# Patient Record
Sex: MALE | Race: WHITE | NOT HISPANIC OR LATINO | Employment: OTHER | URBAN - METROPOLITAN AREA
[De-identification: names, ages, dates, MRNs, and addresses within clinical notes are randomized per-mention and may not be internally consistent; named-entity substitution may affect disease eponyms.]

---

## 2019-09-09 ENCOUNTER — APPOINTMENT (EMERGENCY)
Dept: RADIOLOGY | Facility: HOSPITAL | Age: 64
DRG: 204 | End: 2019-09-09
Payer: COMMERCIAL

## 2019-09-09 ENCOUNTER — HOSPITAL ENCOUNTER (INPATIENT)
Facility: HOSPITAL | Age: 64
LOS: 1 days | Discharge: HOME | DRG: 204 | End: 2019-09-12
Attending: EMERGENCY MEDICINE | Admitting: INTERNAL MEDICINE
Payer: COMMERCIAL

## 2019-09-09 ENCOUNTER — APPOINTMENT (EMERGENCY)
Dept: CT IMAGING | Facility: HOSPITAL | Age: 64
DRG: 204 | End: 2019-09-09
Payer: COMMERCIAL

## 2019-09-09 DIAGNOSIS — R06.02 SHORTNESS OF BREATH: ICD-10-CM

## 2019-09-09 DIAGNOSIS — R07.81 RIB PAIN: ICD-10-CM

## 2019-09-09 DIAGNOSIS — R05.3 CHRONIC COUGH: ICD-10-CM

## 2019-09-09 DIAGNOSIS — R05.3 PERSISTENT COUGH FOR 3 WEEKS OR LONGER: Primary | ICD-10-CM

## 2019-09-09 DIAGNOSIS — I10 ESSENTIAL HYPERTENSION: ICD-10-CM

## 2019-09-09 PROBLEM — Z96.651 S/P TOTAL KNEE ARTHROPLASTY, RIGHT: Status: ACTIVE | Noted: 2019-04-05

## 2019-09-09 PROBLEM — M17.11 OSTEOARTHRITIS OF RIGHT KNEE: Status: ACTIVE | Noted: 2019-02-12

## 2019-09-09 LAB
ALBUMIN SERPL BCP-MCNC: 3.8 G/DL (ref 3.5–5)
ALP SERPL-CCNC: 90 U/L (ref 46–116)
ALT SERPL W P-5'-P-CCNC: 31 U/L (ref 12–78)
ANION GAP SERPL CALCULATED.3IONS-SCNC: 9 MMOL/L (ref 4–13)
AST SERPL W P-5'-P-CCNC: 25 U/L (ref 5–45)
BASOPHILS # BLD AUTO: 0.03 THOUSANDS/ΜL (ref 0–0.1)
BASOPHILS NFR BLD AUTO: 1 % (ref 0–1)
BILIRUB SERPL-MCNC: 0.9 MG/DL (ref 0.2–1)
BUN SERPL-MCNC: 19 MG/DL (ref 5–25)
CALCIUM SERPL-MCNC: 9.3 MG/DL (ref 8.3–10.1)
CHLORIDE SERPL-SCNC: 101 MMOL/L (ref 100–108)
CO2 SERPL-SCNC: 29 MMOL/L (ref 21–32)
CREAT SERPL-MCNC: 1.01 MG/DL (ref 0.6–1.3)
DEPRECATED D DIMER PPP: 1164 NG/ML (FEU)
EOSINOPHIL # BLD AUTO: 0.17 THOUSAND/ΜL (ref 0–0.61)
EOSINOPHIL NFR BLD AUTO: 3 % (ref 0–6)
ERYTHROCYTE [DISTWIDTH] IN BLOOD BY AUTOMATED COUNT: 13 % (ref 11.6–15.1)
GFR SERPL CREATININE-BSD FRML MDRD: 77 ML/MIN/1.73SQ M
GLUCOSE SERPL-MCNC: 100 MG/DL (ref 65–140)
HCT VFR BLD AUTO: 41.5 % (ref 36.5–49.3)
HGB BLD-MCNC: 13.7 G/DL (ref 12–17)
IMM GRANULOCYTES # BLD AUTO: 0.01 THOUSAND/UL (ref 0–0.2)
IMM GRANULOCYTES NFR BLD AUTO: 0 % (ref 0–2)
LYMPHOCYTES # BLD AUTO: 1.31 THOUSANDS/ΜL (ref 0.6–4.47)
LYMPHOCYTES NFR BLD AUTO: 23 % (ref 14–44)
MCH RBC QN AUTO: 31.1 PG (ref 26.8–34.3)
MCHC RBC AUTO-ENTMCNC: 33 G/DL (ref 31.4–37.4)
MCV RBC AUTO: 94 FL (ref 82–98)
MONOCYTES # BLD AUTO: 0.54 THOUSAND/ΜL (ref 0.17–1.22)
MONOCYTES NFR BLD AUTO: 9 % (ref 4–12)
NEUTROPHILS # BLD AUTO: 3.7 THOUSANDS/ΜL (ref 1.85–7.62)
NEUTS SEG NFR BLD AUTO: 64 % (ref 43–75)
NRBC BLD AUTO-RTO: 0 /100 WBCS
NT-PROBNP SERPL-MCNC: 340 PG/ML
PLATELET # BLD AUTO: 189 THOUSANDS/UL (ref 149–390)
PMV BLD AUTO: 8.5 FL (ref 8.9–12.7)
POTASSIUM SERPL-SCNC: 4.2 MMOL/L (ref 3.5–5.3)
PROT SERPL-MCNC: 7.3 G/DL (ref 6.4–8.2)
RBC # BLD AUTO: 4.41 MILLION/UL (ref 3.88–5.62)
SODIUM SERPL-SCNC: 139 MMOL/L (ref 136–145)
TROPONIN I SERPL-MCNC: <0.02 NG/ML
WBC # BLD AUTO: 5.76 THOUSAND/UL (ref 4.31–10.16)

## 2019-09-09 PROCEDURE — 84484 ASSAY OF TROPONIN QUANT: CPT | Performed by: PHYSICIAN ASSISTANT

## 2019-09-09 PROCEDURE — 71046 X-RAY EXAM CHEST 2 VIEWS: CPT

## 2019-09-09 PROCEDURE — 84443 ASSAY THYROID STIM HORMONE: CPT | Performed by: NURSE PRACTITIONER

## 2019-09-09 PROCEDURE — 85025 COMPLETE CBC W/AUTO DIFF WBC: CPT | Performed by: PHYSICIAN ASSISTANT

## 2019-09-09 PROCEDURE — 94645 CONT INHLJ TX EACH ADDL HOUR: CPT

## 2019-09-09 PROCEDURE — 94760 N-INVAS EAR/PLS OXIMETRY 1: CPT

## 2019-09-09 PROCEDURE — 94644 CONT INHLJ TX 1ST HOUR: CPT

## 2019-09-09 PROCEDURE — 71275 CT ANGIOGRAPHY CHEST: CPT

## 2019-09-09 PROCEDURE — 83880 ASSAY OF NATRIURETIC PEPTIDE: CPT | Performed by: PHYSICIAN ASSISTANT

## 2019-09-09 PROCEDURE — 94664 DEMO&/EVAL PT USE INHALER: CPT

## 2019-09-09 PROCEDURE — 96374 THER/PROPH/DIAG INJ IV PUSH: CPT

## 2019-09-09 PROCEDURE — 80053 COMPREHEN METABOLIC PANEL: CPT | Performed by: PHYSICIAN ASSISTANT

## 2019-09-09 PROCEDURE — 99220 PR INITIAL OBSERVATION CARE/DAY 70 MINUTES: CPT | Performed by: INTERNAL MEDICINE

## 2019-09-09 PROCEDURE — 85379 FIBRIN DEGRADATION QUANT: CPT | Performed by: PHYSICIAN ASSISTANT

## 2019-09-09 PROCEDURE — 99285 EMERGENCY DEPT VISIT HI MDM: CPT | Performed by: PHYSICIAN ASSISTANT

## 2019-09-09 PROCEDURE — 36415 COLL VENOUS BLD VENIPUNCTURE: CPT | Performed by: PHYSICIAN ASSISTANT

## 2019-09-09 PROCEDURE — 99291 CRITICAL CARE FIRST HOUR: CPT

## 2019-09-09 PROCEDURE — 93005 ELECTROCARDIOGRAM TRACING: CPT

## 2019-09-09 RX ORDER — AMLODIPINE BESYLATE 5 MG/1
5 TABLET ORAL DAILY
COMMUNITY
Start: 2019-08-15 | End: 2019-09-12 | Stop reason: HOSPADM

## 2019-09-09 RX ORDER — GUAIFENESIN 600 MG
600 TABLET, EXTENDED RELEASE 12 HR ORAL EVERY 12 HOURS SCHEDULED
Status: DISCONTINUED | OUTPATIENT
Start: 2019-09-09 | End: 2019-09-11

## 2019-09-09 RX ORDER — MAGNESIUM SULFATE HEPTAHYDRATE 40 MG/ML
2 INJECTION, SOLUTION INTRAVENOUS ONCE
Status: COMPLETED | OUTPATIENT
Start: 2019-09-09 | End: 2019-09-10

## 2019-09-09 RX ORDER — LIDOCAINE 50 MG/G
2 PATCH TOPICAL ONCE
Status: COMPLETED | OUTPATIENT
Start: 2019-09-09 | End: 2019-09-10

## 2019-09-09 RX ORDER — SODIUM CHLORIDE FOR INHALATION 0.9 %
12 VIAL, NEBULIZER (ML) INHALATION ONCE
Status: COMPLETED | OUTPATIENT
Start: 2019-09-09 | End: 2019-09-09

## 2019-09-09 RX ORDER — AMLODIPINE BESYLATE 5 MG/1
5 TABLET ORAL DAILY
Status: DISCONTINUED | OUTPATIENT
Start: 2019-09-10 | End: 2019-09-10

## 2019-09-09 RX ORDER — KETOROLAC TROMETHAMINE 30 MG/ML
15 INJECTION, SOLUTION INTRAMUSCULAR; INTRAVENOUS ONCE
Status: COMPLETED | OUTPATIENT
Start: 2019-09-09 | End: 2019-09-09

## 2019-09-09 RX ORDER — ACETAMINOPHEN 325 MG/1
650 TABLET ORAL EVERY 6 HOURS PRN
Status: DISCONTINUED | OUTPATIENT
Start: 2019-09-09 | End: 2019-09-12 | Stop reason: HOSPADM

## 2019-09-09 RX ORDER — METHYLPREDNISOLONE SODIUM SUCCINATE 125 MG/2ML
125 INJECTION, POWDER, LYOPHILIZED, FOR SOLUTION INTRAMUSCULAR; INTRAVENOUS ONCE
Status: COMPLETED | OUTPATIENT
Start: 2019-09-09 | End: 2019-09-09

## 2019-09-09 RX ORDER — SODIUM CHLORIDE FOR INHALATION 0.9 %
3 VIAL, NEBULIZER (ML) INHALATION ONCE
Status: COMPLETED | OUTPATIENT
Start: 2019-09-09 | End: 2019-09-09

## 2019-09-09 RX ORDER — IPRATROPIUM BROMIDE AND ALBUTEROL SULFATE 2.5; .5 MG/3ML; MG/3ML
3 SOLUTION RESPIRATORY (INHALATION) EVERY 6 HOURS PRN
Status: DISCONTINUED | OUTPATIENT
Start: 2019-09-09 | End: 2019-09-11

## 2019-09-09 RX ORDER — METHOCARBAMOL 500 MG/1
500 TABLET, FILM COATED ORAL EVERY 6 HOURS PRN
Status: DISCONTINUED | OUTPATIENT
Start: 2019-09-09 | End: 2019-09-12 | Stop reason: HOSPADM

## 2019-09-09 RX ORDER — ENALAPRIL MALEATE AND HYDROCHLOROTHIAZIDE 10; 25 MG/1; MG/1
1 TABLET ORAL DAILY
COMMUNITY
Start: 2019-08-28 | End: 2019-09-12 | Stop reason: HOSPADM

## 2019-09-09 RX ORDER — LISINOPRIL 20 MG/1
20 TABLET ORAL DAILY
Status: DISCONTINUED | OUTPATIENT
Start: 2019-09-10 | End: 2019-09-10

## 2019-09-09 RX ORDER — HYDROCHLOROTHIAZIDE 25 MG/1
25 TABLET ORAL DAILY
Status: DISCONTINUED | OUTPATIENT
Start: 2019-09-10 | End: 2019-09-12 | Stop reason: HOSPADM

## 2019-09-09 RX ORDER — IPRATROPIUM BROMIDE AND ALBUTEROL SULFATE 2.5; .5 MG/3ML; MG/3ML
3 SOLUTION RESPIRATORY (INHALATION)
Status: DISCONTINUED | OUTPATIENT
Start: 2019-09-10 | End: 2019-09-09

## 2019-09-09 RX ORDER — FUROSEMIDE 10 MG/ML
20 INJECTION INTRAMUSCULAR; INTRAVENOUS ONCE
Status: COMPLETED | OUTPATIENT
Start: 2019-09-09 | End: 2019-09-09

## 2019-09-09 RX ADMIN — ALBUTEROL SULFATE 10 MG: 2.5 SOLUTION RESPIRATORY (INHALATION) at 18:25

## 2019-09-09 RX ADMIN — ISODIUM CHLORIDE 12 ML: 0.03 SOLUTION RESPIRATORY (INHALATION) at 18:25

## 2019-09-09 RX ADMIN — KETOROLAC TROMETHAMINE 15 MG: 30 INJECTION, SOLUTION INTRAMUSCULAR at 18:10

## 2019-09-09 RX ADMIN — IPRATROPIUM BROMIDE 1 MG: 0.5 SOLUTION RESPIRATORY (INHALATION) at 18:25

## 2019-09-09 RX ADMIN — IPRATROPIUM BROMIDE 0.5 MG: 0.5 SOLUTION RESPIRATORY (INHALATION) at 16:03

## 2019-09-09 RX ADMIN — ACETAMINOPHEN 650 MG: 325 TABLET ORAL at 20:38

## 2019-09-09 RX ADMIN — FUROSEMIDE 20 MG: 10 INJECTION, SOLUTION INTRAMUSCULAR; INTRAVENOUS at 20:38

## 2019-09-09 RX ADMIN — METHYLPREDNISOLONE SODIUM SUCCINATE 125 MG: 125 INJECTION, POWDER, FOR SOLUTION INTRAMUSCULAR; INTRAVENOUS at 20:38

## 2019-09-09 RX ADMIN — LIDOCAINE 2 PATCH: 50 PATCH TOPICAL at 15:55

## 2019-09-09 RX ADMIN — IOHEXOL 100 ML: 350 INJECTION, SOLUTION INTRAVENOUS at 17:14

## 2019-09-09 RX ADMIN — ALBUTEROL SULFATE 10 MG: 2.5 SOLUTION RESPIRATORY (INHALATION) at 16:03

## 2019-09-09 RX ADMIN — MAGNESIUM SULFATE HEPTAHYDRATE 2 G: 40 INJECTION, SOLUTION INTRAVENOUS at 20:38

## 2019-09-09 RX ADMIN — ISODIUM CHLORIDE 3 ML: 0.03 SOLUTION RESPIRATORY (INHALATION) at 16:03

## 2019-09-09 RX ADMIN — METHOCARBAMOL TABLETS 500 MG: 500 TABLET, COATED ORAL at 20:46

## 2019-09-09 RX ADMIN — GUAIFENESIN 600 MG: 600 TABLET, EXTENDED RELEASE ORAL at 20:38

## 2019-09-09 NOTE — ED PROVIDER NOTES
History  Chief Complaint   Patient presents with    Cough     Pt reporst SOB and dry Cough for the last 1 month, pt reports seeing PCP for the 3rd time regarding these symptoms and was sent here  Pt reports being on antibitoics and chest XR with no findings  Pt reports having Right knee replacement april 1, and difficulty doing PT at this time due to SOB, pt reports also have diarrhea    Rib Pain     Pt reports left sided rib pain associated with coughing, reports "My lungs feel like their bruised:      Patient is a 76-year-old male presents today with a chief complaint of shortness of breath and cough the past 7 weeks  Patient reports he has rib pain associated with coughing and notes the cough is dry in nature  Patient denies an asthma history and denies COPD  Patient reports he has not had any fevers or chills associated with his cough  Patient reports he has taken multiple medications to attempt to alleviate the symptoms of his cough including steroids, albuterol, over-the-counter cough medications none of which have been helping alleviate his symptoms  History provided by:  Patient  Cough   Cough characteristics:  Non-productive  Severity:  Severe  Onset quality:  Gradual  Duration:  7 weeks  Timing:  Constant  Progression:  Worsening  Chronicity:  New  Smoker: no    Relieved by:  Nothing  Worsened by:  Deep breathing  Ineffective treatments:  Beta-agonist inhaler, decongestant, cough suppressants, rest and fluids  Associated symptoms: shortness of breath, sore throat and wheezing    Associated symptoms: no chest pain, no chills, no ear pain, no fever, no rash and no rhinorrhea        Prior to Admission Medications   Prescriptions Last Dose Informant Patient Reported? Taking?    amLODIPine (NORVASC) 5 mg tablet 9/9/2019 at Unknown time  Yes Yes   Sig: Take 5 mg by mouth daily   enalapril-hydrochlorothiazide (VASERETIC) 10-25 MG per tablet 9/9/2019 at Unknown time  Yes Yes   Sig: Take 1 tablet by mouth daily   prednisoLONE acetate (PRED FORTE) 1 % ophthalmic suspension Not Taking at Unknown time  No No   Sig: Administer 1 drop to both eyes 4 (four) times a day  Patient not taking: Reported on 9/9/2019      Facility-Administered Medications: None       Past Medical History:   Diagnosis Date    Hypertension        Past Surgical History:   Procedure Laterality Date    HERNIA REPAIR      ROTATOR CUFF REPAIR         History reviewed  No pertinent family history  I have reviewed and agree with the history as documented  Social History     Tobacco Use    Smoking status: Never Smoker   Substance Use Topics    Alcohol use: Yes     Comment: occasional     Drug use: No        Review of Systems   Constitutional: Negative for chills, fatigue and fever  HENT: Positive for sore throat  Negative for congestion, ear pain and rhinorrhea  Eyes: Negative for redness  Respiratory: Positive for cough, shortness of breath and wheezing  Negative for apnea, chest tightness and stridor  Cardiovascular: Negative for chest pain and palpitations  Gastrointestinal: Negative for abdominal pain, nausea and vomiting  Genitourinary: Negative for dysuria and hematuria  Musculoskeletal: Negative  Skin: Negative for rash  Neurological: Negative for dizziness, syncope, light-headedness and numbness  Physical Exam  Physical Exam   Constitutional: He is oriented to person, place, and time  He appears well-developed and well-nourished  HENT:   Head: Normocephalic and atraumatic  Eyes: Pupils are equal, round, and reactive to light  Neck: Normal range of motion  Cardiovascular: Normal rate, regular rhythm and normal heart sounds  Pulmonary/Chest: Tachypnea noted  He is in respiratory distress  He has decreased breath sounds  He has wheezes  He exhibits tenderness  Patient with 2-3 word dyspnea and coughing in between speaking with provider  Patient with wheezing dry cough no sputum production noted  Bilateral quiet lung sounds with wheezes noted to the bases during coughing and exhalation  Abdominal: Soft  There is no tenderness  There is no guarding  Lymphadenopathy:     He has no cervical adenopathy  Neurological: He is alert and oriented to person, place, and time  Skin: Skin is warm and dry  Capillary refill takes less than 2 seconds  Psychiatric: He has a normal mood and affect  Nursing note and vitals reviewed        Vital Signs  ED Triage Vitals [09/09/19 1511]   Temperature Pulse Respirations Blood Pressure SpO2   98 5 °F (36 9 °C) 78 22 149/82 99 %      Temp Source Heart Rate Source Patient Position - Orthostatic VS BP Location FiO2 (%)   Oral Monitor Sitting Left arm --      Pain Score       3           Vitals:    09/10/19 1500 09/10/19 2200 09/11/19 0733 09/11/19 0955   BP: 146/72 138/76 139/73    Pulse: 92 85 74 70   Patient Position - Orthostatic VS: Lying Lying Lying          Visual Acuity      ED Medications  Medications   guaiFENesin (MUCINEX) 12 hr tablet 600 mg (600 mg Oral Given 9/11/19 0821)   enoxaparin (LOVENOX) subcutaneous injection 40 mg (40 mg Subcutaneous Given 9/11/19 0821)   acetaminophen (TYLENOL) tablet 650 mg (650 mg Oral Given 9/10/19 2041)   methocarbamol (ROBAXIN) tablet 500 mg (500 mg Oral Given 9/11/19 0821)   ipratropium-albuterol (DUO-NEB) 0 5-2 5 mg/3 mL inhalation solution 3 mL (3 mL Nebulization Given 9/11/19 0931)   hydrochlorothiazide (HYDRODIURIL) tablet 25 mg (25 mg Oral Given 9/11/19 0821)   amLODIPine (NORVASC) tablet 10 mg (10 mg Oral Given 9/11/19 0821)   benzonatate (TESSALON PERLES) capsule 200 mg (200 mg Oral Given 9/11/19 0821)   dextromethorphan-guaiFENesin (ROBITUSSIN DM)  mg/5 mL oral syrup 10 mL (10 mL Oral Given 9/11/19 0821)   cefTRIAXone (ROCEPHIN) 2,000 mg in dextrose 5 % 50 mL IVPB (2,000 mg Intravenous New Bag 9/10/19 1816)   loratadine (CLARITIN) tablet 10 mg (has no administration in time range)   methylPREDNISolone sodium succinate (Solu-MEDROL) injection 20 mg (has no administration in time range)   levalbuterol (XOPENEX) inhalation solution 1 25 mg (has no administration in time range)   ipratropium (ATROVENT) 0 02 % inhalation solution 0 5 mg (has no administration in time range)   albuterol inhalation solution 10 mg (10 mg Nebulization Given 9/9/19 1603)     And   ipratropium (ATROVENT) 0 02 % inhalation solution 0 5 mg (0 5 mg Nebulization Given 9/9/19 1603)     And   sodium chloride 0 9 % inhalation solution 3 mL (3 mL Nebulization Given 9/9/19 1603)   lidocaine (LIDODERM) 5 % patch 2 patch (2 patches Topical Patch Removed 9/10/19 0438)   iohexol (OMNIPAQUE) 350 MG/ML injection (MULTI-DOSE) 100 mL (100 mL Intravenous Given 9/9/19 1714)   albuterol inhalation solution 10 mg (10 mg Nebulization Given 9/9/19 1825)   ipratropium (ATROVENT) 0 02 % inhalation solution 1 mg (1 mg Nebulization Given 9/9/19 1825)   sodium chloride 0 9 % inhalation solution 12 mL (12 mL Nebulization Given 9/9/19 1825)   ketorolac (TORADOL) injection 15 mg (15 mg Intravenous Given 9/9/19 1810)   methylPREDNISolone sodium succinate (Solu-MEDROL) injection 125 mg (125 mg Intravenous Given 9/9/19 2038)   furosemide (LASIX) injection 20 mg (20 mg Intravenous Given 9/9/19 2038)   magnesium sulfate 2 g/50 mL IVPB (premix) 2 g (0 g Intravenous Stopped 9/10/19 0723)   regadenoson (LEXISCAN) injection 0 4 mg (0 4 mg Intravenous Given 9/11/19 1154)       Diagnostic Studies  Results Reviewed     Procedure Component Value Units Date/Time    TSH, 3rd generation [[id number]]  (Normal) Collected:  09/09/19 1544    Lab Status:  Final result Specimen:  Blood from Arm, Right Updated:  09/10/19 1152     TSH 3RD GENERATON 3 477 uIU/mL     Narrative:       Patients undergoing fluorescein dye angiography may retain small amounts of fluorescein in the body for 48-72 hours post procedure  Samples containing fluorescein can produce falsely depressed TSH values   If the patient had this procedure,a specimen should be resubmitted post fluorescein clearance        D-Dimer [[id number]]  (Abnormal) Collected:  09/09/19 1544    Lab Status:  Final result Specimen:  Blood from Arm, Right Updated:  09/09/19 1628     D-Dimer, Quant 1,164 ng/ml (FEU)     B-type natriuretic peptide [[id number]]  (Abnormal) Collected:  09/09/19 1544    Lab Status:  Final result Specimen:  Blood from Arm, Right Updated:  09/09/19 1624     NT-proBNP 340 pg/mL     Comprehensive metabolic panel [[id number]] Collected:  09/09/19 1544    Lab Status:  Final result Specimen:  Blood from Arm, Right Updated:  09/09/19 1615     Sodium 139 mmol/L      Potassium 4 2 mmol/L      Chloride 101 mmol/L      CO2 29 mmol/L      ANION GAP 9 mmol/L      BUN 19 mg/dL      Creatinine 1 01 mg/dL      Glucose 100 mg/dL      Calcium 9 3 mg/dL      AST 25 U/L      ALT 31 U/L      Alkaline Phosphatase 90 U/L      Total Protein 7 3 g/dL      Albumin 3 8 g/dL      Total Bilirubin 0 90 mg/dL      eGFR 77 ml/min/1 73sq m     Narrative:       [facility] guidelines for Chronic Kidney Disease (CKD):     Stage 1 with normal or high GFR (GFR > 90 mL/min/1 73 square meters)    Stage 2 Mild CKD (GFR = 60-89 mL/min/1 73 square meters)    Stage 3A Moderate CKD (GFR = 45-59 mL/min/1 73 square meters)    Stage 3B Moderate CKD (GFR = 30-44 mL/min/1 73 square meters)    Stage 4 Severe CKD (GFR = 15-29 mL/min/1 73 square meters)    Stage 5 End Stage CKD (GFR <15 mL/min/1 73 square meters)  Note: GFR calculation is accurate only with a steady state creatinine    Troponin I [[id number]]  (Normal) Collected:  09/09/19 1544    Lab Status:  Final result Specimen:  Blood from Arm, Right Updated:  09/09/19 1611     Troponin I <0 02 ng/mL     CBC and differential [[id number]]  (Abnormal) Collected:  09/09/19 1544    Lab Status:  Final result Specimen:  Blood from Arm, Right Updated:  09/09/19 1553     WBC 5 76 Thousand/uL      RBC 4 41 Million/uL Hemoglobin 13 7 g/dL      Hematocrit 41 5 %      MCV 94 fL      MCH 31 1 pg      MCHC 33 0 g/dL      RDW 13 0 %      MPV 8 5 fL      Platelets 635 Thousands/uL      nRBC 0 /100 WBCs      Neutrophils Relative 64 %      Immat GRANS % 0 %      Lymphocytes Relative 23 %      Monocytes Relative 9 %      Eosinophils Relative 3 %      Basophils Relative 1 %      Neutrophils Absolute 3 70 Thousands/µL      Immature Grans Absolute 0 01 Thousand/uL      Lymphocytes Absolute 1 31 Thousands/µL      Monocytes Absolute 0 54 Thousand/µL      Eosinophils Absolute 0 17 Thousand/µL      Basophils Absolute 0 03 Thousands/µL                  XR chest pa & lateral   Final Result by Evan 6, DO (09/10 5677)      No acute cardiopulmonary disease  Workstation performed: [id number]         CTA ED chest PE Study   Final Result by Radha Campo MD (09/09 7847)      Mildly limited CTA of the chest demonstrates no gross evidence of an intraluminal filling defect to suggest a pulmonary embolus  No acute infiltrate or pleural effusion  Mild aneurysmal dilatation of the ascending thoracic aorta measuring 4 2 cm in diameter  Workstation performed: [id number]                    Procedures  ECG 12 Lead Documentation Only  Date/Time: 9/9/2019 3:59 PM  Performed by: Adelaide Hernandez PA-C  Authorized by: Adelaide Hernandez PA-C     Indications / Diagnosis:  Shortness of breath  ECG reviewed by me, the ED Provider: yes    Patient location:  ED  Previous ECG:     Previous ECG:  Unavailable    Comparison to cardiac monitor: No    Interpretation:     Interpretation: normal    Rate:     ECG rate:  70    ECG rate assessment: normal    Rhythm:     Rhythm: sinus rhythm    Ectopy:     Ectopy: none    QRS:     QRS axis:  Left    QRS intervals:   Wide (156)  Conduction:     Conduction: abnormal      Abnormal conduction: complete RBBB    ST segments:     ST segments:  Normal  T waves:     T waves: normal    Comments: qtc 457           ED Course  ED Course as of Sep 11 1306   Mon Sep 09, 2019   1629 D-DIMER QUANTITATIVE(!): 1,164   1807 Patient informed of CT scan results, patient requesting more albuterol noted to be dyspneic 2-3 words at most between coughing episodes  Will contact slim for admission  Patient was agreeable to admission                            Leopoldo' Criteria for PE      Most Recent Value   Wells' Criteria for PE   Clinical signs and symptoms of DVT  0 Filed at: 09/09/2019 1630   PE is primary diagnosis or equally likely  3 Filed at: 09/09/2019 1630   HR >100  0 Filed at: 09/09/2019 1630   Immobilization at least 3 days or Surgery in the previous 4 weeks  0 Filed at: 09/09/2019 1630   Previous, objectively diagnosed PE or DVT  0 Filed at: 09/09/2019 1630   Hemoptysis  0 Filed at: 09/09/2019 1630   Malignancy with treatment within 6 months or palliative  0 Filed at: 09/09/2019 1630   Wells' Criteria Total  3 Filed at: 09/09/2019 1630            MDM    Disposition  Final diagnoses:   Persistent cough for 3 weeks or longer   Rib pain     Time reflects when diagnosis was documented in both MDM as applicable and the Disposition within this note     Time User Action Codes Description Comment    9/9/2019  6:15 PM Naima Cruz Add [R05] Persistent cough for 3 weeks or longer     9/9/2019  6:15 PM Aurora Avendaño Jeniffer [R07 81] Rib pain     9/10/2019  9:24 AM Ish HURTADO Add [R06 02] Shortness of breath     9/10/2019  9:24 AM Ish HURTADO Remove [R06 02] Shortness of breath     9/10/2019  9:24 AM Lolita Chung Add [R06 02] Shortness of breath       ED Disposition     ED Disposition Condition Date/Time Comment    Admit Stable Mon Sep 9, 2019  6:15 PM Case was discussed with Dr Serrano and the patient's admission status was agreed to be Admission Status: observation status to the service of Dr Tiffany Dooley           Follow-up Information     Follow up With Specialties Details Why Contact Info    Lb Mendez PA-C Pulmonary Disease, Pulmonology, Physician Assistant, Critical Care Medicine Follow up on 9/17/2019 Your appointment is at 10:30 am  Please arrive 15 minutes early to complete the check-in process  [address]            Current Discharge Medication List      CONTINUE these medications which have NOT CHANGED    Details   amLODIPine (NORVASC) 5 mg tablet Take 5 mg by mouth daily      enalapril-hydrochlorothiazide (VASERETIC) 10-25 MG per tablet Take 1 tablet by mouth daily      prednisoLONE acetate (PRED FORTE) 1 % ophthalmic suspension Administer 1 drop to both eyes 4 (four) times a day  Qty: 5 mL, Refills: 0           No discharge procedures on file      ED Provider  Electronically Signed by           Isra Young PA-C  09/11/19 1135

## 2019-09-09 NOTE — ED NOTES
Respiratory called for 10 mg albuterol radhika Casillas, [address] [facility]  09/09/19 2688

## 2019-09-09 NOTE — ED NOTES
Patient states he feels "much better" with the albuterol 10 mgbreathing treatment        Alexsandra Casillas RN  09/09/19 9862

## 2019-09-09 NOTE — ASSESSMENT & PLAN NOTE
· Unclear etiology  Consider URI/bronchitis vs cardiac  Patient was apparently diagnosed with bronchitis on 8/16/19  He had follow-up with his PCP on 8/21/19 for Bronchitis, though was not getting better on z-pack, albuterol inhaler, and course of steroids  No prior hx of underlying lung disease  No smoking hx  Patient does not have history of CHF though concerning for possible cardiac component given chronicity and poor response to above interventions since beginning of August   He does not appear infected and I do not appreciate significant wheezes on exam   Possible +crackles and 1+ edema bilat  CXR is relatively unremarkable though may show mild vascular congestion on my read  Do not appreciate any infiltrate suggestive of pneumonia and he has been afebrile with leukocytosis since these symptoms began over a month ago  Still awaiting official radiology read  D-dimer elevated to 1,164 though CTA negative so do not suspect acute PE at this time  Pro-BNP mildly elevated at 304  · He received STAPLETON neb x2 with multiple duonebs down stairs with minimal improvement  He has significant conversational dyspnea  · Patient is not hypoxic, no indication for supplemental oxygen at this time  Continue to monitor  · Will give x1 dose 20mg IV lasix to assess response  Patient is lasix naive  · Will continue duonebs q6 hours  Respiratory protocol  Incentive spirometry  · x1 dose solumedrol and Mg now  · Check 2D echocardiogram to evaluate LV/diastolic dysfunction  · Monitor clinically    Monitor fever curve and WBC ct

## 2019-09-09 NOTE — H&P
H&P- Marley Grow 1955, 75 y o  male MRN: [id number]    Unit/Bed#: -Jerry Encounter: [id number]    Primary Care Provider: Zeb Chicas DO   Date and time admitted to hospital: 9/9/2019  3:29 PM    * Shortness of breath  Assessment & Plan  · Unclear etiology  Consider URI/bronchitis vs cardiac  Patient was apparently diagnosed with bronchitis on 8/16/19  He had follow-up with his PCP on 8/21/19 for Bronchitis, though was not getting better on z-pack, albuterol inhaler, and course of steroids  No prior hx of underlying lung disease  No smoking hx  Patient does not have history of CHF though concerning for possible cardiac component given chronicity and poor response to above interventions since beginning of August   He does not appear infected and I do not appreciate significant wheezes on exam   Possible +crackles and 1+ edema bilat  CXR is relatively unremarkable though may show mild vascular congestion on my read  Do not appreciate any infiltrate suggestive of pneumonia and he has been afebrile with leukocytosis since these symptoms began over a month ago  Still awaiting official radiology read  D-dimer elevated to 1,164 though CTA negative so do not suspect acute PE at this time  Pro-BNP mildly elevated at 304  · He received STAPLETON neb x2 with multiple duonebs down stairs with minimal improvement  He has significant conversational dyspnea  · Patient is not hypoxic, no indication for supplemental oxygen at this time  Continue to monitor  · Will give x1 dose 20mg IV lasix to assess response  Patient is lasix naive  · Will continue duonebs q6 hours  Respiratory protocol  Incentive spirometry  · x1 dose solumedrol and Mg now  · Check 2D echocardiogram to evaluate LV/diastolic dysfunction  · Monitor clinically    Monitor fever curve and WBC ct    Essential hypertension  Assessment & Plan  · Restart home meds    VTE Prophylaxis: Enoxaparin (Lovenox)  / sequential compression device   Code Status: Level 1 Full code    Anticipated Length of Stay:  Patient will be admitted on an Observation basis with an anticipated length of stay of  < 2 midnights  Justification for Hospital Stay: IV steroids, IV lasix, echo, scheduled breathing treatments    Total Time for Visit, including Counseling / Coordination of Care: 45 minutes  Greater than 50% of this total time spent on direct patient counseling and coordination of care  Chief Complaint:   Shortness of breath    History of Present Illness:    Aylin Rosas is a 75 y o  male with a past medical history significant for HTN who presents with chief complaint of shortness of breath and worsening dry cough for the past month  Majority of the history was obtained from chart review as patient having significant dyspnea with talking  Per chart review, patient has been having shortness of breath and dry cough since early August   He saw seen in urgent care and saw his PCP at [address] [place] [address] 3 times since then  He was initially diagnosed with bronchitis and was started on azithromycin, course of prednisone and albuterol inhaler  If no response he was recently seen back in his PCP office on 08/21 due to non improving symptoms  Returns today with these complaints as they have not gotten better  Denies any fevers or chills  He does state he has a dry cough  Denies any known underlying lung disease  Denies any underlying cardiac disease  He denies any prior history of nicotine abuse  Denies any recent sick contacts  Denies any prior occurrences  Has been afebrile without leukocytosis  Denies any fevers or chills  Denies any orthopnea though does state his legs swell mildly from time to time    Review of Systems:    Review of Systems   Constitutional: Negative for chills and fatigue  HENT: Negative for congestion and rhinorrhea  Eyes: Negative for visual disturbance  Respiratory: Positive for cough and shortness of breath   Negative for apnea, choking, chest tightness and wheezing  Cardiovascular: Positive for leg swelling  Negative for chest pain  Gastrointestinal: Negative for abdominal distention, blood in stool, constipation, diarrhea, nausea and vomiting  Musculoskeletal: Negative for arthralgias  Skin: Negative for color change, pallor and rash  Neurological: Negative for dizziness, tremors, seizures, facial asymmetry, weakness, light-headedness and headaches  All other systems reviewed and are negative  Past Medical and Surgical History:     Past Medical History:   Diagnosis Date    Hypertension        Past Surgical History:   Procedure Laterality Date    HERNIA REPAIR      ROTATOR CUFF REPAIR         Meds/Allergies:    Prior to Admission medications    Medication Sig Start Date End Date Taking? Authorizing Provider   amLODIPine (NORVASC) 5 mg tablet Take 5 mg by mouth daily 8/15/19 8/14/20 Yes Historical Provider, MD   enalapril-hydrochlorothiazide (VASERETIC) 10-25 MG per tablet Take 1 tablet by mouth daily 8/28/19 8/27/20 Yes Historical Provider, MD   prednisoLONE acetate (PRED FORTE) 1 % ophthalmic suspension Administer 1 drop to both eyes 4 (four) times a day  Patient not taking: Reported on 9/9/2019 8/4/16   Ana Sweeney MD     I have reviewed home medications with patient personally  Allergies: No Known Allergies    Social History:     Marital Status: Single     Substance Use History:   Social History     Substance and Sexual Activity   Alcohol Use Yes    Comment: occasional      Social History     Tobacco Use   Smoking Status Never Smoker     Social History     Substance and Sexual Activity   Drug Use No       Family History:    History reviewed  No pertinent family history      Physical Exam:     Vitals:   Blood Pressure: 145/65 (09/09/19 1946)  Pulse: 84 (09/09/19 1946)  Temperature: 98 7 °F (37 1 °C) (09/09/19 1946)  Temp Source: Oral (09/09/19 1946)  Respirations: 20 (09/09/19 1946)  Height: 5' 9" (175 3 cm) (09/09/19 1946)  Weight - Scale: 115 kg (252 lb 10 4 oz) (09/09/19 1946)  SpO2: 99 % (09/09/19 1946)    Physical Exam   Constitutional: He is oriented to person, place, and time  He appears well-developed and well-nourished  He appears distressed  HENT:   Head: Normocephalic and atraumatic  Eyes: Pupils are equal, round, and reactive to light  Neck: Normal range of motion  No JVD present  Cardiovascular: Normal rate and regular rhythm  Exam reveals no friction rub  No murmur heard  Pulmonary/Chest: No stridor  He is in respiratory distress  He has wheezes  He has no rales  Tachypneic  Mild expiratory wheezing with crackles at bases  Significant conversation dyspnea with dry cough   Abdominal: Soft  Bowel sounds are normal  He exhibits no distension  There is no tenderness  Musculoskeletal: He exhibits edema (1+ bilateral LE edema)  Neurological: He is alert and oriented to person, place, and time  Skin: Skin is warm  He is diaphoretic  No erythema  Nursing note and vitals reviewed  Additional Data:     Lab Results: I have personally reviewed pertinent reports  Results from last 7 days   Lab Units 09/09/19  1544   WBC Thousand/uL 5 76   HEMOGLOBIN g/dL 13 7   HEMATOCRIT % 41 5   PLATELETS Thousands/uL 189   NEUTROS PCT % 64   LYMPHS PCT % 23   MONOS PCT % 9   EOS PCT % 3     Results from last 7 days   Lab Units 09/09/19  1544   POTASSIUM mmol/L 4 2   CHLORIDE mmol/L 101   CO2 mmol/L 29   BUN mg/dL 19   CREATININE mg/dL 1 01   CALCIUM mg/dL 9 3   ALK PHOS U/L 90   ALT U/L 31   AST U/L 25           Imaging: I have personally reviewed pertinent reports  Cta Ed Chest Pe Study    Result Date: 9/9/2019  Narrative: CTA - CHEST WITH IV CONTRAST - PULMONARY ANGIOGRAM INDICATION:   Dyspnea chronic, prior xray Shortness of breath  COMPARISON: None   TECHNIQUE: CTA examination of the chest was performed using angiographic technique according to a protocol specifically tailored to evaluate for pulmonary embolism  Axial, sagittal, and coronal 2D reformatted images were created from the source data and  submitted for interpretation  In addition, coronal 3D MIP postprocessing was performed on the acquisition scanner  Radiation dose length product (DLP) for this visit:  913 mGy-cm   This examination, like all CT scans performed in the [facility], was performed utilizing techniques to minimize radiation dose exposure, including the use of iterative reconstruction and automated exposure control  IV Contrast:  100 mL of iohexol (OMNIPAQUE)  FINDINGS: PULMONARY ARTERIAL TREE:  The study was limited by the timing of the contrast bolus and patient's body habitus  Although there is no gross evidence of an intraluminal filling defect within a proximal pulmonary artery branch, evaluation for distal subsegmental emboli is limited  LUNGS:  There is mild bibasilar atelectasis with no focal infiltrate or pleural effusion  No pneumothorax  There is no tracheal or endobronchial lesion  PLEURA:  Unremarkable  HEART/GREAT VESSELS:  The heart is not enlarged and is no pericardial effusion  There is mild aneurysmal dilatation of the ascending thoracic aorta measuring 4 2 cm in diameter  MEDIASTINUM AND ARI:  Unremarkable  CHEST WALL AND LOWER NECK:   Unremarkable  VISUALIZED STRUCTURES IN THE UPPER ABDOMEN:  There is a small sliding hiatal hernia  OSSEOUS STRUCTURES:  No acute fracture or destructive osseous lesion  Impression: Mildly limited CTA of the chest demonstrates no gross evidence of an intraluminal filling defect to suggest a pulmonary embolus  No acute infiltrate or pleural effusion  Mild aneurysmal dilatation of the ascending thoracic aorta measuring 4 2 cm in diameter  Workstation performed: [id number]     Allscripts / Epic Records Reviewed: Yes     ** Please Note: This note has been constructed using a voice recognition system   **

## 2019-09-10 ENCOUNTER — APPOINTMENT (OUTPATIENT)
Dept: NON INVASIVE DIAGNOSTICS | Facility: HOSPITAL | Age: 64
DRG: 204 | End: 2019-09-10
Payer: COMMERCIAL

## 2019-09-10 PROBLEM — J20.9 ACUTE TRACHEOBRONCHITIS: Status: ACTIVE | Noted: 2019-09-10

## 2019-09-10 PROBLEM — R05.3 CHRONIC COUGH: Status: ACTIVE | Noted: 2019-09-10

## 2019-09-10 LAB
ATRIAL RATE: 70 BPM
BACTERIA SPT RESP CULT: ABNORMAL
GRAM STN SPEC: ABNORMAL
P AXIS: 60 DEGREES
PLATELET # BLD AUTO: 205 THOUSANDS/UL (ref 149–390)
PMV BLD AUTO: 8.7 FL (ref 8.9–12.7)
PR INTERVAL: 118 MS
QRS AXIS: -55 DEGREES
QRSD INTERVAL: 156 MS
QT INTERVAL: 424 MS
QTC INTERVAL: 457 MS
T WAVE AXIS: 69 DEGREES
TROPONIN I SERPL-MCNC: <0.02 NG/ML
TSH SERPL DL<=0.05 MIU/L-ACNC: 3.48 UIU/ML (ref 0.36–3.74)
VENTRICULAR RATE: 70 BPM

## 2019-09-10 PROCEDURE — 87205 SMEAR GRAM STAIN: CPT | Performed by: NURSE PRACTITIONER

## 2019-09-10 PROCEDURE — NC001 PR NO CHARGE: Performed by: NURSE PRACTITIONER

## 2019-09-10 PROCEDURE — 94760 N-INVAS EAR/PLS OXIMETRY 1: CPT

## 2019-09-10 PROCEDURE — 99225 PR SBSQ OBSERVATION CARE/DAY 25 MINUTES: CPT | Performed by: INTERNAL MEDICINE

## 2019-09-10 PROCEDURE — 85049 AUTOMATED PLATELET COUNT: CPT | Performed by: INTERNAL MEDICINE

## 2019-09-10 PROCEDURE — 94640 AIRWAY INHALATION TREATMENT: CPT

## 2019-09-10 PROCEDURE — 87521 HEPATITIS C PROBE&RVRS TRNSC: CPT | Performed by: PHYSICIAN ASSISTANT

## 2019-09-10 PROCEDURE — 93010 ELECTROCARDIOGRAM REPORT: CPT | Performed by: INTERNAL MEDICINE

## 2019-09-10 PROCEDURE — 99244 OFF/OP CNSLTJ NEW/EST MOD 40: CPT | Performed by: INTERNAL MEDICINE

## 2019-09-10 PROCEDURE — 93306 TTE W/DOPPLER COMPLETE: CPT | Performed by: INTERNAL MEDICINE

## 2019-09-10 PROCEDURE — 84484 ASSAY OF TROPONIN QUANT: CPT | Performed by: NURSE PRACTITIONER

## 2019-09-10 PROCEDURE — 93306 TTE W/DOPPLER COMPLETE: CPT

## 2019-09-10 PROCEDURE — 94150 VITAL CAPACITY TEST: CPT

## 2019-09-10 RX ORDER — GUAIFENESIN/DEXTROMETHORPHAN 100-10MG/5
10 SYRUP ORAL EVERY 4 HOURS PRN
Status: DISCONTINUED | OUTPATIENT
Start: 2019-09-10 | End: 2019-09-12 | Stop reason: HOSPADM

## 2019-09-10 RX ORDER — METHYLPREDNISOLONE SODIUM SUCCINATE 40 MG/ML
40 INJECTION, POWDER, LYOPHILIZED, FOR SOLUTION INTRAMUSCULAR; INTRAVENOUS EVERY 12 HOURS SCHEDULED
Status: DISCONTINUED | OUTPATIENT
Start: 2019-09-10 | End: 2019-09-11

## 2019-09-10 RX ORDER — BENZONATATE 100 MG/1
200 CAPSULE ORAL 3 TIMES DAILY
Status: DISCONTINUED | OUTPATIENT
Start: 2019-09-10 | End: 2019-09-12 | Stop reason: HOSPADM

## 2019-09-10 RX ORDER — AMLODIPINE BESYLATE 10 MG/1
10 TABLET ORAL DAILY
Status: DISCONTINUED | OUTPATIENT
Start: 2019-09-11 | End: 2019-09-12 | Stop reason: HOSPADM

## 2019-09-10 RX ORDER — GUAIFENESIN 600 MG
600 TABLET, EXTENDED RELEASE 12 HR ORAL EVERY 12 HOURS SCHEDULED
Status: DISCONTINUED | OUTPATIENT
Start: 2019-09-10 | End: 2019-09-10

## 2019-09-10 RX ORDER — IPRATROPIUM BROMIDE AND ALBUTEROL SULFATE 2.5; .5 MG/3ML; MG/3ML
3 SOLUTION RESPIRATORY (INHALATION)
Status: DISCONTINUED | OUTPATIENT
Start: 2019-09-10 | End: 2019-09-10

## 2019-09-10 RX ADMIN — ACETAMINOPHEN 650 MG: 325 TABLET ORAL at 06:04

## 2019-09-10 RX ADMIN — AMLODIPINE BESYLATE 5 MG: 5 TABLET ORAL at 08:36

## 2019-09-10 RX ADMIN — ACETAMINOPHEN 650 MG: 325 TABLET ORAL at 20:41

## 2019-09-10 RX ADMIN — BENZONATATE 200 MG: 100 CAPSULE ORAL at 20:36

## 2019-09-10 RX ADMIN — METHYLPREDNISOLONE SODIUM SUCCINATE 40 MG: 40 INJECTION, POWDER, FOR SOLUTION INTRAMUSCULAR; INTRAVENOUS at 17:13

## 2019-09-10 RX ADMIN — GUAIFENESIN AND DEXTROMETHORPHAN 10 ML: 100; 10 SYRUP ORAL at 10:05

## 2019-09-10 RX ADMIN — METHOCARBAMOL TABLETS 500 MG: 500 TABLET, COATED ORAL at 20:41

## 2019-09-10 RX ADMIN — ACETAMINOPHEN 650 MG: 325 TABLET ORAL at 14:43

## 2019-09-10 RX ADMIN — BENZONATATE 200 MG: 100 CAPSULE ORAL at 17:13

## 2019-09-10 RX ADMIN — METHOCARBAMOL TABLETS 500 MG: 500 TABLET, COATED ORAL at 06:04

## 2019-09-10 RX ADMIN — LISINOPRIL 20 MG: 20 TABLET ORAL at 08:36

## 2019-09-10 RX ADMIN — GUAIFENESIN 600 MG: 600 TABLET, EXTENDED RELEASE ORAL at 20:37

## 2019-09-10 RX ADMIN — CEFTRIAXONE SODIUM 2000 MG: 10 INJECTION, POWDER, FOR SOLUTION INTRAVENOUS at 18:16

## 2019-09-10 RX ADMIN — IPRATROPIUM BROMIDE AND ALBUTEROL SULFATE 3 ML: 2.5; .5 SOLUTION RESPIRATORY (INHALATION) at 17:19

## 2019-09-10 RX ADMIN — IPRATROPIUM BROMIDE AND ALBUTEROL SULFATE 3 ML: 2.5; .5 SOLUTION RESPIRATORY (INHALATION) at 07:38

## 2019-09-10 RX ADMIN — GUAIFENESIN 600 MG: 600 TABLET, EXTENDED RELEASE ORAL at 08:35

## 2019-09-10 RX ADMIN — METHOCARBAMOL TABLETS 500 MG: 500 TABLET, COATED ORAL at 14:43

## 2019-09-10 RX ADMIN — ENOXAPARIN SODIUM 40 MG: 40 INJECTION SUBCUTANEOUS at 08:35

## 2019-09-10 RX ADMIN — HYDROCHLOROTHIAZIDE 25 MG: 25 TABLET ORAL at 08:36

## 2019-09-10 RX ADMIN — BENZONATATE 200 MG: 100 CAPSULE ORAL at 10:05

## 2019-09-10 NOTE — PLAN OF CARE
Problem: RESPIRATORY - ADULT  Goal: Achieves optimal ventilation and oxygenation  Description  INTERVENTIONS:  - Assess for changes in respiratory status  - Assess for changes in mentation and behavior  - Position to facilitate oxygenation and minimize respiratory effort  - Oxygen administered by appropriate delivery if ordered  - Initiate smoking cessation education as indicated  - Encourage broncho-pulmonary hygiene including cough, deep breathe, Incentive Spirometry  - Assess the need for suctioning and aspirate as needed  - Assess and instruct to report SOB or any respiratory difficulty  - Respiratory Therapy support as indicated  Outcome: Progressing     Problem: PAIN - ADULT  Goal: Verbalizes/displays adequate comfort level or baseline comfort level  Description  Interventions:  - Encourage patient to monitor pain and request assistance  - Assess pain using appropriate pain scale  - Administer analgesics based on type and severity of pain and evaluate response  - Implement non-pharmacological measures as appropriate and evaluate response  - Consider cultural and social influences on pain and pain management  - Notify physician/advanced practitioner if interventions unsuccessful or patient reports new pain  Outcome: Progressing     Problem: DISCHARGE PLANNING  Goal: Discharge to home or other facility with appropriate resources  Description  INTERVENTIONS:  - Identify barriers to discharge w/patient and caregiver  - Arrange for needed discharge resources and transportation as appropriate  - Identify discharge learning needs (meds, wound care, etc )  - Arrange for interpretive services to assist at discharge as needed  - Refer to Case Management Department for coordinating discharge planning if the patient needs post-hospital services based on physician/advanced practitioner order or complex needs related to functional status, cognitive ability, or social support system  Outcome: Progressing     Problem: Knowledge Deficit  Goal: Patient/family/caregiver demonstrates understanding of disease process, treatment plan, medications, and discharge instructions  Description  Complete learning assessment and assess knowledge base    Interventions:  - Provide teaching at level of understanding  - Provide teaching via preferred learning methods  Outcome: Progressing

## 2019-09-10 NOTE — ASSESSMENT & PLAN NOTE
Suspected based on increased cough, sputum production, and diffuse wheezing   · Start Rocephin 2gm IV daily, Solumedrol 40 mg IV BID, Duonebs QID   · Check sputum culture, procalcitonin, over night pulse oximetry  · Pulmonary consult

## 2019-09-10 NOTE — ASSESSMENT & PLAN NOTE
· Unclear etiology  Consider URI/tracheobronchitis vs cardiac  · Patient was apparently diagnosed with bronchitis on 8/16/19  He had follow-up with his PCP on 8/21/19 for Bronchitis, though was not getting better on z-pack, albuterol inhaler, and course of steroids  No prior hx of underlying lung disease  No smoking hx  Patient does not have history of CHF though concerning for possible cardiac component given chronicity and poor response to above interventions since beginning of August    · CXR is unremarkable  · D-dimer elevated to 1,164, though, CTA negative so do not suspect acute PE at this time  · Pro-BNP mildly elevated at 304  · ECHO: LVEF 65%, wall thickness increased, concentric hypertrophy present, aortic valve with trace to mild regurgitation, ascending aorta was dilated up to 4 4 cm      · Given Lasix 20 mg IV x1, Solumedrol 125 mg IV x1, and Alubterol nebulizer on admission   · Symptoms improved but returned the next afternoon   · Not hypoxic but intermittent coughing spells with diffuse wheezing     · Discontinue Lisinopril  · Start Rocephin 2gm IV daily and send sputum for culture (pt coughing up thick, yellow/green mucous)   · Check procalcitonin in am   · Start solumedrol 40 mg IV BID, Dounebs QID  · Check overnight oximetry, peak flows   · Obtain nuclear stress test in am  · Consult pulmonary, appreciate input   · Consider cards consult based on progress   · Monitor fever curve and WBC ct

## 2019-09-10 NOTE — ASSESSMENT & PLAN NOTE
Cough greater than 3 months   Started Lisinopril in April 2019  · Will discontinue Lisinopril and increase Norvasc   · Mucinex BID, Tessalon Perles TID

## 2019-09-10 NOTE — CONSULTS
Consult- Zain Prabha Rodriguez 1955, 75 y o  male MRN: [id number]    Unit/Bed#: -01 Encounter: [id number]    Primary Care Provider: Lucretia Da Silva DO   Date and time admitted to hospital: 9/9/2019  3:29 PM      Consults    * Shortness of breath  Assessment & Plan  · POA SOB and cough  · He had just gotten back to his room from walking around the floor and appeared to be comfortable by slightly SOB  Was coughing throughout encounter but stated that he was feeling better  · Denies postnasal drip   · O2 saturation is adequate on room air   · SOB likely secondary to HF  · Elevated proBNP  · Mild pulmonary vascular congestion on CXR  · No central or peripheral cyanosis  · Lungs clear to auscultation   · Plan  · Lasix for diuresis, treatment as per primary team  · Discontinue ACEI (likely cause of cough)    Essential hypertension  Assessment & Plan  Treatment as per primary team   Consider discontinuing ACEI because likely causing his cough                  Hx and PE limited by: Nothing  Chief Complaint: Cough and SOB  HPI: Doron Albert is a 75 y o   male with PMH of HTN  presented to [address] [place] [address] with complaints of cough and SOB  The cough began in "early August" and has been very bothersome since then  Nothing seems to aggravate or alleviate his cough  His SOB is pronounced when lying down flat and states that he would at times have to wake up in the middle of the night due to SOB  He would ultimately have to take a hot shower in an attempt to try and alleviate his SOB because "I was hoping it would loosen up the mucus " He was prescribed both of his BP meds (amlodipine, enalapril/HCTZ), in March prior to his surgery  Today, when I arrived in his room, he was just returning from his walk around the floor  He was slightly SOB, but appeared to be  comfortable  He says he is feeling better, though his cough is not resolved  When asked to clarify what is better, he says "my SOB is much better  Yesterday, I could barely finish a sentence " His only concern today is that he would like to know what is causing his nagging cough  He does not want to leave until his cough is controlled and he knows whats causing it  He has no other complaints  Review of Systems   Constitutional: Positive for activity change (would get SOB while riding his bicycle only "a short distance")  Negative for fatigue, fever and unexpected weight change  HENT: Negative for congestion and rhinorrhea  Respiratory: Positive for cough and shortness of breath (though it is improved)  Negative for chest tightness and wheezing  Cardiovascular: Negative for chest pain and palpitations  Gastrointestinal: Negative for abdominal pain, constipation and diarrhea  Neurological: Negative for weakness, light-headedness and headaches  All other systems reviewed and are negative  Historical Information   Past Medical History:   Diagnosis Date    Hypertension      Past Surgical History:   Procedure Laterality Date    HERNIA REPAIR      ROTATOR CUFF REPAIR       Social History   Social History     Substance and Sexual Activity   Alcohol Use Yes    Comment: occasional      Social History     Substance and Sexual Activity   Drug Use No     Social History     Tobacco Use   Smoking Status Never Smoker     Occupational History: unknown    Family History: History reviewed  No pertinent family history  Meds/Allergies   all current active meds have been reviewed    No Known Allergies    Objective   Vitals: Blood pressure 137/90, pulse 90, temperature 98 7 °F (37 1 °C), resp  rate 20, height 5' 9" (1 753 m), weight 115 kg (252 lb 10 4 oz), SpO2 97 %  Room air,Body mass index is 37 31 kg/m²    No intake or output data in the 24 hours ending 09/10/19 1509  Invasive Devices     Peripheral Intravenous Line            Peripheral IV 09/09/19 Right Antecubital less than 1 day                Physical Exam   Constitutional: He is oriented to person, place, and time  He appears well-developed and well-nourished  No distress  HENT:   Head: Normocephalic and atraumatic  Nose: Nose normal    Eyes: Conjunctivae are normal    Neck: No JVD present  Cardiovascular: Normal rate, regular rhythm, normal heart sounds and intact distal pulses  Exam reveals no gallop and no friction rub  No murmur heard  Pulmonary/Chest: Effort normal and breath sounds normal  No stridor  No respiratory distress  He has no wheezes  He has no rales  He exhibits no tenderness  Abdominal: Soft  Bowel sounds are normal  He exhibits no distension  There is no tenderness  There is no guarding  Musculoskeletal: He exhibits edema (1+ pitting edema bilaterally)  He exhibits no tenderness  Neurological: He is alert and oriented to person, place, and time  Skin: Skin is warm and dry  Capillary refill takes less than 2 seconds  He is not diaphoretic  Psychiatric: He has a normal mood and affect  Nursing note and vitals reviewed  Lab Results: I have personally reviewed pertinent lab results  Imaging Studies: I have personally reviewed pertinent reports  EKG, Pathology, and Other Studies: I have personally reviewed pertinent reports  Pulmonary Results (PFTs, PSG): N/A     VTE Prophylaxis: Enoxaparin (Lovenox)    Code Status: Level 1 - Full Code    Counseling/Coordination of Care: Total floor / unit time spent today 45 minutes  Greater than 50% of total time was spent with the patient and / or family counseling and / or coordination of care  A description of the counseling / coordination of care: Cough and SOB likely due to fluid overload as well as ACEI as contributing factor to cough    Portions of the record may have been created with voice recognition software  Occasional wrong word or "sound a like" substitutions may have occurred due to the inherent limitations of voice recognition software    Read the chart carefully and recognize, using context, where substitutions have occurred

## 2019-09-10 NOTE — UTILIZATION REVIEW
Initial Clinical Review    Admission: Date/Time/Statement:  Observation 9/9/19 @1815  Orders Placed This Encounter   Procedures    Place in Observation (expected length of stay for this patient is less than two midnights)     Standing Status:   Standing     Number of Occurrences:   1     Order Specific Question:   Admitting Physician     Answer:   Ty Ocampo [[id number]]     Order Specific Question:   Level of Care     Answer:   Med Surg [[id number]]     ED Arrival Information     Expected Arrival Acuity Means of Arrival Escorted By Service Admission Type    - 9/9/2019 14:35 Urgent Walk-In Self Hospitalist Urgent    Arrival Complaint    Difficulty Breathing/Cough        Chief Complaint   Patient presents with    Cough     Pt reporst SOB and dry Cough for the last 1 month, pt reports seeing PCP for the 3rd time regarding these symptoms and was sent here  Pt reports being on antibitoics and chest XR with no findings  Pt reports having Right knee replacement april 1, and difficulty doing PT at this time due to SOB, pt reports also have diarrhea    Rib Pain     Pt reports left sided rib pain associated with coughing, reports "My lungs feel like their bruised:      Assessment/Plan: 75year old male to the ED from home with complaints of cough, shortness of breath for 1 month prior to his arrival   Admitted under observation for shortness of breath  Diagnosed and treated of bronchitis on 8/16  Had follow up appt  8/21 at which time he was prescribed zpack, albuterol, steroids  Poor response to treatment and he continues with cough and shortness of breath  Afebrile with leukocytosis  Crackles heard on lung sounds with expiratory wheezing  1+ bilateral edema  Had multiple duonebs and 2 STAPLETON nebs in ED with minimal improvement  He has significant conversational dypsnea  D-dimer elevated, CTA neg for PE  Will check ECHO  IV steroids and IV mag given  Pulm consult: lungs clear  SOB liekly secondary to HF  Coughing  Shortness of breath improved  He does not want to leave until his cough is controlled and he knows whats causing it  Update 9/10:  Discontinue lisinopril  Give lasix and solumedrol IV  Starte IV abx  Check procal and overnight oximetry  Check nuclear stress christopher  ED Triage Vitals [09/09/19 1511]   Temperature Pulse Respirations Blood Pressure SpO2   98 5 °F (36 9 °C) 78 22 149/82 99 %      Temp Source Heart Rate Source Patient Position - Orthostatic VS BP Location FiO2 (%)   Oral Monitor Sitting Left arm --      Pain Score       3        Wt Readings from Last 1 Encounters:   09/09/19 115 kg (252 lb 10 4 oz)     Additional Vital Signs:   Time Temp Pulse Resp BP SpO2 O2 Device Patient Position - Orthostatic VS   09/10/19 0741     97 % Nasal cannula    09/10/19 0700 98 7 °F (37 1 °C) 90  137/90 98 % None (Room air) Sitting   09/09/19 2042  88   98 % None (Room air)    09/09/19 1946 98 7 °F (37 1 °C) 84 20 145/65 99 % None (Room air) Sitting   09/09/19 1800  86  105/71 98 %     09/09/19 1730  88  141/69 97 %     09/09/19 1700  76  152/70 100 %     09/09/19 1645  74   100 %     09/09/19 1630  68  130/67 100 %     09/09/19 1615  70   100 %     09/09/19 1603     97 % None (Room air)    09/09/19 1532            Pertinent Labs/Diagnostic Test Results:   ECHO:  LEFT VENTRICLE: Size was normal  Systolic function was normal  Ejection fraction was estimated to be 65 %  There were no regional wall motion abnormalities  Wall thickness was increased  Concentric hypertrophy was present  DOPPLER: There  was an increased relative contribution of atrial contraction to ventricular filling  Left ventricular diastolic function parameters were normal for the patient's age    EKG:  Sinus rhythm with marked sinus arrhythmia  Right bundle branch block  Left anterior fascicular block  CTA:  Mildly limited CTA of the chest demonstrates no gross evidence of an intraluminal filling defect to suggest a pulmonary embolus  No acute infiltrate or pleural effusion  Mild aneurysmal dilatation of the ascending thoracic aorta measuring 4 2 cm in diameter  CXR; No acute cardiopulmonary disease    Results from last 7 days   Lab Units 09/10/19  0639 09/09/19  1544   WBC Thousand/uL  --  5 76   HEMOGLOBIN g/dL  --  13 7   HEMATOCRIT %  --  41 5   PLATELETS Thousands/uL 205 189   NEUTROS ABS Thousands/µL  --  3 70         Results from last 7 days   Lab Units 09/09/19  1544   SODIUM mmol/L 139   POTASSIUM mmol/L 4 2   CHLORIDE mmol/L 101   CO2 mmol/L 29   ANION GAP mmol/L 9   BUN mg/dL 19   CREATININE mg/dL 1 01   EGFR ml/min/1 73sq m 77   CALCIUM mg/dL 9 3     Results from last 7 days   Lab Units 09/09/19  1544   AST U/L 25   ALT U/L 31   ALK PHOS U/L 90   TOTAL PROTEIN g/dL 7 3   ALBUMIN g/dL 3 8   TOTAL BILIRUBIN mg/dL 0 90         Results from last 7 days   Lab Units 09/09/19  1544   GLUCOSE RANDOM mg/dL 100       Results from last 7 days   Lab Units 09/09/19  1544   TROPONIN I ng/mL <0 02     Results from last 7 days   Lab Units 09/09/19  1544   D DIMER QUANT ng/ml (FEU) 1,164*       Results from last 7 days   Lab Units 09/09/19  1544   NT-PRO BNP pg/mL 340*       ED Treatment:   Medication Administration from 09/09/2019 1435 to 09/09/2019 1915       Date/Time Order Dose Route Action     09/09/2019 1603 albuterol inhalation solution 10 mg 10 mg Nebulization Given     09/09/2019 1603 ipratropium (ATROVENT) 0 02 % inhalation solution 0 5 mg 0 5 mg Nebulization Given     09/09/2019 1603 sodium chloride 0 9 % inhalation solution 3 mL 3 mL Nebulization Given     09/09/2019 1555 lidocaine (LIDODERM) 5 % patch 2 patch 2 patch Topical Medication Applied     09/09/2019 1825 albuterol inhalation solution 10 mg 10 mg Nebulization Given     09/09/2019 1825 ipratropium (ATROVENT) 0 02 % inhalation solution 1 mg 1 mg Nebulization Given     09/09/2019 1825 sodium chloride 0 9 % inhalation solution 12 mL 12 mL Nebulization Given     09/09/2019 1810 ketorolac (TORADOL) injection 15 mg 15 mg Intravenous Given        Past Medical History:   Diagnosis Date    Hypertension      Admitting Diagnosis: Cough [R05]  Rib pain [R07 81]  Persistent cough for 3 weeks or longer [R05]  Age/Sex: 75 y o  male  Admission Orders:  Up an OOB  BMP, CBC, TSH  ECHO  Current Facility-Administered Medications:  acetaminophen 650 mg Oral Q6H PRN   [START ON 9/11/2019] amLODIPine 10 mg Oral Daily   benzonatate 200 mg Oral TID   dextromethorphan-guaiFENesin 10 mL Oral Q4H PRN   enoxaparin 40 mg Subcutaneous Daily   guaiFENesin 600 mg Oral Q12H BONITA   hydrochlorothiazide 25 mg Oral Daily   ipratropium-albuterol 3 mL Nebulization Q6H PRN   methocarbamol 500 mg Oral Q6H PRN       IP CONSULT TO PULMONOLOGY    Network Utilization Review Department  Phone: [phone]; Fax [phone]  [email or web address]  ATTENTION: Please call with any questions or concerns to [phone]  and carefully listen to the prompts so that you are directed to the right person  Send all requests for admission clinical reviews, approved or denied determinations and any other requests to fax [phone]   All voicemails are confidential

## 2019-09-10 NOTE — PLAN OF CARE
Problem: RESPIRATORY - ADULT  Goal: Achieves optimal ventilation and oxygenation  Description  INTERVENTIONS:  - Assess for changes in respiratory status  - Assess for changes in mentation and behavior  - Position to facilitate oxygenation and minimize respiratory effort  - Oxygen administered by appropriate delivery if ordered  - Initiate smoking cessation education as indicated  - Encourage broncho-pulmonary hygiene including cough, deep breathe, Incentive Spirometry  - Assess the need for suctioning and aspirate as needed  - Assess and instruct to report SOB or any respiratory difficulty  - Respiratory Therapy support as indicated  Outcome: Progressing     Problem: PAIN - ADULT  Goal: Verbalizes/displays adequate comfort level or baseline comfort level  Description  Interventions:  - Encourage patient to monitor pain and request assistance  - Assess pain using appropriate pain scale  - Administer analgesics based on type and severity of pain and evaluate response  - Implement non-pharmacological measures as appropriate and evaluate response  - Consider cultural and social influences on pain and pain management  - Notify physician/advanced practitioner if interventions unsuccessful or patient reports new pain  Outcome: Progressing     Problem: DISCHARGE PLANNING  Goal: Discharge to home or other facility with appropriate resources  Description  INTERVENTIONS:  - Identify barriers to discharge w/patient and caregiver  - Arrange for needed discharge resources and transportation as appropriate  - Identify discharge learning needs (meds, wound care, etc )  - Arrange for interpretive services to assist at discharge as needed  - Refer to Case Management Department for coordinating discharge planning if the patient needs post-hospital services based on physician/advanced practitioner order or complex needs related to functional status, cognitive ability, or social support system  Outcome: Progressing     Problem: Knowledge Deficit  Goal: Patient/family/caregiver demonstrates understanding of disease process, treatment plan, medications, and discharge instructions  Description  Complete learning assessment and assess knowledge base  Interventions:  - Provide teaching at level of understanding  - Provide teaching via preferred learning methods  Outcome: Progressing     Problem: INFECTION - ADULT  Goal: Absence or prevention of progression during hospitalization  Description  INTERVENTIONS:  - Assess and monitor for signs and symptoms of infection  - Monitor lab/diagnostic results  - Monitor all insertion sites, i e  indwelling lines, tubes, and drains  - Monitor endotracheal if appropriate and nasal secretions for changes in amount and color  - [place] appropriate cooling/warming therapies per order  - Administer medications as ordered  - Instruct and encourage patient and family to use good hand hygiene technique  - Identify and instruct in appropriate isolation precautions for identified infection/condition  Outcome: Progressing  Goal: Absence of fever/infection during neutropenic period  Description  INTERVENTIONS:  - Monitor WBC    Outcome: Progressing     Problem: Potential for Falls  Goal: Patient will remain free of falls  Description  INTERVENTIONS:  - Assess patient frequently for physical needs  -  Identify cognitive and physical deficits and behaviors that affect risk of falls    -  [place] fall precautions as indicated by assessment   - Educate patient/family on patient safety including physical limitations  - Instruct patient to call for assistance with activity based on assessment  - Modify environment to reduce risk of injury  - Consider OT/PT consult to assist with strengthening/mobility  Outcome: Progressing

## 2019-09-10 NOTE — ASSESSMENT & PLAN NOTE
Started on Lisinopril in April, now with chronic cough  · Will discontinue Lisinopril and increase Norvasc  · Continue HCTZ  · Monitor

## 2019-09-10 NOTE — PLAN OF CARE
Problem: RESPIRATORY - ADULT  Goal: Achieves optimal ventilation and oxygenation  Description  INTERVENTIONS:  - Assess for changes in respiratory status  - Assess for changes in mentation and behavior  - Position to facilitate oxygenation and minimize respiratory effort  - Oxygen administered by appropriate delivery if ordered  - Initiate smoking cessation education as indicated  - Encourage broncho-pulmonary hygiene including cough, deep breathe, Incentive Spirometry  - Assess the need for suctioning and aspirate as needed  - Assess and instruct to report SOB or any respiratory difficulty  - Respiratory Therapy support as indicated  Outcome: Progressing     Problem: PAIN - ADULT  Goal: Verbalizes/displays adequate comfort level or baseline comfort level  Description  Interventions:  - Encourage patient to monitor pain and request assistance  - Assess pain using appropriate pain scale  - Administer analgesics based on type and severity of pain and evaluate response  - Implement non-pharmacological measures as appropriate and evaluate response  - Consider cultural and social influences on pain and pain management  - Notify physician/advanced practitioner if interventions unsuccessful or patient reports new pain  Outcome: Progressing     Problem: DISCHARGE PLANNING  Goal: Discharge to home or other facility with appropriate resources  Description  INTERVENTIONS:  - Identify barriers to discharge w/patient and caregiver  - Arrange for needed discharge resources and transportation as appropriate  - Identify discharge learning needs (meds, wound care, etc )  - Arrange for interpretive services to assist at discharge as needed  - Refer to Case Management Department for coordinating discharge planning if the patient needs post-hospital services based on physician/advanced practitioner order or complex needs related to functional status, cognitive ability, or social support system  Outcome: Progressing     Problem: Knowledge Deficit  Goal: Patient/family/caregiver demonstrates understanding of disease process, treatment plan, medications, and discharge instructions  Description  Complete learning assessment and assess knowledge base    Interventions:  - Provide teaching at level of understanding  - Provide teaching via preferred learning methods  Outcome: Progressing     Problem: INFECTION - ADULT  Goal: Absence or prevention of progression during hospitalization  Description  INTERVENTIONS:  - Assess and monitor for signs and symptoms of infection  - Monitor lab/diagnostic results  - Monitor all insertion sites, i e  indwelling lines, tubes, and drains  - Monitor endotracheal if appropriate and nasal secretions for changes in amount and color  - [place] appropriate cooling/warming therapies per order  - Administer medications as ordered  - Instruct and encourage patient and family to use good hand hygiene technique  - Identify and instruct in appropriate isolation precautions for identified infection/condition  Outcome: Progressing  Goal: Absence of fever/infection during neutropenic period  Description  INTERVENTIONS:  - Monitor WBC    Outcome: Progressing

## 2019-09-10 NOTE — PROGRESS NOTES
Progress Note Armando Montes 1955, 75 y o  male MRN: [id number]    Unit/Bed#: -01 Encounter: [id number]    Primary Care Provider: Alek Eng DO   Date and time admitted to hospital: 9/9/2019  3:29 PM        * Shortness of breath  Assessment & Plan  · Unclear etiology  Consider URI/tracheobronchitis vs cardiac  · Patient was apparently diagnosed with bronchitis on 8/16/19  He had follow-up with his PCP on 8/21/19 for Bronchitis, though was not getting better on z-pack, albuterol inhaler, and course of steroids  No prior hx of underlying lung disease  No smoking hx  Patient does not have history of CHF though concerning for possible cardiac component given chronicity and poor response to above interventions since beginning of August    · CXR is unremarkable  · D-dimer elevated to 1,164, though, CTA negative so do not suspect acute PE at this time  · Pro-BNP mildly elevated at 304  · ECHO: LVEF 65%, wall thickness increased, concentric hypertrophy present, aortic valve with trace to mild regurgitation, ascending aorta was dilated up to 4 4 cm      · Given Lasix 20 mg IV x1, Solumedrol 125 mg IV x1, and Alubterol nebulizer on admission   · Symptoms improved but returned the next afternoon   · Not hypoxic but intermittent coughing spells with diffuse wheezing     · Discontinue Lisinopril  · Start Rocephin 2gm IV daily and send sputum for culture (pt coughing up thick, yellow/green mucous)   · Check procalcitonin in am   · Start solumedrol 40 mg IV BID, Dounebs QID  · Check overnight oximetry, peak flows   · Obtain nuclear stress test in am  · Consult pulmonary, appreciate input   · Consider cards consult based on progress   · Monitor fever curve and WBC ct    Acute tracheobronchitis  Assessment & Plan  Suspected based on increased cough, sputum production, and diffuse wheezing   · Start Rocephin 2gm IV daily, Solumedrol 40 mg IV BID, Duonebs QID   · Check sputum culture, procalcitonin, over night pulse oximetry  · Pulmonary consult     Chronic cough  Assessment & Plan  Cough greater than 3 months  Started Lisinopril in 2019  · Will discontinue Lisinopril and increase Norvasc   · Mucinex BID, Tessalon Perles TID    Essential hypertension  Assessment & Plan  Started on Lisinopril in April, now with chronic cough  · Will discontinue Lisinopril and increase Norvasc  · Continue HCTZ  · Monitor       VTE Pharmacologic Prophylaxis:   Pharmacologic: Enoxaparin (Lovenox)  Mechanical VTE Prophylaxis in Place: Yes    Patient Centered Rounds: I have performed bedside rounds with nursing staff today  Discussions with Specialists or Other Care Team Provider: Nursing, CM    Education and Discussions with Family / Patient: I have answered all questions to the best of my ability  Wife at bedside  Time Spent for Care: 30 minutes  More than 50% of total time spent on counseling and coordination of care as described above  Current Length of Stay: 0 day(s)    Current Patient Status: Observation   Certification Statement: The patient will continue to require additional inpatient hospital stay due to persistent coughing spells, dyspnea for 3 months requiring further workup     Discharge Plan: Likely Wed pending stress test    Code Status: Level 1 - Full Code      Subjective:   Continues with harsh coughing spells  Feels like his chest gets tight during the spells but does not feel like central pressure  Audible wheezing  Feels like phlegm is stuck in his throat  Difficulty getting comfortable  Does not feel ready to go home  Objective:     Vitals:   Temp (24hrs), Av 5 °F (36 9 °C), Min:98 °F (36 7 °C), Max:98 7 °F (37 1 °C)    Temp:  [98 °F (36 7 °C)-98 7 °F (37 1 °C)] 98 °F (36 7 °C)  HR:  [68-92] 92  Resp:  [20] 20  BP: (105-152)/(65-90) 146/72  SpO2:  [96 %-100 %] 96 %  Body mass index is 37 31 kg/m²       Input and Output Summary (last 24 hours):     No intake or output data in the 24 hours ending 09/10/19 1600    Physical Exam:     Physical Exam   Constitutional: He is oriented to person, place, and time  He appears well-developed and well-nourished  No distress  Pleasant gentleman pacing around room on room air, harsh cough    HENT:   Head: Normocephalic  Neck: Normal range of motion  Cardiovascular: Normal rate, regular rhythm and intact distal pulses  Pulmonary/Chest: Accessory muscle usage (at times ) present  Tachypnea (at times) noted  No respiratory distress  He has decreased breath sounds in the right upper field, the right lower field, the left upper field and the left lower field  He has wheezes in the right lower field and the left lower field  He has no rhonchi  He has no rales  Abdominal: Soft  Bowel sounds are normal  He exhibits no distension  There is no tenderness  Obese    Musculoskeletal: Normal range of motion  He exhibits edema (trace )  He exhibits no tenderness  Neurological: He is alert and oriented to person, place, and time  He has normal reflexes  Skin: Skin is warm and dry  No rash noted  He is not diaphoretic  Psychiatric: He has a normal mood and affect  Judgment normal    Nursing note and vitals reviewed  Additional Data:     Labs:    Results from last 7 days   Lab Units 09/10/19  0639 09/09/19  1544   WBC Thousand/uL  --  5 76   HEMOGLOBIN g/dL  --  13 7   HEMATOCRIT %  --  41 5   PLATELETS Thousands/uL 205 189   NEUTROS PCT %  --  64   LYMPHS PCT %  --  23   MONOS PCT %  --  9   EOS PCT %  --  3     Results from last 7 days   Lab Units 09/09/19  1544   POTASSIUM mmol/L 4 2   CHLORIDE mmol/L 101   CO2 mmol/L 29   BUN mg/dL 19   CREATININE mg/dL 1 01   CALCIUM mg/dL 9 3   ALK PHOS U/L 90   ALT U/L 31   AST U/L 25           * I Have Reviewed All Lab Data Listed Above  * Additional Pertinent Lab Tests Reviewed:  All Labs Within Last 24 Hours Reviewed    Imaging:    Imaging Reports Reviewed Today Include: CTA chest, CXR, ECHO  Imaging Personally Reviewed by Myself Includes:  None     Recent Cultures (last 7 days):           Last 24 Hours Medication List:     Current Facility-Administered Medications:  acetaminophen 650 mg Oral Q6H PRN Isaura Izaguirre MD   [START ON 9/11/2019] amLODIPine 10 mg Oral Daily AKILA Valdez   benzonatate 200 mg Oral TID AKILA Valdez   cefTRIAXone 2,000 mg Intravenous Q24H AKILA Valdez   dextromethorphan-guaiFENesin 10 mL Oral Q4H PRN AKILA Valdez   enoxaparin 40 mg Subcutaneous Daily Isaura Izaguirre MD   guaiFENesin 600 mg Oral Q12H [facility] Isaura Izaguirre MD   hydrochlorothiazide 25 mg Oral Daily Jimmie Arriola PA-C   ipratropium-albuterol 3 mL Nebulization Q6H PRN Isaura Izaguirre MD   ipratropium-albuterol 3 mL Nebulization Q6H AKILA Valdez   methocarbamol 500 mg Oral Q6H PRN Jimmie Arriola PA-C   methylPREDNISolone sodium succinate 40 mg Intravenous Q12H [address], [place]        Today, Patient Was Seen By: AKILA Valdez    ** Please Note: Dictation voice to text software may have been used in the creation of this document   **

## 2019-09-10 NOTE — RESPIRATORY THERAPY NOTE
RT Protocol Note  Ebenezer Negro 75 y o  male MRN: [id number]  Unit/Bed#: -01 Encounter: [id number]    Assessment    Principal Problem:    Shortness of breath  Active Problems:    Essential hypertension      Home Pulmonary Medications:  None       Past Medical History:   Diagnosis Date    Hypertension      Social History     Socioeconomic History    Marital status: Single     Spouse name: None    Number of children: None    Years of education: None    Highest education level: None   Occupational History    None   Social Needs    Financial resource strain: None    Food insecurity:     Worry: None     Inability: None    Transportation needs:     Medical: None     Non-medical: None   Tobacco Use    Smoking status: Never Smoker   Substance and Sexual Activity    Alcohol use: Yes     Comment: occasional     Drug use: No    Sexual activity: None   Lifestyle    Physical activity:     Days per week: None     Minutes per session: None    Stress: None   Relationships    Social connections:     Talks on phone: None     Gets together: None     Attends Islam service: None     Active member of club or organization: None     Attends meetings of clubs or organizations: None     Relationship status: None    Intimate partner violence:     Fear of current or ex partner: None     Emotionally abused: None     Physically abused: None     Forced sexual activity: None   Other Topics Concern    None   Social History Narrative    None       Subjective         Objective    Physical Exam:   Assessment Type: Assess only  General Appearance: Awake, Alert  Respiratory Pattern: Normal  Chest Assessment: Chest expansion symmetrical  Bilateral Breath Sounds: Diminished, Clear    Vitals:  Blood pressure 145/65, pulse 88, temperature 98 7 °F (37 1 °C), temperature source Oral, resp  rate 20, height 5' 9" (1 753 m), weight 115 kg (252 lb 10 4 oz), SpO2 98 %            Imaging and other studies: I have personally reviewed pertinent reports  Plan    Respiratory Plan: No distress/Pulmonary history        Resp Comments: (P) Pt  was seen per protocol  Spo2 was 98% on room air  Pt  denied being a smoker  Does not use CPAP/BIPAP at home, does not use oxygen  Breath sounds was diminished and clear   No indication for scheduled aerosol tx  at this time  We will continue to monitor for any change in condition

## 2019-09-10 NOTE — ASSESSMENT & PLAN NOTE
· POA SOB and cough  · He had just gotten back to his room from walking around the floor and appeared to be comfortable by slightly SOB   Was coughing throughout encounter but stated that he was feeling better  · Denies postnasal drip   · O2 saturation is adequate on room air   · SOB likely secondary to HF  · Elevated proBNP  · Mild pulmonary vascular congestion on CXR  · No central or peripheral cyanosis  · Lungs clear to auscultation   · Plan  · Lasix for diuresis, treatment as per primary team  · Discontinue ACEI (likely cause of cough)

## 2019-09-11 ENCOUNTER — APPOINTMENT (OUTPATIENT)
Dept: NUCLEAR MEDICINE | Facility: HOSPITAL | Age: 64
DRG: 204 | End: 2019-09-11
Payer: COMMERCIAL

## 2019-09-11 ENCOUNTER — APPOINTMENT (OUTPATIENT)
Dept: NON INVASIVE DIAGNOSTICS | Facility: HOSPITAL | Age: 64
DRG: 204 | End: 2019-09-11
Payer: COMMERCIAL

## 2019-09-11 PROBLEM — R06.00 DYSPNEA: Status: ACTIVE | Noted: 2019-09-09

## 2019-09-11 LAB
ANION GAP SERPL CALCULATED.3IONS-SCNC: 10 MMOL/L (ref 4–13)
BUN SERPL-MCNC: 28 MG/DL (ref 5–25)
CALCIUM SERPL-MCNC: 9.2 MG/DL (ref 8.3–10.1)
CHLORIDE SERPL-SCNC: 101 MMOL/L (ref 100–108)
CO2 SERPL-SCNC: 26 MMOL/L (ref 21–32)
CREAT SERPL-MCNC: 1.07 MG/DL (ref 0.6–1.3)
ERYTHROCYTE [DISTWIDTH] IN BLOOD BY AUTOMATED COUNT: 13.2 % (ref 11.6–15.1)
GFR SERPL CREATININE-BSD FRML MDRD: 72 ML/MIN/1.73SQ M
GLUCOSE P FAST SERPL-MCNC: 135 MG/DL (ref 65–99)
GLUCOSE SERPL-MCNC: 135 MG/DL (ref 65–140)
HCT VFR BLD AUTO: 40.6 % (ref 36.5–49.3)
HGB BLD-MCNC: 13.5 G/DL (ref 12–17)
MCH RBC QN AUTO: 31 PG (ref 26.8–34.3)
MCHC RBC AUTO-ENTMCNC: 33.3 G/DL (ref 31.4–37.4)
MCV RBC AUTO: 93 FL (ref 82–98)
PLATELET # BLD AUTO: 217 THOUSANDS/UL (ref 149–390)
PMV BLD AUTO: 8.5 FL (ref 8.9–12.7)
POTASSIUM SERPL-SCNC: 4.5 MMOL/L (ref 3.5–5.3)
PROCALCITONIN SERPL-MCNC: <0.05 NG/ML
RBC # BLD AUTO: 4.36 MILLION/UL (ref 3.88–5.62)
SODIUM SERPL-SCNC: 137 MMOL/L (ref 136–145)
WBC # BLD AUTO: 12.6 THOUSAND/UL (ref 4.31–10.16)

## 2019-09-11 PROCEDURE — 94760 N-INVAS EAR/PLS OXIMETRY 1: CPT

## 2019-09-11 PROCEDURE — 93016 CV STRESS TEST SUPVJ ONLY: CPT | Performed by: INTERNAL MEDICINE

## 2019-09-11 PROCEDURE — 94640 AIRWAY INHALATION TREATMENT: CPT

## 2019-09-11 PROCEDURE — 93018 CV STRESS TEST I&R ONLY: CPT | Performed by: INTERNAL MEDICINE

## 2019-09-11 PROCEDURE — 78452 HT MUSCLE IMAGE SPECT MULT: CPT | Performed by: INTERNAL MEDICINE

## 2019-09-11 PROCEDURE — A9502 TC99M TETROFOSMIN: HCPCS

## 2019-09-11 PROCEDURE — 94150 VITAL CAPACITY TEST: CPT

## 2019-09-11 PROCEDURE — 99232 SBSQ HOSP IP/OBS MODERATE 35: CPT | Performed by: INTERNAL MEDICINE

## 2019-09-11 PROCEDURE — 85027 COMPLETE CBC AUTOMATED: CPT | Performed by: NURSE PRACTITIONER

## 2019-09-11 PROCEDURE — 84145 PROCALCITONIN (PCT): CPT | Performed by: NURSE PRACTITIONER

## 2019-09-11 PROCEDURE — 80048 BASIC METABOLIC PNL TOTAL CA: CPT | Performed by: NURSE PRACTITIONER

## 2019-09-11 PROCEDURE — 78452 HT MUSCLE IMAGE SPECT MULT: CPT

## 2019-09-11 PROCEDURE — 93017 CV STRESS TEST TRACING ONLY: CPT

## 2019-09-11 RX ORDER — LORAZEPAM 2 MG/ML
0.5 INJECTION INTRAMUSCULAR ONCE
Status: COMPLETED | OUTPATIENT
Start: 2019-09-11 | End: 2019-09-11

## 2019-09-11 RX ORDER — METHYLPREDNISOLONE SODIUM SUCCINATE 40 MG/ML
20 INJECTION, POWDER, LYOPHILIZED, FOR SOLUTION INTRAMUSCULAR; INTRAVENOUS EVERY 12 HOURS SCHEDULED
Status: DISCONTINUED | OUTPATIENT
Start: 2019-09-11 | End: 2019-09-11

## 2019-09-11 RX ORDER — METHYLPREDNISOLONE SODIUM SUCCINATE 40 MG/ML
20 INJECTION, POWDER, LYOPHILIZED, FOR SOLUTION INTRAMUSCULAR; INTRAVENOUS EVERY 8 HOURS SCHEDULED
Status: DISCONTINUED | OUTPATIENT
Start: 2019-09-11 | End: 2019-09-12 | Stop reason: HOSPADM

## 2019-09-11 RX ORDER — HYDROCODONE POLISTIREX AND CHLORPHENIRAMINE POLISTIREX 10; 8 MG/5ML; MG/5ML
5 SUSPENSION, EXTENDED RELEASE ORAL EVERY 12 HOURS PRN
Status: DISCONTINUED | OUTPATIENT
Start: 2019-09-11 | End: 2019-09-12 | Stop reason: HOSPADM

## 2019-09-11 RX ORDER — LEVALBUTEROL 1.25 MG/.5ML
1.25 SOLUTION, CONCENTRATE RESPIRATORY (INHALATION)
Status: DISCONTINUED | OUTPATIENT
Start: 2019-09-11 | End: 2019-09-12 | Stop reason: HOSPADM

## 2019-09-11 RX ORDER — GUAIFENESIN 600 MG
1200 TABLET, EXTENDED RELEASE 12 HR ORAL EVERY 12 HOURS SCHEDULED
Status: DISCONTINUED | OUTPATIENT
Start: 2019-09-11 | End: 2019-09-12 | Stop reason: HOSPADM

## 2019-09-11 RX ORDER — LORATADINE 10 MG/1
10 TABLET ORAL DAILY
Status: DISCONTINUED | OUTPATIENT
Start: 2019-09-11 | End: 2019-09-12 | Stop reason: HOSPADM

## 2019-09-11 RX ORDER — ALBUTEROL SULFATE 90 UG/1
1 AEROSOL, METERED RESPIRATORY (INHALATION) EVERY 4 HOURS PRN
Status: DISCONTINUED | OUTPATIENT
Start: 2019-09-11 | End: 2019-09-12 | Stop reason: HOSPADM

## 2019-09-11 RX ORDER — FUROSEMIDE 10 MG/ML
20 INJECTION INTRAMUSCULAR; INTRAVENOUS ONCE
Status: COMPLETED | OUTPATIENT
Start: 2019-09-11 | End: 2019-09-11

## 2019-09-11 RX ADMIN — IPRATROPIUM BROMIDE 0.5 MG: 0.5 SOLUTION RESPIRATORY (INHALATION) at 19:26

## 2019-09-11 RX ADMIN — GUAIFENESIN 600 MG: 600 TABLET, EXTENDED RELEASE ORAL at 08:21

## 2019-09-11 RX ADMIN — METHOCARBAMOL TABLETS 500 MG: 500 TABLET, COATED ORAL at 16:12

## 2019-09-11 RX ADMIN — HYDROCHLOROTHIAZIDE 25 MG: 25 TABLET ORAL at 08:21

## 2019-09-11 RX ADMIN — REGADENOSON 0.4 MG: 0.08 INJECTION, SOLUTION INTRAVENOUS at 11:54

## 2019-09-11 RX ADMIN — METHYLPREDNISOLONE SODIUM SUCCINATE 40 MG: 40 INJECTION, POWDER, FOR SOLUTION INTRAMUSCULAR; INTRAVENOUS at 08:21

## 2019-09-11 RX ADMIN — LEVALBUTEROL 1.25 MG: 1.25 SOLUTION, CONCENTRATE RESPIRATORY (INHALATION) at 19:26

## 2019-09-11 RX ADMIN — METHOCARBAMOL TABLETS 500 MG: 500 TABLET, COATED ORAL at 08:21

## 2019-09-11 RX ADMIN — HYDROCODONE POLISTIREX AND CHLORPHENIRAMINE POLISTIREX 5 ML: 10; 8 SUSPENSION, EXTENDED RELEASE ORAL at 15:31

## 2019-09-11 RX ADMIN — BENZONATATE 200 MG: 100 CAPSULE ORAL at 16:11

## 2019-09-11 RX ADMIN — FUROSEMIDE 20 MG: 10 INJECTION, SOLUTION INTRAMUSCULAR; INTRAVENOUS at 15:31

## 2019-09-11 RX ADMIN — BENZONATATE 200 MG: 100 CAPSULE ORAL at 08:21

## 2019-09-11 RX ADMIN — METHYLPREDNISOLONE SODIUM SUCCINATE 20 MG: 40 INJECTION, POWDER, FOR SOLUTION INTRAMUSCULAR; INTRAVENOUS at 21:15

## 2019-09-11 RX ADMIN — IPRATROPIUM BROMIDE 0.5 MG: 0.5 SOLUTION RESPIRATORY (INHALATION) at 14:06

## 2019-09-11 RX ADMIN — ENOXAPARIN SODIUM 40 MG: 40 INJECTION SUBCUTANEOUS at 08:21

## 2019-09-11 RX ADMIN — GUAIFENESIN AND DEXTROMETHORPHAN 10 ML: 100; 10 SYRUP ORAL at 08:21

## 2019-09-11 RX ADMIN — LORAZEPAM 0.5 MG: 2 INJECTION INTRAMUSCULAR; INTRAVENOUS at 16:12

## 2019-09-11 RX ADMIN — GUAIFENESIN 1200 MG: 600 TABLET, EXTENDED RELEASE ORAL at 21:15

## 2019-09-11 RX ADMIN — IPRATROPIUM BROMIDE AND ALBUTEROL SULFATE 3 ML: 2.5; .5 SOLUTION RESPIRATORY (INHALATION) at 09:31

## 2019-09-11 RX ADMIN — AMLODIPINE BESYLATE 10 MG: 10 TABLET ORAL at 08:21

## 2019-09-11 RX ADMIN — BENZONATATE 200 MG: 100 CAPSULE ORAL at 21:15

## 2019-09-11 RX ADMIN — CEFTRIAXONE SODIUM 2000 MG: 10 INJECTION, POWDER, FOR SOLUTION INTRAVENOUS at 16:12

## 2019-09-11 RX ADMIN — LEVALBUTEROL 1.25 MG: 1.25 SOLUTION, CONCENTRATE RESPIRATORY (INHALATION) at 14:06

## 2019-09-11 RX ADMIN — METHYLPREDNISOLONE SODIUM SUCCINATE 20 MG: 40 INJECTION, POWDER, FOR SOLUTION INTRAMUSCULAR; INTRAVENOUS at 15:31

## 2019-09-11 RX ADMIN — LORATADINE 10 MG: 10 TABLET ORAL at 13:28

## 2019-09-11 NOTE — ASSESSMENT & PLAN NOTE
Cough greater than 3 months   Started Lisinopril in April 2019  Likely side effect of lisinopril  · Discontinued Lisinopril on 9/10/2019   · Increased to Norvasc 10 mg daily  · Continue Mucinex BID, Tessalon Perles TID, Tussionex PRN,   · Steroid taper as above  · Outpatient follow-up

## 2019-09-11 NOTE — PROGRESS NOTES
Progress Note [facility] 1955, 75 y o  male MRN: [id number]    Unit/Bed#: -01 Encounter: [id number]    Primary Care Provider: Omid Powers DO   Date and time admitted to hospital: 9/9/2019  3:29 PM        * Dyspnea  Assessment & Plan  · Unclear etiology  Likely side effect of lisinopril induced cough causing coughing spells +/- seasonal allergies and sleep apnea  Consider URI/tracheobronchitis  Less likely to be heart failure  · Patient was apparently diagnosed with bronchitis on 8/16/19  He had follow-up with his PCP on 8/21/19 for Bronchitis, though was not getting better on z-pack, albuterol inhaler, and course of steroids  No prior hx of underlying lung disease  No smoking hx  Patient does not have history of CHF though concerning for possible cardiac component given chronicity and poor response to above interventions since beginning of August    · CXR unremarkable  · D-dimer elevated, though, CTA negative so do not suspect acute PE   · Pro-BNP mildly elevated at 304  · ECHO: LVEF 65%, wall thickness increased, concentric hypertrophy present, aortic valve with trace to mild regurgitation, ascending aorta was dilated up to 4 4 cm  · Nuclear stress test unremarkable  · Sputum culture abnormal but contaminated  · Overnight sleep screen showed patient desatted into the 60s for a total of 27 minutes    · Given Lasix 20 mg IV, Solumedrol 125 mg IV, and Alubterol nebulizer in ED on admission with minimal improvement in symptoms  · Continues with frequent coughing spells causing diffuse wheezing, lightheadedness, and dizziness  · Not hypoxic but severe, intermittent coughing spells with diffuse wheezing     · Discontinue Lisinopril  · Continue Solumedrol 20 mg IV TID and bronchodilators around the clock  · Robaxin for muscle spasms  · Ativan 0 5 mg IV x1 for anxiety    Chronic cough  Assessment & Plan  Cough greater than 3 months   Started Lisinopril in April 2019  Likely side effect of lisinopril  · Discontinued Lisinopril on 9/10/2019   · Increased to Norvasc 10 mg daily  · Continue Mucinex BID, Tessalon Perles TID, Tussionex PRN, IV steroids, and bronchodilators  · Ativan 0 5 mg IV x1    Essential hypertension  Assessment & Plan  · /73  · Discontinue lisinopril due to lisinopril induced cough  · Continue newly increased Norvasc at 10 mg daily  · Continue hydrochlorothiazide  · Low-sodium diet  · Outpatient follow-up      VTE Pharmacologic Prophylaxis:   Pharmacologic: Enoxaparin (Lovenox)  Mechanical VTE Prophylaxis in Place: Yes    Patient Centered Rounds: I have performed bedside rounds with nursing staff today  Discussions with Specialists or Other Care Team Provider: Nursing, CM, pulmonology, my attending    Education and Discussions with Family / Patient: I have answered all questions to the best of my ability  Wife and son at bedside  Time Spent for Care: 30 minutes  More than 50% of total time spent on counseling and coordination of care as described above  Current Length of Stay: 0 day(s)    Current Patient Status: Inpatient   Certification Statement: The patient will continue to require additional inpatient hospital stay due to Persistent cough and shortness of breath for 3 months on IV steroids and around the clock nebs    Discharge Plan: Likely Thursday pending coughing spells/shortness of breath  Code Status: Level 1 - Full Code      Subjective:   Overall, patient feels much worse compared to yesterday  He states no improvement since he has been admitted  He continues with persistent coughing spells for which he feels like he is going to faint  His coughing spells or causing significant rib pain and muscle spasm  He is experiencing intermittent lightheadedness and dizziness with his coughing spells  His family is at bedside and very concerned  Patient would like to trial Ativan x1  Patient denies any vomiting, diarrhea      Objective:     Vitals:   Temp (24hrs), Av 3 °F (36 8 °C), Min:97 9 °F (36 6 °C), Max:98 5 °F (36 9 °C)    Temp:  [97 9 °F (36 6 °C)-98 5 °F (36 9 °C)] 98 5 °F (36 9 °C)  HR:  [] 103  Resp:  [20] 20  BP: (138-144)/(73-76) 144/74  SpO2:  [94 %-98 %] 94 %  Body mass index is 37 31 kg/m²  Input and Output Summary (last 24 hours): Intake/Output Summary (Last 24 hours) at 2019 1616  Last data filed at 2019 1434  Gross per 24 hour   Intake 960 ml   Output 0 ml   Net 960 ml       Physical Exam:     Physical Exam   Constitutional: He is oriented to person, place, and time  He appears well-developed  No distress  Pleasant gentleman uncomfortable in bed due to persistent cough/coughing spasm   HENT:   Head: Normocephalic  Neck: Normal range of motion  Cardiovascular: Normal rate, regular rhythm and intact distal pulses  Pulmonary/Chest: Accessory muscle usage present  Tachypnea noted  No respiratory distress (mild)  He has decreased breath sounds in the right upper field and the left upper field  He has wheezes in the right lower field and the left lower field  He has no rhonchi  He has no rales  Abdominal: Soft  Bowel sounds are normal  He exhibits no distension  There is no tenderness  Musculoskeletal: Normal range of motion  He exhibits edema (trace RLE)  He exhibits no tenderness  Neurological: He is alert and oriented to person, place, and time  He has normal reflexes  Skin: Skin is warm and dry  No rash noted  He is not diaphoretic  Flushed facial tone    Psychiatric: He has a normal mood and affect  Judgment normal    Nursing note and vitals reviewed        Additional Data:     Labs:    Results from last 7 days   Lab Units 19  0606  19  1544   WBC Thousand/uL 12 60*  --  5 76   HEMOGLOBIN g/dL 13 5  --  13 7   HEMATOCRIT % 40 6  --  41 5   PLATELETS Thousands/uL 217   < > 189   NEUTROS PCT %  --   --  64   LYMPHS PCT %  --   --  23   MONOS PCT %  --   --  9   EOS PCT %  --   --  3    < > = values in this interval not displayed  Results from last 7 days   Lab Units 09/11/19  0606 09/09/19  1544   POTASSIUM mmol/L 4 5 4 2   CHLORIDE mmol/L 101 101   CO2 mmol/L 26 29   BUN mg/dL 28* 19   CREATININE mg/dL 1 07 1 01   CALCIUM mg/dL 9 2 9 3   ALK PHOS U/L  --  90   ALT U/L  --  31   AST U/L  --  25           * I Have Reviewed All Lab Data Listed Above  * Additional Pertinent Lab Tests Reviewed: All Labs Within Last 24 Hours Reviewed    Imaging:    Imaging Reports Reviewed Today Include: CTA chest, CXR, ECHO  Imaging Personally Reviewed by Myself Includes:  None     Recent Cultures (last 7 days):     Results from last 7 days   Lab Units 09/10/19  1542   SPUTUM CULTURE  Test not performed  Suggest repeat specimen  GRAM STAIN RESULT  >10 squamous epithelial cells/lpf, indicating orophayngeal contamination  *  1+ Polys*  3+ Gram positive cocci in pairs, chains and clusters*  2+ Gram negative rods*  Rare Yeast*       Last 24 Hours Medication List:     Current Facility-Administered Medications:  acetaminophen 650 mg Oral Q6H PRN Judy Weaver MD    amLODIPine 10 mg Oral Daily Aylin Goodman, MIKENP    benzonatate 200 mg Oral TID MIKE HayesNP    cefTRIAXone 2,000 mg Intravenous Q24H AKILA Hayes Last Rate: 2,000 mg (09/11/19 1612)   dextromethorphan-guaiFENesin 10 mL Oral Q4H PRN MIKE HayesNP    enoxaparin 40 mg Subcutaneous Daily Judy Weaver MD    guaiFENesin 1,200 mg Oral Q12H [facility] AKILA Hayes    hydrochlorothiazide 25 mg Oral Daily Jimmie Aparicio PA-C    hydrocodone-chlorpheniramine polistirex 5 mL Oral Q12H PRN AKILA Hayes    ipratropium 0 5 mg Nebulization TID Micki Rao MD    ipratropium-albuterol 3 mL Nebulization Q6H PRN Judy Weaver MD    levalbuterol 1 25 mg Nebulization TID Micki Rao MD    loratadine 10 mg Oral Daily AKILA Hayes    methocarbamol 500 mg Oral Q6H PRN Keerthi Cuellar PA-C methylPREDNISolone sodium succinate 20 mg Intravenous [facility] Logan Harada, CRNP    phenol 1 spray Mouth/Throat Q2H PRN Logan Harada, CRNP         Today, Patient Was Seen By: Logan Harada, CRNP    ** Please Note: Dictation voice to text software may have been used in the creation of this document   **

## 2019-09-11 NOTE — RESPIRATORY THERAPY NOTE
PEAK FLOW     09/11/19 0955   Additional Assessments   Pulse 70   Respirations 20   SpO2 98 %   Pre Peak Expiratory Flow Rate  LPM   Post Peak Expiratory Flow Rate  LPM   $ Vital Capacity Mech/Peak Flow Yes   Position Sitting

## 2019-09-11 NOTE — ASSESSMENT & PLAN NOTE
· Intractable cough secondary to ACE-inhibitor some improvement this morning  · This lady is improved  · Patient was apparently diagnosed with bronchitis on 8/16/19  He had follow-up with his PCP on 8/21/19 for Bronchitis, though was not getting better on z-pack, albuterol inhaler, and course of steroids  No prior hx of underlying lung disease  No smoking hx  Patient does not have history of CHF though concerning for possible cardiac component given chronicity and poor response to above interventions since beginning of August    · CXR unremarkable  · D-dimer elevated, though, CTA negative so do not suspect acute PE   · Pro-BNP mildly elevated at 304  · ECHO: LVEF 65%, wall thickness increased, concentric hypertrophy present, aortic valve with trace to mild regurgitation, ascending aorta was dilated up to 4 4 cm  · Nuclear stress test unremarkable  · Sputum culture abnormal but contaminated  · Overnight sleep screen showed patient desatted into the 60s for a total of 27 minutes    · Given Lasix 20 mg IV, Solumedrol 125 mg IV, and Alubterol nebulizer in ED on admission with minimal improvement in symptoms  · Continues with frequent coughing spells causing diffuse wheezing, lightheadedness, and dizziness  · No wheezing this morning  · Discontinue Lisinopril  · Changed to steroid taper  · Robaxin for muscle spasms  · P o   Cough suppressant  · Discharge patient home follow-up with PCP

## 2019-09-11 NOTE — PLAN OF CARE
Problem: RESPIRATORY - ADULT  Goal: Achieves optimal ventilation and oxygenation  Description  INTERVENTIONS:  - Assess for changes in respiratory status  - Assess for changes in mentation and behavior  - Position to facilitate oxygenation and minimize respiratory effort  - Oxygen administered by appropriate delivery if ordered  - Initiate smoking cessation education as indicated  - Encourage broncho-pulmonary hygiene including cough, deep breathe, Incentive Spirometry  - Assess the need for suctioning and aspirate as needed  - Assess and instruct to report SOB or any respiratory difficulty  - Respiratory Therapy support as indicated  Outcome: Progressing     Problem: PAIN - ADULT  Goal: Verbalizes/displays adequate comfort level or baseline comfort level  Description  Interventions:  - Encourage patient to monitor pain and request assistance  - Assess pain using appropriate pain scale  - Administer analgesics based on type and severity of pain and evaluate response  - Implement non-pharmacological measures as appropriate and evaluate response  - Consider cultural and social influences on pain and pain management  - Notify physician/advanced practitioner if interventions unsuccessful or patient reports new pain  Outcome: Progressing     Problem: Knowledge Deficit  Goal: Patient/family/caregiver demonstrates understanding of disease process, treatment plan, medications, and discharge instructions  Description  Complete learning assessment and assess knowledge base    Interventions:  - Provide teaching at level of understanding  - Provide teaching via preferred learning methods  Outcome: Progressing     Problem: INFECTION - ADULT  Goal: Absence or prevention of progression during hospitalization  Description  INTERVENTIONS:  - Assess and monitor for signs and symptoms of infection  - Monitor lab/diagnostic results  - Monitor all insertion sites, i e  indwelling lines, tubes, and drains  - Monitor endotracheal if appropriate and nasal secretions for changes in amount and color  - [place] appropriate cooling/warming therapies per order  - Administer medications as ordered  - Instruct and encourage patient and family to use good hand hygiene technique  - Identify and instruct in appropriate isolation precautions for identified infection/condition  Outcome: Progressing  Goal: Absence of fever/infection during neutropenic period  Description  INTERVENTIONS:  - Monitor WBC    Outcome: Progressing     Problem: CARDIOVASCULAR - ADULT  Goal: Maintains optimal cardiac output and hemodynamic stability  Description  INTERVENTIONS:  - Monitor I/O, vital signs and rhythm  - Monitor for S/S and trends of decreased cardiac output  - Administer and titrate ordered vasoactive medications to optimize hemodynamic stability  - Assess quality of pulses, skin color and temperature  - Assess for signs of decreased coronary artery perfusion  - Instruct patient to report change in severity of symptoms  Outcome: Progressing

## 2019-09-11 NOTE — ASSESSMENT & PLAN NOTE
Suspected based on increased cough, sputum production, and diffuse wheezing   · Less likely to be acute tracheobronchitis as Procalcitonin negative  Chest x-ray negative  Afebrile    · Will discontinue antibiotics

## 2019-09-11 NOTE — ASSESSMENT & PLAN NOTE
· /73  · Discontinue lisinopril due to lisinopril induced cough  · Continue newly increased Norvasc at 10 mg daily  · Continue hydrochlorothiazide  · Low-sodium diet  · Outpatient follow-up

## 2019-09-11 NOTE — SOCIAL WORK
LOS 0 DAYS  PATIENT IS NOT A BUNDLE  PATIENT IS NOT A READMISSION  CM met with patient at bedside, patient alert and oriented and seated on EOB  Patient resides alone in a single story home in [place]  There are [address] and patient reports he's able to navigate his home without concern  Patient reports he does have family close by that can help at home if needed but he is independent at baseline  Patient denies the use of DME and is independent with all ADLs  Patient denies history of VNA/SNF  Patient utilizes [employer], has Rx plan and is able to afford all copays  Patient denies history of MH/substance use  Patient denies POA/AD but requested and received information  Patient reports that he is employed though currently he's on short term disability following a knee replacement  Patient does drive and will drive himself home at discharge  CM reviewed discussion with ARNIE and recommendation for nocturnal O2, following his sleep study last night, as well as nebulizer  Patient stated he'd like to obtain his DME through [facility] and CM uploaded Rx for nocturnal O2 and nebulizer through [address] [place] [address] Drive as well as sleep study results  CM obtained by Shahid that patient does not qualify for O2 or nebulizer based on current diagnoses  SLIM aware and will reevaluate patient tomorrow  CM Department will continue to follow through discharge  CM reviewed discharge planning process including the following: identifying caregivers at home, preference for d/c planning needs,  availability of treatment team to discuss questions or concerns patient and/or family may have regarding diagnosis, plan of care, old or new medications and discharge planning   CM will continue to follow for care coordination and update assessment as appropriate

## 2019-09-11 NOTE — UTILIZATION REVIEW
Continued Stay Review    Date: 9/11/2019                        Current Patient Class: Inpatient  Current Level of Care: OBS 9/9 ; INPATIENT 9/11/2019 1604 med surg  OBSERVATION 9/9/2019 1815 CONVERTED TO INPATIENT ADMISSION 9/11/2019 1604 DUE TO INTRACTABLE COUGHING WITH DYSPNEA WITH ACCESSORY MUSCLE USE, TACHYPNEA AND DECREASED BREATH SOUNDS    09/11/19 1604  Inpatient Admission Once     Transfer Service: General Medicine    Expected Discharge Date: 09/13/19       Question Answer Comment   Admitting Physician Yuan MCKEON    Level of Care Med Surg    Estimated length of stay More than 2 Midnights    Certification I certify that inpatient services are medically necessary for this patient for a duration of greater than two midnights  See H&P and MD Progress Notes for additional information about the patient's course of treatment  09/11/19 1604       HPI:64 y o  male initially admitted on 09/09/19 1815 to the ED from home with complaints of cough, shortness of breath for 1 month prior to his arrival   Admitted under observation for shortness of breath  Assessment/Plan: 9/10 attending  49-year-old male presenting with prolonged cough and orthopnea  The patient an elevated BNP concerning for volume overload for which he was treated with diuresis  The patient's cough is possibly multifactorial in the setting of his seasonal allergies  He was encouraged to resume his over-the-counter allergy medications  In addition, the patient was started on an ACE-inhibitor in the past several months  Recommend discontinuing these inhibitor as it may be the cause of his cough  The patient has multiple factors positive under his stop [place]  He notes snoring as well as daytime somnolence  He would benefit from an outpatient sleep study  9/11 attending:  Dyspnea  Assessment & Plan  · Unclear etiology  Likely side effect of lisinopril induced cough causing coughing spells +/- seasonal allergies and sleep apnea  Consider URI/tracheobronchitis  Less likely to be heart failure  · Patient was apparently diagnosed with bronchitis on 8/16/19  He had follow-up with his PCP on 8/21/19 for Bronchitis, though was not getting better on z-pack, albuterol inhaler, and course of steroids  No prior hx of underlying lung disease  No smoking hx  Patient does not have history of CHF though concerning for possible cardiac component given chronicity and poor response to above interventions since beginning of August    · CXR unremarkable  · D-dimer elevated, though, CTA negative so do not suspect acute PE   · Pro-BNP mildly elevated at 304  · ECHO: LVEF 65%, wall thickness increased, concentric hypertrophy present, aortic valve with trace to mild regurgitation, ascending aorta was dilated up to 4 4 cm  · Nuclear stress test unremarkable  · Sputum culture abnormal but contaminated  · Overnight sleep screen showed patient desatted into the 60s for a total of 27 minutes     · Given Lasix 20 mg IV, Solumedrol 125 mg IV, and Alubterol nebulizer in ED on admission with minimal improvement in symptoms  · Continues with frequent coughing spells causing diffuse wheezing, lightheadedness, and dizziness  · Not hypoxic but severe, intermittent coughing spells with diffuse wheezing   · Discontinue Lisinopril  · Continue Solumedrol 20 mg IV TID and bronchodilators around the clock  · Robaxin for muscle spasms  · Ativan 0 5 mg IV x1 for anxiety     Chronic cough  Assessment & Plan  Cough greater than 3 months  Started Lisinopril in April 2019  Likely side effect of lisinopril  · Discontinued Lisinopril on 9/10/2019   · Increased to Norvasc 10 mg daily  · Continue Mucinex BID, Tessalon Perles TID, Tussionex PRN, IV steroids, and bronchodilators  · Ativan 0 5 mg IV x1     Pertinent Labs/Diagnostic Results:   9/11/2019 NM stress test:  STRESS SUMMARY: Duration of pharmacologic stress was 3 min   Maximal heart rate during stress was 103 bpm  There was resting hypertension with an appropriate blood pressure response to stress  The rate-pressure product for the peak heart  rate and blood pressure was 66410  There was no chest pain during stress  The stress test was terminated due to protocol completion  Pre oxygen saturation: 95 %  Peak oxygen saturation: 100 %  There were no stress arrhythmias or conduction  abnormalities  The stress ECG was non-diagnostic due to resting ST/T wave abnormality  Results from last 7 days   Lab Units 09/11/19  0606 09/10/19  0639 09/09/19  1544   WBC Thousand/uL 12 60*  --  5 76   HEMOGLOBIN g/dL 13 5  --  13 7   HEMATOCRIT % 40 6  --  41 5   PLATELETS Thousands/uL 217 205 189   NEUTROS ABS Thousands/µL  --   --  3 70         Results from last 7 days   Lab Units 09/11/19  0606 09/09/19  1544   SODIUM mmol/L 137 139   POTASSIUM mmol/L 4 5 4 2   CHLORIDE mmol/L 101 101   CO2 mmol/L 26 29   ANION GAP mmol/L 10 9   BUN mg/dL 28* 19   CREATININE mg/dL 1 07 1 01   EGFR ml/min/1 73sq m 72 77   CALCIUM mg/dL 9 2 9 3     Results from last 7 days   Lab Units 09/09/19  1544   AST U/L 25   ALT U/L 31   ALK PHOS U/L 90   TOTAL PROTEIN g/dL 7 3   ALBUMIN g/dL 3 8   TOTAL BILIRUBIN mg/dL 0 90         Results from last 7 days   Lab Units 09/11/19  0606 09/09/19  1544   GLUCOSE RANDOM mg/dL 135 100                                Results from last 7 days   Lab Units 09/10/19  1643 09/09/19  1544   TROPONIN I ng/mL <0 02 <0 02     Results from last 7 days   Lab Units 09/09/19  1544   D DIMER QUANT ng/ml (FEU) 1,164*         Results from last 7 days   Lab Units 09/09/19  1544   TSH 3RD GENERATON uIU/mL 3 477                     Results from last 7 days   Lab Units 09/09/19  1544   NT-PRO BNP pg/mL 340*           Results from last 7 days   Lab Units 09/10/19  1542   SPUTUM CULTURE  Test not performed  Suggest repeat specimen  GRAM STAIN RESULT  >10 squamous epithelial cells/lpf, indicating orophayngeal contamination  *  1+ Polys*  3+ Gram positive cocci in pairs, chains and clusters*  2+ Gram negative rods*  Rare Yeast*               Vital Signs:   09/11/19 0733  98 4 °F (36 9 °C)  74  20  139/73  97 %  None (Room air)         Medications:   Scheduled Meds:   Current Facility-Administered Medications:  acetaminophen 650 mg Oral Q6H PRN Taye Castellanos MD    amLODIPine 10 mg Oral Daily Logan Harada, CRNP    benzonatate 200 mg Oral TID Logan Harada, CRNP    cefTRIAXone 2,000 mg Intravenous Q24H Logan Harada, CRNP Last Rate: 2,000 mg (09/10/19 1816)   dextromethorphan-guaiFENesin 10 mL Oral Q4H PRN Logan Harada, CRNP    enoxaparin 40 mg Subcutaneous Daily Taye Castellanos MD    guaiFENesin 600 mg Oral Q12H Magi Antunez MD    hydrochlorothiazide 25 mg Oral Daily Jimmie Nolan PA-C    ipratropium-albuterol 3 mL Nebulization Q6H PRN Taye Castellanos MD    methocarbamol 500 mg Oral Q6H PRN Jimmie Nolan PA-C    methylPREDNISolone sodium succinate 40 mg Intravenous Q12H [address], [place]        Discharge Plan: D  Network Utilization Review Department  Phone: [phone]; Fax [phone]  [email or web address]  ATTENTION: Please call with any questions or concerns to [phone]  and carefully listen to the prompts so that you are directed to the right person  Send all requests for admission clinical reviews, approved or denied determinations and any other requests to fax [phone]   All voicemails are confidential

## 2019-09-11 NOTE — RESPIRATORY THERAPY NOTE
RT Protocol Note  Aleida Stoddard 75 y o  male MRN: [id number]  Unit/Bed#: -01 Encounter: [id number]    Assessment    Principal Problem:    Shortness of breath  Active Problems:    Essential hypertension    Chronic cough    Acute tracheobronchitis      Home Pulmonary Medications:  Albuterol HFA        Past Medical History:   Diagnosis Date    Hypertension      Social History     Socioeconomic History    Marital status: Single     Spouse name: None    Number of children: None    Years of education: None    Highest education level: None   Occupational History    None   Social Needs    Financial resource strain: None    Food insecurity:     Worry: None     Inability: None    Transportation needs:     Medical: None     Non-medical: None   Tobacco Use    Smoking status: Never Smoker   Substance and Sexual Activity    Alcohol use: Yes     Comment: occasional     Drug use: No    Sexual activity: None   Lifestyle    Physical activity:     Days per week: None     Minutes per session: None    Stress: None   Relationships    Social connections:     Talks on phone: None     Gets together: None     Attends [employer] service: None     Active member of club or organization: None     Attends meetings of clubs or organizations: None     Relationship status: None    Intimate partner violence:     Fear of current or ex partner: None     Emotionally abused: None     Physically abused: None     Forced sexual activity: None   Other Topics Concern    None   Social History Narrative    None       Subjective         Objective    Physical Exam:   Assessment Type: Assess only  General Appearance: Alert, Awake  Respiratory Pattern: Dyspnea with exertion, Dyspnea at rest  Chest Assessment: Chest expansion symmetrical  Bilateral Breath Sounds: Expiratory wheezes  Cough: Strong, Non-productive, Dry    Vitals:  Blood pressure 139/73, pulse 74, temperature 98 4 °F (36 9 °C), temperature source Oral, resp   rate 20, height 5' 9" (1 753 m), weight 115 kg (252 lb 10 4 oz), SpO2 98 %  Imaging and other studies: I have personally reviewed pertinent reports  Plan    Respiratory Plan: Mild Distress pathway        Resp Comments: Pt with persistent cough and SOB of unclear etiology  URI/tracheobronquitis vs cardia  Pt doesn not have a pulmonary history and doesnt smoke  Will switch bronchialator from PRN to TID due to SOB

## 2019-09-12 VITALS
SYSTOLIC BLOOD PRESSURE: 158 MMHG | RESPIRATION RATE: 16 BRPM | HEART RATE: 65 BPM | HEIGHT: 69 IN | OXYGEN SATURATION: 96 % | WEIGHT: 249.34 LBS | DIASTOLIC BLOOD PRESSURE: 75 MMHG | TEMPERATURE: 97.5 F | BODY MASS INDEX: 36.93 KG/M2

## 2019-09-12 LAB
ANION GAP SERPL CALCULATED.3IONS-SCNC: 8 MMOL/L (ref 4–13)
BUN SERPL-MCNC: 27 MG/DL (ref 5–25)
CALCIUM SERPL-MCNC: 9.2 MG/DL (ref 8.3–10.1)
CHEST PAIN STATEMENT: NORMAL
CHLORIDE SERPL-SCNC: 99 MMOL/L (ref 100–108)
CO2 SERPL-SCNC: 28 MMOL/L (ref 21–32)
CREAT SERPL-MCNC: 1.09 MG/DL (ref 0.6–1.3)
ERYTHROCYTE [DISTWIDTH] IN BLOOD BY AUTOMATED COUNT: 13.1 % (ref 11.6–15.1)
GFR SERPL CREATININE-BSD FRML MDRD: 70 ML/MIN/1.73SQ M
GLUCOSE SERPL-MCNC: 148 MG/DL (ref 65–140)
HCT VFR BLD AUTO: 39.7 % (ref 36.5–49.3)
HGB BLD-MCNC: 13 G/DL (ref 12–17)
MAX DIASTOLIC BP: 74 MMHG
MAX HEART RATE: 103 BPM
MAX PREDICTED HEART RATE: 154 BPM
MAX. SYSTOLIC BP: 156 MMHG
MCH RBC QN AUTO: 30.7 PG (ref 26.8–34.3)
MCHC RBC AUTO-ENTMCNC: 32.7 G/DL (ref 31.4–37.4)
MCV RBC AUTO: 94 FL (ref 82–98)
PLATELET # BLD AUTO: 226 THOUSANDS/UL (ref 149–390)
PMV BLD AUTO: 8.7 FL (ref 8.9–12.7)
POTASSIUM SERPL-SCNC: 4.4 MMOL/L (ref 3.5–5.3)
PROTOCOL NAME: NORMAL
RBC # BLD AUTO: 4.23 MILLION/UL (ref 3.88–5.62)
REASON FOR TERMINATION: NORMAL
SODIUM SERPL-SCNC: 135 MMOL/L (ref 136–145)
TARGET HR FORMULA: NORMAL
TEST INDICATION: NORMAL
TIME IN EXERCISE PHASE: NORMAL
WBC # BLD AUTO: 11.49 THOUSAND/UL (ref 4.31–10.16)

## 2019-09-12 PROCEDURE — 94760 N-INVAS EAR/PLS OXIMETRY 1: CPT

## 2019-09-12 PROCEDURE — 99238 HOSP IP/OBS DSCHRG MGMT 30/<: CPT | Performed by: INTERNAL MEDICINE

## 2019-09-12 PROCEDURE — 85027 COMPLETE CBC AUTOMATED: CPT | Performed by: NURSE PRACTITIONER

## 2019-09-12 PROCEDURE — 94640 AIRWAY INHALATION TREATMENT: CPT

## 2019-09-12 PROCEDURE — 80048 BASIC METABOLIC PNL TOTAL CA: CPT | Performed by: NURSE PRACTITIONER

## 2019-09-12 RX ORDER — PREDNISONE 10 MG/1
TABLET ORAL
Qty: 20 TABLET | Refills: 0 | Status: SHIPPED | OUTPATIENT
Start: 2019-09-12 | End: 2021-09-10 | Stop reason: ALTCHOICE

## 2019-09-12 RX ORDER — ALBUTEROL SULFATE 90 UG/1
1 AEROSOL, METERED RESPIRATORY (INHALATION) EVERY 4 HOURS PRN
Qty: 1 INHALER | Refills: 0 | Status: SHIPPED | OUTPATIENT
Start: 2019-09-12 | End: 2021-09-10 | Stop reason: ALTCHOICE

## 2019-09-12 RX ORDER — HYDROCHLOROTHIAZIDE 25 MG/1
25 TABLET ORAL DAILY
Qty: 30 TABLET | Refills: 0 | Status: SHIPPED | OUTPATIENT
Start: 2019-09-12 | End: 2021-09-10 | Stop reason: ALTCHOICE

## 2019-09-12 RX ORDER — LORATADINE 10 MG/1
10 TABLET ORAL DAILY
Qty: 30 TABLET | Refills: 0 | Status: SHIPPED | OUTPATIENT
Start: 2019-09-12 | End: 2021-09-10 | Stop reason: ALTCHOICE

## 2019-09-12 RX ORDER — METHOCARBAMOL 500 MG/1
500 TABLET, FILM COATED ORAL EVERY 6 HOURS PRN
Qty: 20 TABLET | Refills: 0 | Status: SHIPPED | OUTPATIENT
Start: 2019-09-12 | End: 2021-09-10 | Stop reason: ALTCHOICE

## 2019-09-12 RX ORDER — BENZONATATE 200 MG/1
200 CAPSULE ORAL 3 TIMES DAILY
Qty: 20 CAPSULE | Refills: 0 | Status: SHIPPED | OUTPATIENT
Start: 2019-09-12 | End: 2021-09-10 | Stop reason: ALTCHOICE

## 2019-09-12 RX ORDER — HYDROCODONE POLISTIREX AND CHLORPHENIRAMINE POLISTIREX 10; 8 MG/5ML; MG/5ML
5 SUSPENSION, EXTENDED RELEASE ORAL EVERY 12 HOURS PRN
Qty: 120 ML | Refills: 0 | Status: SHIPPED | OUTPATIENT
Start: 2019-09-12 | End: 2019-09-24

## 2019-09-12 RX ORDER — ALBUTEROL SULFATE 2.5 MG/3ML
2.5 SOLUTION RESPIRATORY (INHALATION) EVERY 4 HOURS PRN
Status: DISCONTINUED | OUTPATIENT
Start: 2019-09-12 | End: 2019-09-12 | Stop reason: HOSPADM

## 2019-09-12 RX ORDER — AMLODIPINE BESYLATE 10 MG/1
10 TABLET ORAL DAILY
Qty: 30 TABLET | Refills: 0 | Status: SHIPPED | OUTPATIENT
Start: 2019-09-12 | End: 2021-07-29 | Stop reason: SDUPTHER

## 2019-09-12 RX ADMIN — HYDROCHLOROTHIAZIDE 25 MG: 25 TABLET ORAL at 09:06

## 2019-09-12 RX ADMIN — AMLODIPINE BESYLATE 10 MG: 10 TABLET ORAL at 09:06

## 2019-09-12 RX ADMIN — ALBUTEROL SULFATE 1 PUFF: 90 AEROSOL, METERED RESPIRATORY (INHALATION) at 02:44

## 2019-09-12 RX ADMIN — LEVALBUTEROL 1.25 MG: 1.25 SOLUTION, CONCENTRATE RESPIRATORY (INHALATION) at 07:38

## 2019-09-12 RX ADMIN — LORATADINE 10 MG: 10 TABLET ORAL at 09:06

## 2019-09-12 RX ADMIN — GUAIFENESIN 1200 MG: 600 TABLET, EXTENDED RELEASE ORAL at 09:06

## 2019-09-12 RX ADMIN — IPRATROPIUM BROMIDE 0.5 MG: 0.5 SOLUTION RESPIRATORY (INHALATION) at 07:38

## 2019-09-12 RX ADMIN — METHYLPREDNISOLONE SODIUM SUCCINATE 20 MG: 40 INJECTION, POWDER, FOR SOLUTION INTRAMUSCULAR; INTRAVENOUS at 05:12

## 2019-09-12 RX ADMIN — METHOCARBAMOL TABLETS 500 MG: 500 TABLET, COATED ORAL at 02:49

## 2019-09-12 RX ADMIN — HYDROCODONE POLISTIREX AND CHLORPHENIRAMINE POLISTIREX 5 ML: 10; 8 SUSPENSION, EXTENDED RELEASE ORAL at 09:10

## 2019-09-12 RX ADMIN — BENZONATATE 200 MG: 100 CAPSULE ORAL at 09:06

## 2019-09-12 NOTE — PLAN OF CARE
Problem: RESPIRATORY - ADULT  Goal: Achieves optimal ventilation and oxygenation  Description  INTERVENTIONS:  - Assess for changes in respiratory status  - Assess for changes in mentation and behavior  - Position to facilitate oxygenation and minimize respiratory effort  - Oxygen administered by appropriate delivery if ordered  - Initiate smoking cessation education as indicated  - Encourage broncho-pulmonary hygiene including cough, deep breathe, Incentive Spirometry  - Assess the need for suctioning and aspirate as needed  - Assess and instruct to report SOB or any respiratory difficulty  - Respiratory Therapy support as indicated  Outcome: Progressing     Problem: PAIN - ADULT  Goal: Verbalizes/displays adequate comfort level or baseline comfort level  Description  Interventions:  - Encourage patient to monitor pain and request assistance  - Assess pain using appropriate pain scale  - Administer analgesics based on type and severity of pain and evaluate response  - Implement non-pharmacological measures as appropriate and evaluate response  - Consider cultural and social influences on pain and pain management  - Notify physician/advanced practitioner if interventions unsuccessful or patient reports new pain  Outcome: Progressing     Problem: DISCHARGE PLANNING  Goal: Discharge to home or other facility with appropriate resources  Description  INTERVENTIONS:  - Identify barriers to discharge w/patient and caregiver  - Arrange for needed discharge resources and transportation as appropriate  - Identify discharge learning needs (meds, wound care, etc )  - Arrange for interpretive services to assist at discharge as needed  - Refer to Case Management Department for coordinating discharge planning if the patient needs post-hospital services based on physician/advanced practitioner order or complex needs related to functional status, cognitive ability, or social support system  Outcome: Progressing     Problem: Knowledge Deficit  Goal: Patient/family/caregiver demonstrates understanding of disease process, treatment plan, medications, and discharge instructions  Description  Complete learning assessment and assess knowledge base  Interventions:  - Provide teaching at level of understanding  - Provide teaching via preferred learning methods  Outcome: Progressing     Problem: INFECTION - ADULT  Goal: Absence or prevention of progression during hospitalization  Description  INTERVENTIONS:  - Assess and monitor for signs and symptoms of infection  - Monitor lab/diagnostic results  - Monitor all insertion sites, i e  indwelling lines, tubes, and drains  - Monitor endotracheal if appropriate and nasal secretions for changes in amount and color  - [place] appropriate cooling/warming therapies per order  - Administer medications as ordered  - Instruct and encourage patient and family to use good hand hygiene technique  - Identify and instruct in appropriate isolation precautions for identified infection/condition  Outcome: Progressing  Goal: Absence of fever/infection during neutropenic period  Description  INTERVENTIONS:  - Monitor WBC    Outcome: Progressing     Problem: Potential for Falls  Goal: Patient will remain free of falls  Description  INTERVENTIONS:  - Assess patient frequently for physical needs  -  Identify cognitive and physical deficits and behaviors that affect risk of falls    -  [place] fall precautions as indicated by assessment   - Educate patient/family on patient safety including physical limitations  - Instruct patient to call for assistance with activity based on assessment  - Modify environment to reduce risk of injury  - Consider OT/PT consult to assist with strengthening/mobility  Outcome: Progressing     Problem: CARDIOVASCULAR - ADULT  Goal: Maintains optimal cardiac output and hemodynamic stability  Description  INTERVENTIONS:  - Monitor I/O, vital signs and rhythm  - Monitor for S/S and trends of decreased cardiac output  - Administer and titrate ordered vasoactive medications to optimize hemodynamic stability  - Assess quality of pulses, skin color and temperature  - Assess for signs of decreased coronary artery perfusion  - Instruct patient to report change in severity of symptoms  Outcome: Progressing

## 2019-09-12 NOTE — RESPIRATORY THERAPY NOTE
Resp  Care Note      09/12/19 0258   Inhalation Therapy Tx   $ Inhalation Therapy Performed Yes   $ Pulse Oximetry Spot Check Charge Completed   SpO2 98 %   Pre-Treatment Pulse 78   Pre-Treatment Respirations 26   Duration 10   Breath Sounds Pre-Treatment Bilateral Coarse; Other (Comment)   Post-Treatment Pulse 96   Post-Treatment Respsirations 91   Delivery Source UDN;Oxygen   Position High Echevarria's   Treatment Tolerance Tolerated well   Resp Comments Asked to report to pt's room for pt's dyspnea  Found pt rather tachypnic and at times violently coughing  PRN Albuterol inhaler utilized via spacer with appropriate breath hold (Albuterol)  Will modify Atrovent and Xopenex orders to increase to Q6 from current order for TID  RN notified and with his assessment, a dose of a muscle relaxant was given because pt's grabbing at his left side saying "it's either a broken rib or a hernia" was rather dramatic  NOTE:  RN and I discovered that this patient had been walking to his bathroom WITHOUT his nasal oxygen  30 ft of O2 extension tubing and some easy to deliver and well received patient education about the perils of walking on room air currently

## 2019-09-12 NOTE — DISCHARGE SUMMARY
Discharge- Recardo Reasoner 1955, 75 y o  male MRN: [id number]    Unit/Bed#: -01 Encounter: [id number]    Primary Care Provider: Kait Fierro DO   Date and time admitted to hospital: 9/9/2019  3:29 PM        * Dyspnea  Assessment & Plan  · Intractable cough secondary to ACE-inhibitor some improvement this morning  · This lady is improved  · Patient was apparently diagnosed with bronchitis on 8/16/19  He had follow-up with his PCP on 8/21/19 for Bronchitis, though was not getting better on z-pack, albuterol inhaler, and course of steroids  No prior hx of underlying lung disease  No smoking hx  Patient does not have history of CHF though concerning for possible cardiac component given chronicity and poor response to above interventions since beginning of August    · CXR unremarkable  · D-dimer elevated, though, CTA negative so do not suspect acute PE   · Pro-BNP mildly elevated at 304  · ECHO: LVEF 65%, wall thickness increased, concentric hypertrophy present, aortic valve with trace to mild regurgitation, ascending aorta was dilated up to 4 4 cm  · Nuclear stress test unremarkable  · Sputum culture abnormal but contaminated  · Overnight sleep screen showed patient desatted into the 60s for a total of 27 minutes    · Given Lasix 20 mg IV, Solumedrol 125 mg IV, and Alubterol nebulizer in ED on admission with minimal improvement in symptoms  · Continues with frequent coughing spells causing diffuse wheezing, lightheadedness, and dizziness  · No wheezing this morning  · Discontinue Lisinopril  · Changed to steroid taper  · Robaxin for muscle spasms  · P o   Cough suppressant  · Discharge patient home follow-up with PCP    Essential hypertension  Assessment & Plan  · /73  · Discontinue lisinopril due to lisinopril induced cough  · Continue newly increased Norvasc at 10 mg daily  · Continue hydrochlorothiazide  · Low-sodium diet  · Outpatient follow-up    Chronic cough  Assessment & Plan  Cough greater than 3 months  Started Lisinopril in April 2019  Likely side effect of lisinopril  · Discontinued Lisinopril on 9/10/2019   · Increased to Norvasc 10 mg daily  · Continue Mucinex BID, Tessalon Perles TID, Tussionex PRN,   · Steroid taper as above  · Outpatient follow-up      Discharging Physician / Practitioner: Sindy Palmer MD  PCP: Jennifer Call DO  Admission Date:   Admission Orders (From admission, onward)     Ordered        09/11/19 1604  Inpatient Admission  Once         09/09/19 1815  Place in Observation (expected length of stay for this patient is less than two midnights)  Once                   Discharge Date: 09/12/19    Resolved Problems  Date Reviewed: 9/12/2019    None          Consultations During Hospital Stay:  · Pulmonary    Procedures Performed:   · Nuclear medicine stress test negative for significant perfusion defects positive small diaphragmatic attenuation  · Echocardiogram ejection fraction 65% with no regional wall abnormalities concentric hypertrophy was present  · Chest x-ray no active disease  · CTA no evidence of pulmonary embolism  No infiltrate or pleural effusion  Positive ascending thoracic aortic dilatation    Significant Findings / Test Results:   · As above    Incidental Findings:   · None     Test Results Pending at Discharge (will require follow up): · None     Outpatient Tests Requested:  · None    Complications:  None    Reason for Admission:  Shortness of breath    Hospital Course:     Deloris Taylor is a 75 y o  male patient who originally presented to the hospital on 9/9/2019 due to shortness of breath  Patient has had a chronic cough  Presented hospital shortness of breath original concern was for heart failure versus asthma/COPD exacerbation he has an extensive workup as above  Felt possibly secondary to recent ACE-inhibitor institution  This medication was discontinued as above with change in medications    Patient has gradually improved but still with cough  Yesterday he was noted to be extremely diaphoretic with cough unable speak in full sentences with some evidence of expiratory wheezing  He was felt to be unstable to discharge to his clinical picture and and was pain is inpatient status  This morning he is improved plan is to discharge patient home on oral steroids as a quick taper follow up PCP        Please see above list of diagnoses and related plan for additional information  Condition at Discharge: fair     Discharge Day Visit / Exam:     Subjective:  I feel little better with a good night's sleep  Vitals: Blood Pressure: 158/75 (09/12/19 0700)  Pulse: 65 (09/12/19 0700)  Temperature: 97 5 °F (36 4 °C) (09/12/19 0700)  Temp Source: Axillary (09/12/19 0700)  Respirations: 16 (09/12/19 0700)  Height: 5' 9" (175 3 cm) (09/09/19 1946)  Weight - Scale: 113 kg (249 lb 5 4 oz) (09/12/19 0500)  SpO2: 96 % (09/12/19 0743)  Exam:   Physical Exam   Constitutional: He is oriented to person, place, and time  No distress  HENT:   Head: Normocephalic and atraumatic  Cardiovascular: Normal rate, regular rhythm and normal heart sounds  Exam reveals no gallop and no friction rub  No murmur heard  Pulmonary/Chest: Effort normal and breath sounds normal  No stridor  No respiratory distress  He has no wheezes  He has no rales  Abdominal: Soft  Bowel sounds are normal  He exhibits no distension and no mass  There is no tenderness  There is no rebound and no guarding  Musculoskeletal: He exhibits no edema  Neurological: He is alert and oriented to person, place, and time  Skin: Skin is warm and dry  He is not diaphoretic  Discussion with Family:  I offered to call patient's family prior to discharge patient refused    Discharge instructions/Information to patient and family:   See after visit summary for information provided to patient and family        Provisions for Follow-Up Care:  See after visit summary for information related to follow-up care and any pertinent home health orders  Disposition:     Home    For Discharges to [facility] SNF:   · Not Applicable to this Patient - Not Applicable to this Patient    Planned Readmission: no     Discharge Statement:  I spent 30 minutes discharging the patient  This time was spent on the day of discharge  I had direct contact with the patient on the day of discharge  Greater than 50% of the total time was spent examining patient, answering all patient questions, arranging and discussing plan of care with patient as well as directly providing post-discharge instructions  Additional time then spent on discharge activities  Discharge Medications:  See after visit summary for reconciled discharge medications provided to patient and family        ** Please Note: This note has been constructed using a voice recognition system **

## 2019-09-12 NOTE — DISCHARGE INSTRUCTIONS
Acute Cough   WHAT YOU NEED TO KNOW:   An acute cough can last up to 3 weeks  Common causes of an acute cough include a cold, allergies, or a lung infection  DISCHARGE INSTRUCTIONS:   Return to the emergency department if:   · You have trouble breathing or feel short of breath  · You cough up blood, or you see blood in your mucus  · You faint or feel weak or dizzy  · You have chest pain when you cough or take a deep breath  · You have new wheezing  Contact your healthcare provider if:   · You have a fever  · Your cough lasts longer than 4 weeks  · Your symptoms do not improve with treatment  · You have questions or concerns about your condition or care  Medicines:   · Medicines  may be needed to stop the cough, decrease swelling in your airways, or help open your airways  Medicine may also be given to help you cough up mucus  Ask your healthcare provider what over-the-counter medicines you can take  If you have an infection caused by bacteria, you may need antibiotics  · Take your medicine as directed  Contact your healthcare provider if you think your medicine is not helping or if you have side effects  Tell him or her if you are allergic to any medicine  Keep a list of the medicines, vitamins, and herbs you take  Include the amounts, and when and why you take them  Bring the list or the pill bottles to follow-up visits  Carry your medicine list with you in case of an emergency  Manage your symptoms:   · Do not smoke and stay away from others who smoke  Nicotine and other chemicals in cigarettes and cigars can cause lung damage and make your cough worse  Ask your healthcare provider for information if you currently smoke and need help to quit  E-cigarettes or smokeless tobacco still contain nicotine  Talk to your healthcare provider before you use these products  · Drink extra liquids as directed  Liquids will help thin and loosen mucus so you can cough it up   Liquids will also help prevent dehydration  Examples of good liquids to drink include water, fruit juice, and broth  Do not drink liquids that contain caffeine  Caffeine can increase your risk for dehydration  Ask your healthcare provider how much liquid to drink each day  · Rest as directed  Do not do activities that make your cough worse, such as exercise  · Use a humidifier or vaporizer  Use a cool mist humidifier or a vaporizer to increase air moisture in your home  This may make it easier for you to breathe and help decrease your cough  · Eat 2 to 5 mL of honey 2 times each day  Honey can help thin mucus and decrease your cough  · Use cough drops or lozenges  These can help decrease throat irritation and your cough  Follow up with your healthcare provider as directed:  Write down your questions so you remember to ask them during your visits  © 2017 [facility] Information is for End User's use only and may not be sold, redistributed or otherwise used for commercial purposes  All illustrations and images included in CareNotes® are the copyrighted property of A D A M , Inc  or [employer]  The above information is an  only  It is not intended as medical advice for individual conditions or treatments  Talk to your doctor, nurse or pharmacist before following any medical regimen to see if it is safe and effective for you

## 2019-09-12 NOTE — PLAN OF CARE
Problem: RESPIRATORY - ADULT  Goal: Achieves optimal ventilation and oxygenation  Description  INTERVENTIONS:  - Assess for changes in respiratory status  - Assess for changes in mentation and behavior  - Position to facilitate oxygenation and minimize respiratory effort  - Oxygen administered by appropriate delivery if ordered  - Initiate smoking cessation education as indicated  - Encourage broncho-pulmonary hygiene including cough, deep breathe, Incentive Spirometry  - Assess the need for suctioning and aspirate as needed  - Assess and instruct to report SOB or any respiratory difficulty  - Respiratory Therapy support as indicated  Outcome: Progressing     Problem: PAIN - ADULT  Goal: Verbalizes/displays adequate comfort level or baseline comfort level  Description  Interventions:  - Encourage patient to monitor pain and request assistance  - Assess pain using appropriate pain scale  - Administer analgesics based on type and severity of pain and evaluate response  - Implement non-pharmacological measures as appropriate and evaluate response  - Consider cultural and social influences on pain and pain management  - Notify physician/advanced practitioner if interventions unsuccessful or patient reports new pain  Outcome: Progressing     Problem: DISCHARGE PLANNING  Goal: Discharge to home or other facility with appropriate resources  Description  INTERVENTIONS:  - Identify barriers to discharge w/patient and caregiver  - Arrange for needed discharge resources and transportation as appropriate  - Identify discharge learning needs (meds, wound care, etc )  - Arrange for interpretive services to assist at discharge as needed  - Refer to Case Management Department for coordinating discharge planning if the patient needs post-hospital services based on physician/advanced practitioner order or complex needs related to functional status, cognitive ability, or social support system  Outcome: Progressing     Problem: Knowledge Deficit  Goal: Patient/family/caregiver demonstrates understanding of disease process, treatment plan, medications, and discharge instructions  Description  Complete learning assessment and assess knowledge base  Interventions:  - Provide teaching at level of understanding  - Provide teaching via preferred learning methods  Outcome: Progressing     Problem: INFECTION - ADULT  Goal: Absence or prevention of progression during hospitalization  Description  INTERVENTIONS:  - Assess and monitor for signs and symptoms of infection  - Monitor lab/diagnostic results  - Monitor all insertion sites, i e  indwelling lines, tubes, and drains  - Monitor endotracheal if appropriate and nasal secretions for changes in amount and color  - [place] appropriate cooling/warming therapies per order  - Administer medications as ordered  - Instruct and encourage patient and family to use good hand hygiene technique  - Identify and instruct in appropriate isolation precautions for identified infection/condition  Outcome: Progressing  Goal: Absence of fever/infection during neutropenic period  Description  INTERVENTIONS:  - Monitor WBC    Outcome: Progressing     Problem: Potential for Falls  Goal: Patient will remain free of falls  Description  INTERVENTIONS:  - Assess patient frequently for physical needs  -  Identify cognitive and physical deficits and behaviors that affect risk of falls    -  [place] fall precautions as indicated by assessment   - Educate patient/family on patient safety including physical limitations  - Instruct patient to call for assistance with activity based on assessment  - Modify environment to reduce risk of injury  - Consider OT/PT consult to assist with strengthening/mobility  Outcome: Progressing     Problem: CARDIOVASCULAR - ADULT  Goal: Maintains optimal cardiac output and hemodynamic stability  Description  INTERVENTIONS:  - Monitor I/O, vital signs and rhythm  - Monitor for S/S and trends of decreased cardiac output  - Administer and titrate ordered vasoactive medications to optimize hemodynamic stability  - Assess quality of pulses, skin color and temperature  - Assess for signs of decreased coronary artery perfusion  - Instruct patient to report change in severity of symptoms  Outcome: Progressing

## 2019-09-12 NOTE — INCIDENTAL FINDINGS
The following findings require follow up:  Radiographic finding   Finding:  Ascending thoracic aortic dilatation   Follow up required:  Thoracic surgery evaluation   Follow up should be done within 1 month(s)    Please notify the following clinician to assist with the follow up:   Dr Susie Douglas

## 2019-09-13 LAB — HCV RNA SERPL QL NAA+PROBE: NEGATIVE

## 2019-09-17 NOTE — UTILIZATION REVIEW
Continued Stay Review    Date: 9/17/2019                              HPI:64 y o  male initially admitted on 9/9/2019    Assessment/Plan:   Current Patient Class: Inpatient                   Current Level of Care: OBS 9/9 ; INPATIENT 9/11/2019 1604 med surg  OBSERVATION 9/9/2019 1815 CONVERTED TO INPATIENT ADMISSION 9/11/2019 1604 DUE TO INTRACTABLE COUGHING WITH DYSPNEA WITH ACCESSORY MUSCLE USE, TACHYPNEA AND DECREASED BREATH SOUNDS    09/11/19 1604  Inpatient Admission Once     Transfer Service: General Medicine    Expected Discharge Date: 09/13/19       Question Answer Comment   Admitting Physician Ruddy Garcia     Level of Care Med Surg     Estimated length of stay More than 2 Midnights     Certification I certify that inpatient services are medically necessary for this patient for a duration of greater than two midnights  See H&P and MD Progress Notes for additional information about the patient's course of treatment          09/11/19 1604       · From 9/10-9/11/2019 Overnight sleep screen showed patient desatted into the 60s for a total of 27 minutes    · Given Lasix 20 mg IV, Solumedrol 125 mg IV, and Alubterol nebulizer in ED on admission with minimal improvement in symptoms    Dyspnea  Assessment & Plan  · Intractable cough secondary to ACE-inhibitor some improvement this morning  · This lady is improved  · Patient was apparently diagnosed with bronchitis on 8/16/19  He had follow-up with his PCP on 8/21/19 for Bronchitis, though was not getting better on z-pack, albuterol inhaler, and course of steroids  No prior hx of underlying lung disease  No smoking hx    Patient does not have history of CHF though concerning for possible cardiac component given chronicity and poor response to above interventions since beginning of August    · CXR unremarkable  · D-dimer elevated, though, CTA negative so do not suspect acute PE   · Pro-BNP mildly elevated at 304  · ECHO: LVEF 65%, wall thickness increased, concentric hypertrophy present, aortic valve with trace to mild regurgitation, ascending aorta was dilated up to 4 4 cm  · Nuclear stress test unremarkable  · Sputum culture abnormal but contaminated  · Overnight sleep screen showed patient desatted into the 60s for a total of 27 minutes     · Given Lasix 20 mg IV, Solumedrol 125 mg IV, and Alubterol nebulizer in ED on admission with minimal improvement in symptoms  · Continues with frequent coughing spells causing diffuse wheezing, lightheadedness, and dizziness  · No wheezing this morning  · Discontinue Lisinopril  · Changed to steroid taper  · Robaxin for muscle spasms  · P o  Cough suppressant  · Discharge patient home follow-up with PCP  Essential hypertension  Assessment & Plan  · /73  · Discontinue lisinopril due to lisinopril induced cough  · Continue newly increased Norvasc at 10 mg daily  · Continue hydrochlorothiazide  · Low-sodium diet  · Outpatient follow-up  Chronic cough  Assessment & Plan  Cough greater than 3 months  Started Lisinopril in April 2019  Likely side effect of lisinopril  · Discontinued Lisinopril on 9/10/2019   · Increased to Norvasc 10 mg daily  · Continue Mucinex BID, Tessalon Perles TID, Tussionex PRN,   · Steroid taper as above  · Outpatient follow-up  ·   Pertinent Labs/Diagnostic Results:   Procedures Performed:   · Nuclear medicine stress test negative for significant perfusion defects positive small diaphragmatic attenuation  · Echocardiogram ejection fraction 65% with no regional wall abnormalities concentric hypertrophy was present  · Chest x-ray no active disease  · CTA no evidence of pulmonary embolism  No infiltrate or pleural effusion    Positive ascending thoracic aortic dilatation  Results from last 7 days   Lab Units 09/12/19  0522 09/11/19  0606   WBC Thousand/uL 11 49* 12 60*   HEMOGLOBIN g/dL 13 0 13 5   HEMATOCRIT % 39 7 40 6   PLATELETS Thousands/uL 226 217         Results from last 7 days   Lab Units 09/12/19  0522 09/11/19  0606   SODIUM mmol/L 135* 137   POTASSIUM mmol/L 4 4 4 5   CHLORIDE mmol/L 99* 101   CO2 mmol/L 28 26   ANION GAP mmol/L 8 10   BUN mg/dL 27* 28*   CREATININE mg/dL 1 09 1 07   EGFR ml/min/1 73sq m 70 72   CALCIUM mg/dL 9 2 9 2             Results from last 7 days   Lab Units 09/12/19  0522 09/11/19  0606   GLUCOSE RANDOM mg/dL 148* 135                                Results from last 7 days   Lab Units 09/10/19  1643   TROPONIN I ng/mL <0 02                 Results from last 7 days   Lab Units 09/11/19  0607   PROCALCITONIN ng/ml <0 05           Results from last 7 days   Lab Units 09/10/19  1542   SPUTUM CULTURE  Test not performed  Suggest repeat specimen  GRAM STAIN RESULT  >10 squamous epithelial cells/lpf, indicating orophayngeal contamination  *  1+ Polys*  3+ Gram positive cocci in pairs, chains and clusters*  2+ Gram negative rods*  Rare Yeast*               Vital Signs: placed on 2 L fiO2 sat=97 % 9/12 0300 then  room air 0743 9/12 sat =96    Medications:   Scheduled Meds:   amLODIPine (NORVASC) tablet 10 mg   Dose: 10 mg  Freq: Daily Route: PO  Start: 09/11/19 0900 End: 09/12/19 1336    Admin Instructions:     benzonatate (TESSALON PERLES) capsule 200 mg   Dose: 200 mg  Freq: 3 times daily Route: PO  Start: 09/10/19 0930 End: 09/12/19 1336  cefTRIAXone (ROCEPHIN) 2,000 mg in dextrose 5 % 50 mL IVPB   Dose: 2,000 mg  Freq: Every 24 hours Route: IV  Last Dose: Stopped (09/12/19 1129)  Start: 09/10/19 1549 End: 09/11/19 1616    furosemide (LASIX) injection 20 mg   Dose: 20 mg  Freq: Once Route: IV  Start: 09/11/19 1445 End: 09/11/19 1531-x1 9/11    furosemide (LASIX) injection 20 mg   Dose: 20 mg  Freq:  Once Route: IV  Start: 09/09/19 2000 End: 09/09/19 [id number] 9/9    guaiFENesin (MUCINEX) 12 hr tablet 1,200 mg   Dose: 1,200 mg  Freq: Every 12 hours scheduled Route: PO  Start: 09/11/19 2100 End: 09/12/19 1336-x1 9/11&9/12    guaiFENesin (MUCINEX) 12 hr tablet 600 mg   Dose: 600 mg  Freq: Every 12 hours scheduled Route: PO  Start: 09/09/19 2100 End: 09/11/19 1444    hydrochlorothiazide (HYDRODIURIL) tablet 25 mg   Dose: 25 mg  Freq: Daily Route: PO  Start: 09/10/19 0900 End: 09/12/19 1336    ipratropium (ATROVENT) 0 02 % inhalation solution 0 5 mg   Dose: 0 5 mg  Freq: 3 times daily (RESP) Route: NEBULIZATION  Start: 09/11/19 1400 End: 09/12/19 1336  methylPREDNISolone sodium succinate (Solu-MEDROL) injection 20 mg   Dose: 20 mg  Freq: Every 8 hours scheduled Route: IV  methylPREDNISolone sodium succinate (Solu-MEDROL) injection 40 mg   Dose: 40 mg  Freq: Every 12 hours scheduled Route: IV  Start: 09/10/19 1545 End: 09/11/19 0859    Discharge Plan: dc 9/12     Network Utilization Review Department  Phone: [phone]; Fax [phone]  [email or web address]  ATTENTION: Please call with any questions or concerns to [phone]  and carefully listen to the prompts so that you are directed to the right person  Send all requests for admission clinical reviews, approved or denied determinations and any other requests to fax [phone]   All voicemails are confidential

## 2021-07-27 ENCOUNTER — EVALUATION (OUTPATIENT)
Dept: PHYSICAL THERAPY | Facility: CLINIC | Age: 66
End: 2021-07-27
Payer: COMMERCIAL

## 2021-07-27 ENCOUNTER — OFFICE VISIT (OUTPATIENT)
Dept: OBGYN CLINIC | Facility: CLINIC | Age: 66
End: 2021-07-27
Payer: COMMERCIAL

## 2021-07-27 ENCOUNTER — APPOINTMENT (OUTPATIENT)
Dept: RADIOLOGY | Facility: CLINIC | Age: 66
End: 2021-07-27
Payer: COMMERCIAL

## 2021-07-27 VITALS
HEIGHT: 70 IN | HEART RATE: 59 BPM | WEIGHT: 205 LBS | DIASTOLIC BLOOD PRESSURE: 95 MMHG | SYSTOLIC BLOOD PRESSURE: 155 MMHG | BODY MASS INDEX: 29.35 KG/M2

## 2021-07-27 VITALS — DIASTOLIC BLOOD PRESSURE: 90 MMHG | SYSTOLIC BLOOD PRESSURE: 150 MMHG

## 2021-07-27 DIAGNOSIS — M89.8X1 PAIN OF LEFT CLAVICLE: ICD-10-CM

## 2021-07-27 DIAGNOSIS — S06.0X9A CONCUSSION WITH LOSS OF CONSCIOUSNESS, INITIAL ENCOUNTER: Primary | ICD-10-CM

## 2021-07-27 DIAGNOSIS — F07.81 POST CONCUSSION SYNDROME: ICD-10-CM

## 2021-07-27 DIAGNOSIS — S42.022G CLOSED DISPLACED FRACTURE OF SHAFT OF LEFT CLAVICLE WITH DELAYED HEALING, SUBSEQUENT ENCOUNTER: Primary | ICD-10-CM

## 2021-07-27 DIAGNOSIS — R26.89 BALANCE PROBLEM: ICD-10-CM

## 2021-07-27 PROCEDURE — 73000 X-RAY EXAM OF COLLAR BONE: CPT

## 2021-07-27 PROCEDURE — 99204 OFFICE O/P NEW MOD 45 MIN: CPT | Performed by: ORTHOPAEDIC SURGERY

## 2021-07-27 PROCEDURE — 97162 PT EVAL MOD COMPLEX 30 MIN: CPT | Performed by: PHYSICAL THERAPIST

## 2021-07-27 RX ORDER — LOSARTAN POTASSIUM 25 MG/1
25 TABLET ORAL DAILY
COMMUNITY
End: 2021-09-10 | Stop reason: ALTCHOICE

## 2021-07-27 RX ORDER — FERROUS SULFATE 325(65) MG
325 TABLET ORAL 2 TIMES DAILY
COMMUNITY
Start: 2021-06-05 | End: 2022-07-27

## 2021-07-27 RX ORDER — MULTIVITAMIN
1 CAPSULE ORAL DAILY
COMMUNITY
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
## 2021-07-27 NOTE — PROGRESS NOTES
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, subsequent encounter  XR clavicle left   2  Post concussion syndrome  Ambulatory referral to Physical Therapy   3  Balance problem  Ambulatory referral to Physical Therapy       ***    Subjective:   Reji Kruse is a 74 y o  male who presents 5/24      Review of Systems      Past Medical History:   Diagnosis Date    Hypertension        Past Surgical History:   Procedure Laterality Date    HERNIA REPAIR      ROTATOR CUFF REPAIR      SPLENECTOMY, PARTIAL         History reviewed  No pertinent family history      Social History     Occupational History    Not on file   Tobacco Use    Smoking status: Never Smoker    Smokeless tobacco: Never Used   Vaping Use    Vaping Use: Never used   Substance and Sexual Activity    Alcohol use: Yes     Comment: occasional     Drug use: No    Sexual activity: Not on file         Current Outpatient Medications:     albuterol (PROVENTIL HFA,VENTOLIN HFA) 90 mcg/act inhaler, Inhale 1 puff every 4 (four) hours as needed for wheezing or shortness of breath, Disp: 1 Inhaler, Rfl: 0    ferrous sulfate 325 (65 Fe) mg tablet, Take 325 mg by mouth 2 (two) times a day, Disp: , Rfl:     losartan (COZAAR) 25 mg tablet, Take 25 mg by mouth daily, Disp: , Rfl:     Multiple Vitamin (multivitamin) capsule, Take 1 capsule by mouth daily, Disp: , Rfl:     amLODIPine (NORVASC) 10 mg tablet, Take 1 tablet (10 mg total) by mouth daily (Patient not taking: Reported on 7/27/2021), Disp: 30 tablet, Rfl: 0    benzonatate (TESSALON) 200 MG capsule, Take 1 capsule (200 mg total) by mouth 3 (three) times a day (Patient not taking: Reported on 7/27/2021), Disp: 20 capsule, Rfl: 0    hydrochlorothiazide (HYDRODIURIL) 25 mg tablet, Take 1 tablet (25 mg total) by mouth daily (Patient not taking: Reported on 7/27/2021), Disp: 30 tablet, Rfl: 0    loratadine (CLARITIN) 10 mg tablet, Take 1 tablet (10 mg total) by mouth daily (Patient not taking: Reported on 7/27/2021), Disp: 30 tablet, Rfl: 0    methocarbamol (ROBAXIN) 500 mg tablet, Take 1 tablet (500 mg total) by mouth every 6 (six) hours as needed for muscle spasms (Patient not taking: Reported on 7/27/2021), Disp: 20 tablet, Rfl: 0    prednisoLONE acetate (PRED FORTE) 1 % ophthalmic suspension, Administer 1 drop to both eyes 4 (four) times a day   (Patient not taking: Reported on 9/9/2019), Disp: 5 mL, Rfl: 0    predniSONE 10 mg tablet, 4 pills daily for 2 days then 3 pills daily for 2 days then 2 pills daily for 2 days then  1 pill daily for 2 days then stop (Patient not taking: Reported on 7/27/2021), Disp: 20 tablet, Rfl: 0    No Known Allergies    Objective:  Vitals:    07/27/21 1431   BP: 155/95   Pulse: 59       Ortho Exam    Physical Exam    I have personally reviewed pertinent films in PACS and my interpretation is as follows:  ***
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
## 2021-07-29 ENCOUNTER — CONSULT (OUTPATIENT)
Dept: NEUROLOGY | Facility: CLINIC | Age: 66
End: 2021-07-29
Payer: COMMERCIAL

## 2021-07-29 ENCOUNTER — EVALUATION (OUTPATIENT)
Dept: OCCUPATIONAL THERAPY | Facility: CLINIC | Age: 66
End: 2021-07-29
Payer: COMMERCIAL

## 2021-07-29 VITALS
DIASTOLIC BLOOD PRESSURE: 116 MMHG | BODY MASS INDEX: 29.35 KG/M2 | HEART RATE: 60 BPM | WEIGHT: 205 LBS | HEIGHT: 70 IN | SYSTOLIC BLOOD PRESSURE: 160 MMHG

## 2021-07-29 DIAGNOSIS — G44.40 ANALGESIC REBOUND HEADACHE: ICD-10-CM

## 2021-07-29 DIAGNOSIS — Z91.81 HISTORY OF FALL: Primary | ICD-10-CM

## 2021-07-29 DIAGNOSIS — T39.95XA ANALGESIC REBOUND HEADACHE: ICD-10-CM

## 2021-07-29 DIAGNOSIS — R26.89 BALANCE PROBLEM: ICD-10-CM

## 2021-07-29 DIAGNOSIS — F07.81 POST CONCUSSION SYNDROME: ICD-10-CM

## 2021-07-29 DIAGNOSIS — S06.0X9D CONCUSSION WITH LOSS OF CONSCIOUSNESS, SUBSEQUENT ENCOUNTER: Primary | ICD-10-CM

## 2021-07-29 DIAGNOSIS — M43.6 NECK STIFFNESS: ICD-10-CM

## 2021-07-29 DIAGNOSIS — M54.2 NECK PAIN: ICD-10-CM

## 2021-07-29 DIAGNOSIS — I10 ESSENTIAL HYPERTENSION: ICD-10-CM

## 2021-07-29 DIAGNOSIS — R51.9 CHRONIC DAILY HEADACHE: ICD-10-CM

## 2021-07-29 PROCEDURE — 97167 OT EVAL HIGH COMPLEX 60 MIN: CPT

## 2021-07-29 PROCEDURE — 99244 OFF/OP CNSLTJ NEW/EST MOD 40: CPT | Performed by: PSYCHIATRY & NEUROLOGY

## 2021-07-29 RX ORDER — VERAPAMIL HYDROCHLORIDE 120 MG/1
120 CAPSULE, EXTENDED RELEASE ORAL
Qty: 30 CAPSULE | Refills: 2 | Status: SHIPPED | OUTPATIENT
Start: 2021-07-29 | End: 2021-09-26 | Stop reason: SDUPTHER

## 2021-07-29 RX ORDER — LOSARTAN POTASSIUM AND HYDROCHLOROTHIAZIDE 12.5; 5 MG/1; MG/1
1 TABLET ORAL DAILY
COMMUNITY
Start: 2021-04-19 | End: 2022-07-27
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
## 2021-08-02 ENCOUNTER — OFFICE VISIT (OUTPATIENT)
Dept: OCCUPATIONAL THERAPY | Facility: CLINIC | Age: 66
End: 2021-08-02
Payer: COMMERCIAL

## 2021-08-02 ENCOUNTER — OFFICE VISIT (OUTPATIENT)
Dept: PHYSICAL THERAPY | Facility: CLINIC | Age: 66
End: 2021-08-02
Payer: COMMERCIAL

## 2021-08-02 DIAGNOSIS — R26.89 IMBALANCE: ICD-10-CM

## 2021-08-02 DIAGNOSIS — M54.2 CERVICALGIA: ICD-10-CM

## 2021-08-02 DIAGNOSIS — S06.0X9D CONCUSSION WITH LOSS OF CONSCIOUSNESS, SUBSEQUENT ENCOUNTER: Primary | ICD-10-CM

## 2021-08-02 DIAGNOSIS — G44.309 HEADACHE AS LATE EFFECT OF BRAIN INJURY (HCC): ICD-10-CM

## 2021-08-02 DIAGNOSIS — R42 DIZZINESS: ICD-10-CM

## 2021-08-02 DIAGNOSIS — S06.0X9A CONCUSSION WITH LOSS OF CONSCIOUSNESS, INITIAL ENCOUNTER: Primary | ICD-10-CM

## 2021-08-02 DIAGNOSIS — S06.9X0S HEADACHE AS LATE EFFECT OF BRAIN INJURY (HCC): ICD-10-CM

## 2021-08-02 PROCEDURE — 97010 HOT OR COLD PACKS THERAPY: CPT

## 2021-08-02 PROCEDURE — 97110 THERAPEUTIC EXERCISES: CPT

## 2021-08-02 PROCEDURE — 97140 MANUAL THERAPY 1/> REGIONS: CPT

## 2021-08-02 PROCEDURE — 97530 THERAPEUTIC ACTIVITIES: CPT

## 2021-08-02 NOTE — PROGRESS NOTES
Daily Note     Today's date: 2021  Patient name: Deloris Taylor  : 1955  MRN: [id number]  Referring provider: No ref  provider found  Dx:   Encounter Diagnosis     ICD-10-CM    1  Concussion with loss of consciousness, subsequent encounter  S06  0X9D                   Subjective: "its there"   Reports eye fatigue with saccades worksheet    Objective    HA 2/10 at beginning  after activity 2/10   performed line pursuits focusing on pursuits with highlighting focusingon pusuits,  skills required min VCs for problem solving,   performed horizontal saccades task of decoidng first letter of sentence   pt reports blurry vision during session and wiht items further out decreased blurry vision- demonstrating difficulty with convergence  performed vertical saccades with decoding last letter of sentence with simple vertical head turns focusing on accomodation far to near, saccades, and sustianed attention  will complete line pursuits at home for HEP         Assessment: Tolerated treatment well  Patient would benefit from continued OT  Pt demonstrating decreased convergence with blurry vision however was able to tolerate without an increase in headache and eye faitgue post     Plan: Continue per plan of care

## 2021-08-02 NOTE — PROGRESS NOTES
Daily Note     Today's date: 2021  Patient name: Reji Kruse  : 1955  MRN: [id number]  Referring provider: Jeannine Mcleod DO  Dx:   Encounter Diagnosis     ICD-10-CM    1  Concussion with loss of consciousness, initial encounter  S06  0X9A                   Subjective: Denies falls  Current headache 4-5/10, denies dizziness, but reports the room is "foggy"  Denies cervical pain currently  Seen at request of facility director  Objective: See treatment diary below  PT verbal and tactile cues for technique and progression  Visual screen as below:  - Baseline Symptoms: -5/10  - Gaze Holding Nystagmus: WNL  - Spontaneous Nystagmus Room Light: WNL  - Smooth Pursuits (central): WNL  - Near Point Convergence (central):15 cm  - Saccades (central): WNL  - VOR Cancel (central): WNL  - Head Thrust (moderate to severe): WNL  - Dynamic Visual Acuity (2 Hz): not performed due to cervical pain                Balance:  5xSTS: 24 62 seconds with UE assist  TU 97 seconds with UE assist  (+) Romberg    There-Ex  C-spine AROM 15 reps all directions  Shoulder circles 10 x CW/CCW  Chin tuck 10"x10  Manual PT- SOR x 3 min, C-spine gentle traction 15"x10, denies HA after manual treatment  CP to C-spine x 10 minutes- continuing into initial OT session  NOT TODAY- recumbent bike    NMR (NOT TODAY)  A-P sway  FTHT  FTEC  Dynamic Gait Drills    Assessment: Tolerated treatment well  Primary concussion symptoms appear combination of headache, motor/ imbalance with some visual impairment  High fall risk on standardized balance testing  Some deficit with vergence- patient reports not wearing glasses he normally would wear full time since the incident  Recommend patient wear glasses as prescribed  Recommend attempt DGI next session  Headache 0/10, pain 0/10 post session  Responded well to there ex/ manual treatment  Patient demonstrated fatigue post treatment   Recommend continue PT to address impairment and return to normal function      Plan: Continue per plan of care

## 2021-08-05 ENCOUNTER — OFFICE VISIT (OUTPATIENT)
Dept: OCCUPATIONAL THERAPY | Facility: CLINIC | Age: 66
End: 2021-08-05
Payer: COMMERCIAL

## 2021-08-05 ENCOUNTER — OFFICE VISIT (OUTPATIENT)
Dept: PHYSICAL THERAPY | Facility: CLINIC | Age: 66
End: 2021-08-05
Payer: COMMERCIAL

## 2021-08-05 DIAGNOSIS — S06.0X9D CONCUSSION WITH LOSS OF CONSCIOUSNESS, SUBSEQUENT ENCOUNTER: Primary | ICD-10-CM

## 2021-08-05 DIAGNOSIS — G44.309 HEADACHE AS LATE EFFECT OF BRAIN INJURY (HCC): ICD-10-CM

## 2021-08-05 DIAGNOSIS — M54.2 CERVICALGIA: ICD-10-CM

## 2021-08-05 DIAGNOSIS — R42 DIZZINESS: ICD-10-CM

## 2021-08-05 DIAGNOSIS — S06.9X0S HEADACHE AS LATE EFFECT OF BRAIN INJURY (HCC): ICD-10-CM

## 2021-08-05 DIAGNOSIS — R26.89 IMBALANCE: ICD-10-CM

## 2021-08-05 DIAGNOSIS — S06.0X9A CONCUSSION WITH LOSS OF CONSCIOUSNESS, INITIAL ENCOUNTER: Primary | ICD-10-CM

## 2021-08-05 PROCEDURE — 97110 THERAPEUTIC EXERCISES: CPT

## 2021-08-05 PROCEDURE — 97112 NEUROMUSCULAR REEDUCATION: CPT

## 2021-08-05 PROCEDURE — 97530 THERAPEUTIC ACTIVITIES: CPT

## 2021-08-05 NOTE — PROGRESS NOTES
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 ([facility] [address] )  G44 309     S06 9X0S    3  Dizziness  R42    4  Cervicalgia  M54 2    5  Imbalance  R26 89                   Subjective: Patient reports no new changes, complaints, or falls  He noted HA is 2/10 and dizziness is 3-4/10 upon arrival       Objective: See treatment diary below  PT verbal and tactile cues for technique and progression  TE:  - UT Stretch: 3 reps, 30 sec B/L  - LV Stretch: 3 reps, 30 sec B/L  - Chin tucks w/ cervical extension: 2 sets, 10 reps    NMR:  VORx1 (30 sec, self paced - keeping in focus, 2 sets)  - H: 5/10 dizziness (5/10 dizziness)  - V: 5/10 dizziness  (5/10 dizziness)    - FTEC (firm): 1 reps, 30 sec  - FAEC (foam): 4 reps, 30 sec       Assessment: Patient able to tolerate treatment session well today  Increased dizziness and postural sway with VOR exercises especially with vertical head movements as compared to horizontal head movements  Educated the patient to wear his glasses throughout the session as he wears them on a regular basis and he was in good verbal understanding and plans to schedule an appointment with his eye doctor in a couple of weeks  He required increased duration rest break between vestibular exercises to allow symptom reduction to baseline  He will continue to benefit from skilled outpatient PT on order to maximize his function and reduce his symptoms  Plan: Continue per plan of care       Outcome Measures Initial Eval          mCTSIB  - FTEO (firm)  - FTEC (firm)  - FTEO (foam)  - FTEC (foam)    sec   sec   sec   sec        DGI /24        FGA /30        GAYLE  Errors        DHI /100        PSSS /22  /132        5xSTS  24 62 sec w/ 2 UE       TUG  20 97 sec w/ 2 UE

## 2021-08-05 NOTE — PROGRESS NOTES
Daily Note     Today's date: 2021  Patient name: Rachell Grayson  : 1955  MRN: [id number]  Referring provider: Austyn Guthrie, DO  Dx:   Encounter Diagnosis     ICD-10-CM    1  Concussion with loss of consciousness, subsequent encounter  S06  0X9D        Start Time:   Stop Time: 1100  Total time in clinic (min): 45 minutes    Subjective: Pt reports he has been taking advil for headaches after being told to stop taking Excedrin by doctor  Pt reports improvement in walking straight on grass  Reports decreased dizziness and headache when focus on a task at home  Objective    HA 0/10 and dizziness 3/10 at beginning    Completed symbols scanning worksheet on slant board to work on visual pursuits and convergence  Completed with accuracy 3/6 after initial attempt   After: 2-3/10 HA and 2-3/10 dizziness    Completed sentence  worksheet on slant board to work on visual pursuits, visual perception, and convergence  Completed word  worksheet on slant board to work on visual pursuits, saccades, visual perception, and convergence  Reports slight difficulty with task  Recommended as HEP  Assessment: Tolerated treatment well  Patient would benefit from continued OT  Pt demonstrating increased symptoms with visual pursuits with busy background  Plan: Continue per plan of care

## 2021-08-09 ENCOUNTER — OFFICE VISIT (OUTPATIENT)
Dept: OCCUPATIONAL THERAPY | Facility: CLINIC | Age: 66
End: 2021-08-09
Payer: COMMERCIAL

## 2021-08-09 ENCOUNTER — OFFICE VISIT (OUTPATIENT)
Dept: PHYSICAL THERAPY | Facility: CLINIC | Age: 66
End: 2021-08-09
Payer: COMMERCIAL

## 2021-08-09 DIAGNOSIS — S06.0X9A CONCUSSION WITH LOSS OF CONSCIOUSNESS, INITIAL ENCOUNTER: Primary | ICD-10-CM

## 2021-08-09 DIAGNOSIS — R26.89 IMBALANCE: ICD-10-CM

## 2021-08-09 DIAGNOSIS — G44.309 HEADACHE AS LATE EFFECT OF BRAIN INJURY (HCC): ICD-10-CM

## 2021-08-09 DIAGNOSIS — S06.9X0S HEADACHE AS LATE EFFECT OF BRAIN INJURY (HCC): ICD-10-CM

## 2021-08-09 DIAGNOSIS — S06.0X9D CONCUSSION WITH LOSS OF CONSCIOUSNESS, SUBSEQUENT ENCOUNTER: Primary | ICD-10-CM

## 2021-08-09 DIAGNOSIS — R42 DIZZINESS: ICD-10-CM

## 2021-08-09 DIAGNOSIS — M54.2 CERVICALGIA: ICD-10-CM

## 2021-08-09 PROCEDURE — 97530 THERAPEUTIC ACTIVITIES: CPT

## 2021-08-09 PROCEDURE — 97112 NEUROMUSCULAR REEDUCATION: CPT

## 2021-08-09 PROCEDURE — 97110 THERAPEUTIC EXERCISES: CPT

## 2021-08-09 NOTE — PROGRESS NOTES
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 ([facility] [address] )  G44 309     S06 9X0S                   Subjective: Patient reports no new changes, complaints, or falls  He noted HA is 2/10 and dizziness is 2/10 upon arrival       Objective: See treatment diary below  PT verbal and tactile cues for technique and progression  TE:  - UT Stretch: 3 reps, 30 sec B/L  - LV Stretch: 3 reps, 30 sec B/L  - Chin tucks w/ cervical extension: 2 sets, 10 reps  - Shoulder circles 10x CW/CCW    NMR:  VORx1 (30 sec, self paced - keeping in focus, 2 sets)  - H: 5/10 dizziness (5/10 dizziness)  - V: 5/10 dizziness  (5/10 dizziness)    - FTECAC (firm): 10"x10, no LOB  - FTHTEO (firm): 10x H/V each, no LOB  - FAEC (foam): 4 reps, 30 sec , no LOB  -Biodex LOS easy 34%, 43%; Maze easy 39%  - Gait w/ VF and increased speed x 4 min    - NuStep L1 60 SPM       Assessment: Patient able to tolerate treatment session well today  Able to advance balance ex's without complaint, improved postural stability noted, improved gait with visual fixation  Headache 0/10, dizziness 1/10 post PT  He will continue to benefit from skilled outpatient PT on order to maximize his function and reduce his symptoms  Plan: Continue per plan of care       Outcome Measures Initial Eval          mCTSIB  - FTEO (firm)  - FTEC (firm)  - FTEO (foam)  - FTEC (foam)    sec   sec   sec   sec        DGI /24        FGA /30        GAYLE  Errors        DHI /100        PSSS /22  /132        5xSTS  24 62 sec w/ 2 UE       TUG  20 97 sec w/ 2 UE

## 2021-08-09 NOTE — PROGRESS NOTES
Daily Note     Today's date: 2021  Patient name: Efrem Cabrera  : 1955  MRN: [id number]  Referring provider: Elisa Pelaez DO  Dx:   Encounter Diagnosis     ICD-10-CM    1  Concussion with loss of consciousness, subsequent encounter  S06  0X9D        Start Time: 1401  Stop Time: 8522  Total time in clinic (min): 44 minutes    Subjective: "I can tell I'm making progress"  Pt reports that [place]wife this [place] and was able to complete 3 walks around [place], each ~3 miles without issue  HA and dizziness at: 2/10  During session: 3-4/10  End of session: 2/10    Objective: See treatment diary below    -Alexander grid with focus on horizontal saccades, diagonal saccades with head turns laterally to R to simulate looking into blind spot while driving  Does not require any cues to recall where he is in the sequence  Reports inc of dizziness to 3-4/10    -Itrax recreation on table top with model on vertical surface to L to simulate blind spot while driving and facilitate horizontal and diagonal saccades  Completes ace level within a reasonable time frame and denies inc in symptoms  Upgraded to whiz level and model slightly posterior to pt's R shoulder       -Spot-it with focus on saccades in all directions    Assessment: Tolerated treatment well  Reports decrease in symptoms during daily activities, and is noted to tolerate complex vision activities with incorporation of head turns with less of an inc in symptoms  Patient would benefit from continued OT to maximize functioning and independence with daily activities  Plan: Continue per plan of care

## 2021-08-11 ENCOUNTER — OFFICE VISIT (OUTPATIENT)
Dept: OCCUPATIONAL THERAPY | Facility: CLINIC | Age: 66
End: 2021-08-11
Payer: COMMERCIAL

## 2021-08-11 DIAGNOSIS — S06.0X9D CONCUSSION WITH LOSS OF CONSCIOUSNESS, SUBSEQUENT ENCOUNTER: Primary | ICD-10-CM

## 2021-08-11 PROCEDURE — 97530 THERAPEUTIC ACTIVITIES: CPT

## 2021-08-11 NOTE — PROGRESS NOTES
Daily Note     Today's date: 2021  Patient name: Raul Santacruz  : 1955  MRN: [id number]  Referring provider: Rayo Dubon DO  Dx:   Encounter Diagnosis     ICD-10-CM    1  Concussion with loss of consciousness, subsequent encounter  S06  0X9D        Start Time: 1618  Stop Time: 1700  Total time in clinic (min): 42 minutes    Subjective: "I have a HA today"    HA at start of session: 4/10  Dizziness at start: 3/10    HA at end of session: 4/10  Dizziness at end of session: 3/10    Objective:     - Looking Chart with focus on horizontal saccades, visual memory  Reports mild inc in dizziness, no inc in HA  Upgraded to include increased demand on working memory by recalling items with card occluded after ~5-10 seconds  Requires 2nd look at items ~30% of items  -Mental manipulation with cards on the ground with focus on recalling and locating 3 items (any order) with saccades in all directions and head turns  Requires 1 rest break 2* inc in dizziness and neck pain      Assessment: Tolerated treatment well  Demonstrating inc tolerance to complex visual tasks with addition of slow head turns  Patient would benefit from continued OT      Plan: Continue per plan of care

## 2021-08-12 ENCOUNTER — OFFICE VISIT (OUTPATIENT)
Dept: PHYSICAL THERAPY | Facility: CLINIC | Age: 66
End: 2021-08-12
Payer: COMMERCIAL

## 2021-08-12 DIAGNOSIS — R42 DIZZINESS: ICD-10-CM

## 2021-08-12 DIAGNOSIS — S06.0X9A CONCUSSION WITH LOSS OF CONSCIOUSNESS, INITIAL ENCOUNTER: Primary | ICD-10-CM

## 2021-08-12 DIAGNOSIS — M54.2 CERVICALGIA: ICD-10-CM

## 2021-08-12 DIAGNOSIS — S06.9X0S HEADACHE AS LATE EFFECT OF BRAIN INJURY (HCC): ICD-10-CM

## 2021-08-12 DIAGNOSIS — G44.309 HEADACHE AS LATE EFFECT OF BRAIN INJURY (HCC): ICD-10-CM

## 2021-08-12 DIAGNOSIS — R26.89 IMBALANCE: ICD-10-CM

## 2021-08-12 PROCEDURE — 97110 THERAPEUTIC EXERCISES: CPT

## 2021-08-12 PROCEDURE — 97112 NEUROMUSCULAR REEDUCATION: CPT

## 2021-08-12 NOTE — PROGRESS NOTES
Daily Note  IE: 2021 (POC: 10-)    Today's date: 2021  Patient name: Charla Miller  : 1955  MRN: [id number]  Referring provider: Sebas Roblero DO  Dx:   No diagnosis found  Subjective: Patient reports no new changes, complaints, or falls  He reports feeling "great" yesterday without headache  He noted HA is 2/10, cervical pain, and  dizziness is 2/10 upon arrival       Objective: See treatment diary below  PT verbal and tactile cues for technique and progression  TE:  - UT Stretch: 3 reps, 30 sec B/L  - LV Stretch: 3 reps, 30 sec B/L  - Chin tucks w/ cervical extension: 2 sets, 10 reps  - Shoulder circles 10x CW/CCW    NMR:  VORx1 (60 sec, self paced - keeping in focus, 2 sets)  - H: 2/10 dizziness (4/10 dizziness)  - V: 4/10 dizziness  (/10 dizziness)    - FTEC (foam): 10 reps, 10 sec , no LOB  - Blue Foam FCHT 10x H/V, no LOB  - Blue Foam VOR Cx 10 x H/V each, no LOB  -Biodex LOS easy 47%, 52%; Maze easy 60%%  - Gait w/ VF and increased speed x 4 min    - Stationary Bike L3 x 2min, L4 x 3 min, L5 x 5 min (10 minutes total)      Assessment: Patient able to tolerate treatment session well today  Able to advance balance ex's without complaint, improved postural stability noted, improved gait with visual fixation  Headache 0/10, dizziness 1/10, C-spine 0/10  post PT  C-spine AROM significantly improved with balance and VOR ex's- no increase in  pain reported  He will continue to benefit from skilled outpatient PT on order to maximize his function and reduce his symptoms  Plan: Continue per plan of care       Outcome Measures Initial Eval          mCTSIB  - FTEO (firm)  - FTEC (firm)  - FTEO (foam)  - FTEC (foam)    sec   sec   sec   sec        DGI /24        FGA /30        GAYLE  Errors        DHI /100        PSSS /22  /132        5xSTS  24 62 sec w/ 2 UE       TUG  20 97 sec w/ 2 UE

## 2021-08-16 ENCOUNTER — OFFICE VISIT (OUTPATIENT)
Dept: PHYSICAL THERAPY | Facility: CLINIC | Age: 66
End: 2021-08-16
Payer: COMMERCIAL

## 2021-08-16 ENCOUNTER — OFFICE VISIT (OUTPATIENT)
Dept: OCCUPATIONAL THERAPY | Facility: CLINIC | Age: 66
End: 2021-08-16
Payer: COMMERCIAL

## 2021-08-16 DIAGNOSIS — M54.2 CERVICALGIA: ICD-10-CM

## 2021-08-16 DIAGNOSIS — R42 DIZZINESS: ICD-10-CM

## 2021-08-16 DIAGNOSIS — G44.309 HEADACHE AS LATE EFFECT OF BRAIN INJURY (HCC): ICD-10-CM

## 2021-08-16 DIAGNOSIS — S06.0X9D CONCUSSION WITH LOSS OF CONSCIOUSNESS, SUBSEQUENT ENCOUNTER: Primary | ICD-10-CM

## 2021-08-16 DIAGNOSIS — S06.9X0S HEADACHE AS LATE EFFECT OF BRAIN INJURY (HCC): ICD-10-CM

## 2021-08-16 DIAGNOSIS — R26.89 IMBALANCE: ICD-10-CM

## 2021-08-16 DIAGNOSIS — S06.0X9A CONCUSSION WITH LOSS OF CONSCIOUSNESS, INITIAL ENCOUNTER: Primary | ICD-10-CM

## 2021-08-16 PROCEDURE — 97112 NEUROMUSCULAR REEDUCATION: CPT | Performed by: PHYSICAL THERAPIST

## 2021-08-16 PROCEDURE — 97110 THERAPEUTIC EXERCISES: CPT | Performed by: PHYSICAL THERAPIST

## 2021-08-16 PROCEDURE — 97530 THERAPEUTIC ACTIVITIES: CPT

## 2021-08-16 NOTE — PROGRESS NOTES
Daily Note     Today's date: 2021  Patient name: Kishore Mccormack  : 1955  MRN: [id number]  Referring provider: Opal Lopez DO  Dx:   Encounter Diagnosis     ICD-10-CM    1  Concussion with loss of consciousness, subsequent encounter  S06  0X9D        Start Time: 1  Stop Time: 8353  Total time in clinic (min): 42 minutes    Subjective: "I shouldn't tell you this, but I rode my bike this weekend"  Dizziness at start of session: 1/10    Objective:  -Pixy cubes with model on vertical surface laterally to R and recreate on L side of table with focus on horizontal saccades, , head turns to simulate looking in blind spot while driving  Completes 4 block pattern within a reasonable time frame without c/o dizziness  Upgraded to non-divided model and completes with mildly inc time    -Word circles on vertical window in stance on blue foam with focus on saccades in all directions, figure ground in congested environment  Completes without cues, requires mildly inc time to locate pieces    Assessment: Tolerated treatment well  Demonstrating significantly decreased dizziness and HA with complex visual tasks  Patient would benefit from continued OT       Plan: Continue per plan of care

## 2021-08-16 NOTE — PROGRESS NOTES
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 ([facility] [address] )  G44 309     S06 9X0S                   Subjective: Patient reports no new changes, complaints, or falls  He currently reports his HA/dizziness/neck pain at 2/10  He also states that he has been riding his bike, PT advised against riding bike for now outside of therapy    Objective: See treatment diary below  PT verbal and tactile cues for technique and progression  TE:  - UT Stretch: 3 reps, 30 sec B/L  - LV Stretch: 3 reps, 30 sec B/L  - Chin tucks w/ cervical extension: 2 sets, 10 reps  - Shoulder circles 10x CW/CCW    NMR:  VORx1 (60 sec, self paced - keeping in focus, 2 sets)  - H: 3/10 dizziness, 4/10 dizziness  - V: 4/10 dizziness, 4/10 dizziness    - FTEC (foam): 5 reps, 30 sec , no LOB  - Tandem Stance: 30 sec, 5x, B/L  - Tandem Ambulation: 10 ft x 4 laps      Assessment: Patient tolerated treatment well  He reports improvements with his neck mobility and reduction in pain after stretching  He also states that his dizziness has been improving over the last couple weeks, with a max 4/10 dizziness today  Patient shows improved balance using visual fixation, when on unsteady surfaces and with NBOS  He will continue to benefit from skilled PT services to maximize his function and reduce his symptoms  Plan: Continue per plan of care       Outcome Measures Initial Eval          mCTSIB  - FTEO (firm)  - FTEC (firm)  - FTEO (foam)  - FTEC (foam)    sec   sec   sec   sec        DGI /24        FGA /30        GAYLE  Errors        DHI /100        PSSS /22  /132        5xSTS  24 62 sec w/ 2 UE       TUG 20 97 sec w/ 2 UE

## 2021-08-18 ENCOUNTER — OFFICE VISIT (OUTPATIENT)
Dept: OCCUPATIONAL THERAPY | Facility: CLINIC | Age: 66
End: 2021-08-18
Payer: COMMERCIAL

## 2021-08-18 ENCOUNTER — OFFICE VISIT (OUTPATIENT)
Dept: PHYSICAL THERAPY | Facility: CLINIC | Age: 66
End: 2021-08-18
Payer: COMMERCIAL

## 2021-08-18 DIAGNOSIS — S06.0X9A CONCUSSION WITH LOSS OF CONSCIOUSNESS, INITIAL ENCOUNTER: Primary | ICD-10-CM

## 2021-08-18 DIAGNOSIS — S06.0X9D CONCUSSION WITH LOSS OF CONSCIOUSNESS, SUBSEQUENT ENCOUNTER: Primary | ICD-10-CM

## 2021-08-18 DIAGNOSIS — M54.2 CERVICALGIA: ICD-10-CM

## 2021-08-18 DIAGNOSIS — R26.89 IMBALANCE: ICD-10-CM

## 2021-08-18 DIAGNOSIS — S06.9X0S HEADACHE AS LATE EFFECT OF BRAIN INJURY (HCC): ICD-10-CM

## 2021-08-18 DIAGNOSIS — R42 DIZZINESS: ICD-10-CM

## 2021-08-18 DIAGNOSIS — G44.309 HEADACHE AS LATE EFFECT OF BRAIN INJURY (HCC): ICD-10-CM

## 2021-08-18 PROCEDURE — 97110 THERAPEUTIC EXERCISES: CPT

## 2021-08-18 PROCEDURE — 97530 THERAPEUTIC ACTIVITIES: CPT

## 2021-08-18 PROCEDURE — 97112 NEUROMUSCULAR REEDUCATION: CPT

## 2021-08-18 NOTE — PROGRESS NOTES
Daily Note     Today's date: 2021  Patient name: Deloris Taylor  : 1955  MRN: [id number]  Referring provider: Wandy Saldana DO  Dx:   Encounter Diagnosis     ICD-10-CM    1  Concussion with loss of consciousness, subsequent encounter  S06  0X9D        Start Time: 1078  Stop Time: 1530  Total time in clinic (min): 45 minutes    Subjective: "I haven't been sleeping well"    Dizziness at start of session: 2/10    Objective:     -Letter crossout with focus on horizontal saccades  Requires inc time    -Abbreviation word search on slant board with focus on visual accomodation and saccades in all directions  Denies inc in dizziness or HA    -Mental manipulation alphabetical order with words placed on window, vertical surface b/l sides and down on window sill while pt stands on blue foam   Focus on visually scanning environment in all directions, working memory, and dynamic standing balance with dec sensory input b/l feet and addition of head turns  Dizziness with mild inc to 3/10   1 minor LOB      Assessment: Tolerated treatment well  Continues to demonstrate improved tolerance and decreased symptom exacerbation with complex visual and cognitive tasks  Patient would benefit from continued OT      Plan: Continue per plan of care

## 2021-08-18 NOTE — PROGRESS NOTES
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 ([facility] [address] )  G44 309     S06 9X0S                   Subjective: Patient reports no new changes, complaints, or falls  He currently reports his HA/dizziness/neck pain at 210  He also states that he has been riding his bike, PT advised against riding bike for now outside of therapy  Objective: See treatment diary below  PT verbal and tactile cues for technique and progression  TE:  - UT Stretch: 3 reps, 30 sec B/L  - LV Stretch: 3 reps, 30 sec B/L  - Chin tucks w/ cervical extension: 2 sets, 10 reps  - Shoulder circles 10x CW/CCW    Baseline HR: 67 bpm  Baseline HA/dizziness: 2/10  HR Max: 220-85=995  60% - 91 2 bpm  80% - 121 6 bpm    Treadmill  - 0-2 min: 2 0 mph, 0% incline HA: 2/10, Dizziness: 2/10, HR: 80 bpm  - 2-4 min: 2 5 mph, 0% incline HA: 1/10, Dizziness: 1/10, HR: 80 bpm  - 4-6 min: 3 0 mph, 0% incline HA: 0/10, Dizziness: 1/10, HR: 90 bpm  - 6-8 min: 3 0 mph, 2% incline HA: 0/10, Dizziness: 1/10, HR: 102 bpm  - 8-13 min: 3 0 mph, 5% incline HA: 0/10, Dizziness: 1/10, HR: 127 bpm  - 13-15 min: 3 0 mph, 0% incline HA: 0/10, Dizziness: 1/10, HR: 110 bpm  - 15-19 min: 2 5-1 5 mph, 0% incline HA: 0/10, Dizziness: 1/10, HR: 90 bpm    NMR:  VORx1 (60 sec, self paced - keeping in focus, 2 sets)  - H: 3/10 dizziness, 3/10 dizziness      Assessment: Trialed physiologic stress today via treadmill ambulation with good tolerance and overall reduction in HA and dizziness symptoms   Demonstrated forward flexed posture with increased speed of treadmill to utilize momentum and festinating to keep up, with improvements noted in upright posture and forward progression of LEs following PT verbal cues  Difficulty with coordination of VOR head turns with metronome requiring continued self pacing  He will continue to benefit from skilled PT services to maximize his function and reduce his symptoms  Plan: Continue per plan of care       Outcome Measures Initial Eval          mCTSIB  - FTEO (firm)  - FTEC (firm)  - FTEO (foam)  - FTEC (foam)    sec   sec   sec   sec        DGI /24        FGA /30        GAYLE  Errors        DHI /100        PSSS /22  /132        5xSTS  24 62 sec w/ 2 UE       TUG  20 97 sec w/ 2 UE

## 2021-08-23 ENCOUNTER — OFFICE VISIT (OUTPATIENT)
Dept: PHYSICAL THERAPY | Facility: CLINIC | Age: 66
End: 2021-08-23
Payer: COMMERCIAL

## 2021-08-23 ENCOUNTER — OFFICE VISIT (OUTPATIENT)
Dept: OCCUPATIONAL THERAPY | Facility: CLINIC | Age: 66
End: 2021-08-23
Payer: COMMERCIAL

## 2021-08-23 DIAGNOSIS — R26.89 IMBALANCE: ICD-10-CM

## 2021-08-23 DIAGNOSIS — G44.309 HEADACHE AS LATE EFFECT OF BRAIN INJURY (HCC): ICD-10-CM

## 2021-08-23 DIAGNOSIS — S06.0X9D CONCUSSION WITH LOSS OF CONSCIOUSNESS, SUBSEQUENT ENCOUNTER: Primary | ICD-10-CM

## 2021-08-23 DIAGNOSIS — S06.0X9D CONCUSSION WITH LOSS OF CONSCIOUSNESS, SUBSEQUENT ENCOUNTER: ICD-10-CM

## 2021-08-23 DIAGNOSIS — R42 DIZZINESS: Primary | ICD-10-CM

## 2021-08-23 DIAGNOSIS — M54.2 CERVICALGIA: ICD-10-CM

## 2021-08-23 DIAGNOSIS — S06.9X0S HEADACHE AS LATE EFFECT OF BRAIN INJURY (HCC): ICD-10-CM

## 2021-08-23 PROCEDURE — 97112 NEUROMUSCULAR REEDUCATION: CPT

## 2021-08-23 PROCEDURE — 97110 THERAPEUTIC EXERCISES: CPT

## 2021-08-23 PROCEDURE — 97530 THERAPEUTIC ACTIVITIES: CPT | Performed by: OCCUPATIONAL THERAPIST

## 2021-08-23 NOTE — PROGRESS NOTES
Daily Note     Today's date: 2021  Patient name: Candace Sandoval  : 1955  MRN: [id number]  Referring provider: Ginger Saldivar DO  Dx:   Encounter Diagnosis     ICD-10-CM    1  Concussion with loss of consciousness, subsequent encounter  S06  0X9D        Start Time: 8102  Stop Time: 8367  Total time in clinic (min): 44 minutes    Subjective: "I feel a little off when I first stand up, but the more I do the better I feel " Pt also verbalized desire to return to driving  Objective: See treatment diary below  Trail making activity modified to use long hallway with letters and numbers 1A-16P posted along both sides of hallway  Activity incorporated frequent turning as well as working memory  Pt required 2 cues to accurately sequence letters/numbers, and had no increase in dizziness  iTrax completed with prompt on vertical surface to R and pipeline puzzle completed with prompt on vertical surface to L incorporating head turns, pursuits, accomodation for improved oculomotor control  Pt completed with no increased dizziness  Min cues provided for accurately copying pipeline puzzle  Assessment: Tolerated treatment well  Patient would benefit from continued OT to address oculomotor deficits limiting independence with IADLs  Plan: Continue per plan of care

## 2021-08-23 NOTE — PROGRESS NOTES
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 ([facility] [address] )  G44 309     S06 9X0S                   Subjective: Patient reports no new changes, complaints, or falls  He currently reports his HA/dizziness/neck pain at 0/10  He reports "as long as my mind is occupied, I feel good"  Objective: See treatment diary below  PT verbal and tactile cues for technique and progression      TE:  - UT Stretch: 3 reps, 30 sec B/L  - LV Stretch: 3 reps, 30 sec B/L  - Chin tucks w/ cervical extension: 1 sets, 10 reps, 10 sec hold  - Shoulder circles 10x CW/CCW    Baseline HR: 67 bpm  Baseline HA/dizziness: 2/10  HR Max: 220-65=584  60% - 91 2 bpm  80% - 121 6 bpm    NMR:  VORx1 (60 sec, self paced - keeping in focus, 2 sets)  - H: 0/10 dizziness   - V: 0/10 dizziness     - FTEC (foam): 10 reps, 10 sec , no LOB  - Blue Foam FTHT 10x H/V, no LOB  - Blue Foam FTEC 10"x10 w/ head tilt, 1 LOB  - Blue Foam VOR Cx 10 x H/V each, no LOB  -Biodex LOS med 46%, 64%; Maze hard -18%  - Dynamic Gait Drills- head turns, change speed, stop/ start, change direction x 4 minutes    - Gait w/ VF and increased speed x 4 min     - Stationary Bike (patient preferrred)- >=70 RPM    L3 x 2min HR 76 BPM  , L5 x 3 min, HR 79 RPE 2-3   L7 x 4 min RPE 3  L10 x3 min RPE 4  L13 x 2 min RPE 5-6  L3 x 1 min  Total 15 minutes       Treadmill- NOT TODAY   - 0-2 min: 2 0 mph, 0% incline HA: 2/10, Dizziness: 2/10, HR: 80 bpm  - 2-4 min: 2 5 mph, 0% incline HA: 1/10, Dizziness: 1/10, HR: 80 bpm  - 4-6 min: 3 0 mph, 0% incline HA: 0/10, Dizziness: 110, HR: 90 bpm  - 6-8 min: 3 0 mph, 2% incline HA: 0/10, Dizziness: 1/10, HR: 102 bpm  - 8-13 min: 3 0 mph, 5% incline HA: 0/10, Dizziness: 1/10, HR: 127 bpm  - 13-15 min: 3 0 mph, 0% incline HA: 0/10, Dizziness: 1/10, HR: 110 bpm  - 15-19 min: 2 5-1 5 mph, 0% incline HA: 0/10, Dizziness: 1/10, HR: 90 bpm    0/10 HA, 0/10 c-spine pain, 2/10 dizziness post    Assessment: Performed physiologic exertion on stationary bike per patient preference  Note improved tolerance to exertion  Able to advance dynamic balance exercises- still instability with quick head turns  Recommend patient not ride bike on road or trail and stick to using his  at home- patient voiced understanding  Headache/ c-spine pain 0/10 post session, dizziness 2/10  He will continue to benefit from skilled PT services to maximize his function and reduce his symptoms  Plan: Continue per plan of care       Outcome Measures Initial Eval          mCTSIB  - FTEO (firm)  - FTEC (firm)  - FTEO (foam)  - FTEC (foam)    sec   sec   sec   sec        DGI /24        FGA /30        GAYLE  Errors        DHI /100        PSSS /22  /132        5xSTS  24 62 sec w/ 2 UE       TUG  20 97 sec w/ 2 UE

## 2021-08-25 ENCOUNTER — HOSPITAL ENCOUNTER (OUTPATIENT)
Dept: RADIOLOGY | Facility: HOSPITAL | Age: 66
Discharge: HOME | End: 2021-08-25
Attending: PSYCHIATRY & NEUROLOGY
Payer: COMMERCIAL

## 2021-08-25 ENCOUNTER — HOSPITAL ENCOUNTER (OUTPATIENT)
Dept: RADIOLOGY | Facility: HOSPITAL | Age: 66
Discharge: HOME | End: 2021-08-25

## 2021-08-25 ENCOUNTER — OFFICE VISIT (OUTPATIENT)
Dept: PHYSICAL THERAPY | Facility: CLINIC | Age: 66
End: 2021-08-25
Payer: COMMERCIAL

## 2021-08-25 ENCOUNTER — HOSPITAL ENCOUNTER (OUTPATIENT)
Dept: RADIOLOGY | Facility: HOSPITAL | Age: 66
Discharge: HOME | End: 2021-08-25
Attending: ORTHOPAEDIC SURGERY
Payer: COMMERCIAL

## 2021-08-25 ENCOUNTER — OFFICE VISIT (OUTPATIENT)
Dept: OCCUPATIONAL THERAPY | Facility: CLINIC | Age: 66
End: 2021-08-25
Payer: COMMERCIAL

## 2021-08-25 DIAGNOSIS — Z91.81 HISTORY OF FALL: ICD-10-CM

## 2021-08-25 DIAGNOSIS — R42 DIZZINESS: ICD-10-CM

## 2021-08-25 DIAGNOSIS — R26.89 BALANCE PROBLEM: ICD-10-CM

## 2021-08-25 DIAGNOSIS — F07.81 POST CONCUSSION SYNDROME: ICD-10-CM

## 2021-08-25 DIAGNOSIS — S06.0X9D CONCUSSION WITH LOSS OF CONSCIOUSNESS, SUBSEQUENT ENCOUNTER: Primary | ICD-10-CM

## 2021-08-25 DIAGNOSIS — M43.6 NECK STIFFNESS: ICD-10-CM

## 2021-08-25 DIAGNOSIS — R26.89 IMBALANCE: ICD-10-CM

## 2021-08-25 DIAGNOSIS — M54.2 CERVICALGIA: ICD-10-CM

## 2021-08-25 DIAGNOSIS — R51.9 CHRONIC DAILY HEADACHE: ICD-10-CM

## 2021-08-25 DIAGNOSIS — M54.2 NECK PAIN: ICD-10-CM

## 2021-08-25 PROCEDURE — 97112 NEUROMUSCULAR REEDUCATION: CPT | Performed by: PHYSICAL THERAPIST

## 2021-08-25 PROCEDURE — 97530 THERAPEUTIC ACTIVITIES: CPT

## 2021-08-25 PROCEDURE — G1004 CDSM NDSC: HCPCS

## 2021-08-25 PROCEDURE — 70551 MRI BRAIN STEM W/O DYE: CPT

## 2021-08-25 PROCEDURE — 97112 NEUROMUSCULAR REEDUCATION: CPT

## 2021-08-25 PROCEDURE — 72141 MRI NECK SPINE W/O DYE: CPT

## 2021-08-25 NOTE — PROGRESS NOTES
Daily Note     Today's date: 2021  Patient name: Aylin Rosas  : 1955  MRN: [id number]  Referring provider: Michelle Mak DO  Dx:   Encounter Diagnosis   Name Primary?  Concussion with loss of consciousness, subsequent encounter Yes                  Precautions: HTN, possible previous concussions  Visit 9 of Iramtyree Reyes   PN due   POC valid 21-10/29/21    Subjective: "I think I had other concussions now that I'm learning about all of this "      Objective: See treatment below  In stance, completed directional arrow word search with word boxes on right and left sides of wall to facilitate head turns and dynamic ocular movements, as well as increase patients ability to alternate attention  Assessment: Tolerated treatment well  Completed 75% of word search within 45 min session  Difficulty understanding directions at start of task, but then able to carryover directions  Alternating attention continues to be a challenge for patient, especially in a multi-modal environment  Plan: Continued skilled OT per POC

## 2021-08-25 NOTE — PROGRESS NOTES
Daily Note  IE: 2021 (POC: 10-)    Today's date: 2021  Patient name: Opal Terrell  : 1955  MRN: [id number]  Referring provider: Carla Balderas DO  Dx:   Encounter Diagnosis     ICD-10-CM    1  Concussion with loss of consciousness, subsequent encounter  S06  0X9D    2  Dizziness  R42    3  Cervicalgia  M54 2    4  Imbalance  R26 89                   Subjective: Patient reports frustration and stress with insurance and disability process which had increased his symptoms today  HA 2-3/10, Dizziness 2-3/10, no neck pain, just stiffness  "Given me some to do so I can distract my mind"  Objective: See treatment diary below  PT verbal and tactile cues for technique and progression  TE:  - UT Stretch: 2 reps, 30 sec B/L  - LV Stretch: 2 reps, 30 sec B/L  - Chin tucks w/ cervical extension: 1 sets, 10 reps, 10 sec hold    NMR:  - FTEC (foam): 2 x 30 sec no LOB   - Blue Foam FTHT 2 x10 H/V, no LOB  - Blue Foam FTEC 1 x10 H/V, 2 LOB Horizontal     Baseline HR: 78 bpm  Baseline HA/dizziness: 2/10  HR Max: 220-16=503  60% - 91 2 bpm  80% - 121 6 bpm    - Stationary Bike (patient preferrred)- >=70 RPM  0-4 minutes L5 RPE 2-3  4-6 minutes L7 RPE 2-3  6-8 minutes L9 RPE L7 x 4 min RPE 3  8-10 minutes L7 RPE 2-3  10-12 minutes L5 RPE 2  12-13 minutes L2  RPE 1  Total 13 minutes       2/10 HA, 0/10 c-spine pain, 2/10 dizziness post    Assessment: Pt was able to tolerate treatment session well today  Improved stability with static foam exercises today with progression to FTEC with dynamic head turns which resulted in 2 LOB    Cardiovascular exertion on stationary bike was performed again with improved tolerance to exertion and decrease in symptoms during activity per subjective report of decrease symptoms and RPE with ability to maintain RPM above 70   Pt continues to experience increased dizziness after biking which was decrease in less than 60 seconds and was improved with ambulation and would benefit from a cool down  Pt educated on safety and recommend patient not ride bike far from home or trail walking instead of biking  Possible trial of strength based exercises to increase heart rate at next session instead of cardio  He will continue to benefit from skilled PT services to maximize his function and reduce his symptoms  Plan: Continue per plan of care       Outcome Measures Initial Eval          mCTSIB  - FTEO (firm)  - FTEC (firm)  - FTEO (foam)  - FTEC (foam)    sec   sec   sec   sec        DGI /24        FGA /30        GAYLE  Errors        DHI /100        PSSS /22  /132        5xSTS  24 62 sec w/ 2 UE       TUG  20 97 sec w/ 2 UE

## 2021-08-30 ENCOUNTER — OFFICE VISIT (OUTPATIENT)
Dept: OCCUPATIONAL THERAPY | Facility: CLINIC | Age: 66
End: 2021-08-30
Payer: COMMERCIAL

## 2021-08-30 ENCOUNTER — OFFICE VISIT (OUTPATIENT)
Dept: PHYSICAL THERAPY | Facility: CLINIC | Age: 66
End: 2021-08-30
Payer: COMMERCIAL

## 2021-08-30 DIAGNOSIS — M54.2 CERVICALGIA: ICD-10-CM

## 2021-08-30 DIAGNOSIS — G44.309 HEADACHE AS LATE EFFECT OF BRAIN INJURY (HCC): ICD-10-CM

## 2021-08-30 DIAGNOSIS — R26.89 IMBALANCE: ICD-10-CM

## 2021-08-30 DIAGNOSIS — S06.0X9D CONCUSSION WITH LOSS OF CONSCIOUSNESS, SUBSEQUENT ENCOUNTER: Primary | ICD-10-CM

## 2021-08-30 DIAGNOSIS — S06.0X9A CONCUSSION WITH LOSS OF CONSCIOUSNESS, INITIAL ENCOUNTER: ICD-10-CM

## 2021-08-30 DIAGNOSIS — S06.9X0S HEADACHE AS LATE EFFECT OF BRAIN INJURY (HCC): ICD-10-CM

## 2021-08-30 DIAGNOSIS — R42 DIZZINESS: ICD-10-CM

## 2021-08-30 PROCEDURE — 97530 THERAPEUTIC ACTIVITIES: CPT | Performed by: PHYSICAL THERAPIST

## 2021-08-30 PROCEDURE — 97112 NEUROMUSCULAR REEDUCATION: CPT

## 2021-08-30 PROCEDURE — 97530 THERAPEUTIC ACTIVITIES: CPT
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
## 2021-08-30 NOTE — PROGRESS NOTES
Daily Note     Today's date: 2021  Patient name: Tc Coombs  : 1955  MRN: [id number]  Referring provider: Yusuf Ramos DO  Dx:   Encounter Diagnosis   Name Primary?  Concussion with loss of consciousness, subsequent encounter Yes                  Precautions: HTN, possible previous concussions  Visit 9 of Thais Penny   PN due   POC valid 21-10/29/21    Subjective: "I forgot to bring my homework from last time "      Objective: See treatment below  Initiated session with education and information for Dr Hook neuro optometry  Completed letter transfer alternating between last and first for alternated attention and oculo motor movements  Assessment: Tolerated treatment well  Loss of place at times, but able to complete independently  HA increased to a 4/10 by end of session  Plan: Continued skilled OT per POC

## 2021-08-31 ENCOUNTER — OFFICE VISIT (OUTPATIENT)
Dept: OBGYN CLINIC | Facility: CLINIC | Age: 66
End: 2021-08-31
Payer: COMMERCIAL

## 2021-08-31 ENCOUNTER — TELEPHONE (OUTPATIENT)
Dept: OBGYN CLINIC | Facility: CLINIC | Age: 66
End: 2021-08-31

## 2021-08-31 ENCOUNTER — APPOINTMENT (OUTPATIENT)
Dept: RADIOLOGY | Facility: CLINIC | Age: 66
End: 2021-08-31
Payer: COMMERCIAL

## 2021-08-31 VITALS
HEART RATE: 64 BPM | SYSTOLIC BLOOD PRESSURE: 150 MMHG | DIASTOLIC BLOOD PRESSURE: 82 MMHG | BODY MASS INDEX: 29.35 KG/M2 | WEIGHT: 205 LBS | HEIGHT: 70 IN

## 2021-08-31 DIAGNOSIS — G91.2 NPH (NORMAL PRESSURE HYDROCEPHALUS) (HCC): Primary | ICD-10-CM

## 2021-08-31 DIAGNOSIS — S42.022G CLOSED DISPLACED FRACTURE OF SHAFT OF LEFT CLAVICLE WITH DELAYED HEALING, SUBSEQUENT ENCOUNTER: ICD-10-CM

## 2021-08-31 DIAGNOSIS — G91.9 HYDROCEPHALUS, UNSPECIFIED TYPE (HCC): ICD-10-CM

## 2021-08-31 DIAGNOSIS — R26.89 BALANCE PROBLEM: ICD-10-CM

## 2021-08-31 DIAGNOSIS — S42.022G CLOSED DISPLACED FRACTURE OF SHAFT OF LEFT CLAVICLE WITH DELAYED HEALING, SUBSEQUENT ENCOUNTER: Primary | ICD-10-CM

## 2021-08-31 PROCEDURE — 99214 OFFICE O/P EST MOD 30 MIN: CPT | Performed by: ORTHOPAEDIC SURGERY

## 2021-08-31 PROCEDURE — 73000 X-RAY EXAM OF COLLAR BONE: CPT
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
## 2021-09-01 ENCOUNTER — OFFICE VISIT (OUTPATIENT)
Dept: OCCUPATIONAL THERAPY | Facility: CLINIC | Age: 66
End: 2021-09-01
Payer: COMMERCIAL

## 2021-09-01 ENCOUNTER — TELEPHONE (OUTPATIENT)
Dept: NEUROLOGY | Facility: CLINIC | Age: 66
End: 2021-09-01

## 2021-09-01 ENCOUNTER — OFFICE VISIT (OUTPATIENT)
Dept: PHYSICAL THERAPY | Facility: CLINIC | Age: 66
End: 2021-09-01
Payer: COMMERCIAL

## 2021-09-01 DIAGNOSIS — G44.309 HEADACHE AS LATE EFFECT OF BRAIN INJURY (HCC): ICD-10-CM

## 2021-09-01 DIAGNOSIS — R26.89 IMBALANCE: ICD-10-CM

## 2021-09-01 DIAGNOSIS — S06.0X9D CONCUSSION WITH LOSS OF CONSCIOUSNESS, SUBSEQUENT ENCOUNTER: Primary | ICD-10-CM

## 2021-09-01 DIAGNOSIS — R42 DIZZINESS: ICD-10-CM

## 2021-09-01 DIAGNOSIS — S06.9X0S HEADACHE AS LATE EFFECT OF BRAIN INJURY (HCC): ICD-10-CM

## 2021-09-01 DIAGNOSIS — M54.2 CERVICALGIA: ICD-10-CM

## 2021-09-01 PROCEDURE — 97116 GAIT TRAINING THERAPY: CPT

## 2021-09-01 PROCEDURE — 97112 NEUROMUSCULAR REEDUCATION: CPT

## 2021-09-01 PROCEDURE — 97530 THERAPEUTIC ACTIVITIES: CPT

## 2021-09-01 NOTE — PROGRESS NOTES
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 ([facility] [address] )  G44 309     S06 9X0S                   Subjective: Patient reports frustration and stress with insurance and disability process which had increased his symptoms today  HA 2-3/10, Dizziness 2-3/10, no neck pain, just stiffness  "Given me some to do so I can distract my mind"  Objective: See treatment diary below  PT verbal and tactile cues for technique and progression  TE:  - UT Stretch: 2 reps, 30 sec B/L  - LV Stretch: 2 reps, 30 sec B/L  - Chin tucks w/ cervical extension: 1 set, 10 reps, 10 sec hold   - Scap retraction: 10 second hold x15    NMR:  - FTEC (foam): 2 x 30 sec no LOB   - Blue Foam FTEC HT 15x, 1 LOB, dizziness 4/10  - Blue Foam FTEC HN 10x, 2 LOB, Casey, dizziness 4/10    Baseline HR: 78 bpm  Baseline HA/dizziness: 2/10  HR Max: 220-43=456  60% - 91 2 bpm  80% - 121 6 bpm    - Stationary Bike (patient preferrred)- >=70 RPM  0-4 minutes L5 RPE 2-3  4-6 minutes L7 RPE 2-3  6-8 minutes L9 RPE 3-4  8-10 minutes L9 RPE 3-4  10-12 minutes L10 RPE 3-4  12-15 minutes L2  RPE 1  Total 15 minutes       0/10 HA, 0/10 c-spine pain, 0-1/10 dizziness post    Assessment: Pt was able to tolerate treatment session well today  Patient responded well to scap retractions with no increase in symptoms  Patient challenged with EC and head movements and required Casey with 2 LOB  Patient continues to respond well to cardiovascular exertion on stationary bike per RPE and subjective report  Patient responded well to additional time for cool down on bike    Possible trial of strength based exercises to increase heart rate at next session instead of cardio  He will continue to benefit from skilled PT services to maximize his function and reduce his symptoms  Plan: Continue per plan of care         Outcome Measures Initial Eval   PN  8/30/2021       mCTSIB  - FTEO (firm)  - FTEC (firm)  - FTEO (foam)  - FTEC (foam)     30 sec  30 sec  30 sec  30 sec       DGI /24        FGA /30-        GAYLE  Errors        DHI /100        PSSS /22  /132        5xSTS 24 62 sec 16 1 sec       TUG 20 97 sec 13 88 sec       6 MWT  1196 ft       10 MWT  1 12 m/s

## 2021-09-01 NOTE — TELEPHONE ENCOUNTER
----- Message from Arnulfo Kevin MD sent at 8/31/2021 11:47 AM EDT -----  Can you let patient know that his mri cervical spine shows moderate right sided C4-5 canal narrowing for which if he agrees should try neck PT and if not better than referral to pain management center

## 2021-09-01 NOTE — PROGRESS NOTES
Daily Note     Today's date: 2021  Patient name: Doron Albert  : 1955  MRN: [id number]  Referring provider: Minerva Benitez DO  Dx:   Encounter Diagnosis   Name Primary?  Concussion with loss of consciousness, subsequent encounter Yes                  Precautions: HTN, possible previous concussions  Visit 9  [facility]   PN due   POC valid 21-10/29/21    Subjective: "I forgot to bring my homework from last time "      Objective: See treatment below  Initiated session with spot it, alternating visual planes of horizontal, vertical, and diagonal to increase saccades and also working memory by recalling previous item  5 letter word unscrambles with clutter letter tiles to increase attention and visual tolerance to clutter  Assessment: Tolerated treatment fair  Poor tolerance to multi modal environment with music and conversation  Good ability to recall items with working memory task  Plan: Continued skilled OT per POC

## 2021-09-06 ENCOUNTER — APPOINTMENT (OUTPATIENT)
Dept: OCCUPATIONAL THERAPY | Facility: CLINIC | Age: 66
End: 2021-09-06
Payer: COMMERCIAL

## 2021-09-07 ENCOUNTER — TELEPHONE (OUTPATIENT)
Dept: OBGYN CLINIC | Facility: CLINIC | Age: 66
End: 2021-09-07

## 2021-09-07 ENCOUNTER — OFFICE VISIT (OUTPATIENT)
Dept: PHYSICAL THERAPY | Facility: CLINIC | Age: 66
End: 2021-09-07
Payer: COMMERCIAL

## 2021-09-07 DIAGNOSIS — G44.309 HEADACHE AS LATE EFFECT OF BRAIN INJURY (HCC): ICD-10-CM

## 2021-09-07 DIAGNOSIS — S06.9X0S HEADACHE AS LATE EFFECT OF BRAIN INJURY (HCC): ICD-10-CM

## 2021-09-07 DIAGNOSIS — S06.0X9D CONCUSSION WITH LOSS OF CONSCIOUSNESS, SUBSEQUENT ENCOUNTER: Primary | ICD-10-CM

## 2021-09-07 DIAGNOSIS — R26.89 IMBALANCE: ICD-10-CM

## 2021-09-07 DIAGNOSIS — R42 DIZZINESS: ICD-10-CM

## 2021-09-07 DIAGNOSIS — M54.2 CERVICALGIA: ICD-10-CM

## 2021-09-07 PROCEDURE — 97112 NEUROMUSCULAR REEDUCATION: CPT | Performed by: PHYSICAL THERAPIST

## 2021-09-07 NOTE — PROGRESS NOTES
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 ([facility] [address] )  G44 309     S06 9X0S                   Subjective: Patient reports frustration with continued concussion symptoms  HA 2/10, Dizziness 2-3/10, no neck pain, just stiffness  Pt reports difficulty with descending stairs and ambulating on sloped ground  Objective: See treatment diary below  PT verbal and tactile cues for technique and progression  TE:  - UT Stretch: 2 reps, 30 sec B/L  - LV Stretch: 2 reps, 30 sec B/L  - Chin tucks w/ cervical extension: 1 set, 10 reps, 10 sec hold   - Scap retraction: 10 second hold x15    NMR:  Baseline HR: 78 bpm  Baseline HA/dizziness: 2/10  HR Max: 220-48=104  60% - 91 2 bpm  80% - 121 6 bpm    - Stationary Bike (patient preferrred)- >= 80 RPM  0-4 minutes L5 RPE 2-3 H 2/10, D 2/10  4-8 minutes L7 RPE 2-3 H 2/10, D 2/10  8- 10 minutes L9 RPE 4 H 1-2/10, D 1-2/10  10-12 minutes L11 RPE 4-5 H 1-2/10, D 1-2/10  12- 14 minutes L11 RPE 5  H 0/10, D 1/10  14-16 minutes L9 RPE 5  H 0/10, D 1/10  16-18 minutes L7 RPE 2-3  H 0/10, D 1/10  18-20  minutes L5 RPE 2 H 0/10, D 1/10  20-22 minutes L3 RPE 2 H 0/10, D 1/10  22-25 minutes L1 RPE 1-2 H 0/10, D 1/10 (80-50 RPM)   Total 25 minutes     1/10 HA, 0/10 c-spine pain, 1-2/10 dizziness post    Assessment: Pt was able to tolerate treatment session well today  Patient responded well to scap retractions with no increase in symptoms   During cardiovascular training on the bike, pt was able to tolerate increased time and RPM  He demonstrated increased RPE compared to previous session with continued improvements of headache and dizziness while biking  Patient responded well to additional time for cool down on bike, but still reports increased dizziness with transition on and off the bike  Plan to incorporate dynamic gait training exercises to challenge depth perception  Possible trial of strength based exercises to increase heart rate at next session instead of cardio  He will continue to benefit from skilled PT services to maximize his function and reduce his symptoms  Plan: Continue per plan of care         Outcome Measures Initial Eval   PN  8/30/2021       mCTSIB  - FTEO (firm)  - FTEC (firm)  - FTEO (foam)  - FTEC (foam)     30 sec  30 sec  30 sec  30 sec       DGI /24        FGA /30-        GAYLE  Errors        DHI /100        PSSS /22  /132        5xSTS 24 62 sec 16 1 sec       TUG 20 97 sec 13 88 sec       6 MWT  1196 ft       10 MWT  1 12 m/s

## 2021-09-07 NOTE — TELEPHONE ENCOUNTER
Called and lvm for Latesha with our direct office fax as she has been trying to send something for the [facility] since the end of August  I provided our direct office number [phone] as we have not received any forms for this patient       P# [phone]  S#[phone]

## 2021-09-08 ENCOUNTER — OFFICE VISIT (OUTPATIENT)
Dept: PHYSICAL THERAPY | Facility: CLINIC | Age: 66
End: 2021-09-08
Payer: COMMERCIAL

## 2021-09-08 ENCOUNTER — OFFICE VISIT (OUTPATIENT)
Dept: OCCUPATIONAL THERAPY | Facility: CLINIC | Age: 66
End: 2021-09-08
Payer: COMMERCIAL

## 2021-09-08 DIAGNOSIS — M54.2 CERVICALGIA: ICD-10-CM

## 2021-09-08 DIAGNOSIS — R26.89 IMBALANCE: ICD-10-CM

## 2021-09-08 DIAGNOSIS — S06.0X9D CONCUSSION WITH LOSS OF CONSCIOUSNESS, SUBSEQUENT ENCOUNTER: Primary | ICD-10-CM

## 2021-09-08 DIAGNOSIS — R42 DIZZINESS: ICD-10-CM

## 2021-09-08 DIAGNOSIS — G44.309 HEADACHE AS LATE EFFECT OF BRAIN INJURY (HCC): ICD-10-CM

## 2021-09-08 DIAGNOSIS — S06.0X9A CONCUSSION WITH LOSS OF CONSCIOUSNESS, INITIAL ENCOUNTER: ICD-10-CM

## 2021-09-08 DIAGNOSIS — S06.9X0S HEADACHE AS LATE EFFECT OF BRAIN INJURY (HCC): ICD-10-CM

## 2021-09-08 PROCEDURE — 97110 THERAPEUTIC EXERCISES: CPT | Performed by: PHYSICAL THERAPIST

## 2021-09-08 PROCEDURE — 97112 NEUROMUSCULAR REEDUCATION: CPT | Performed by: PHYSICAL THERAPIST

## 2021-09-08 PROCEDURE — 97530 THERAPEUTIC ACTIVITIES: CPT | Performed by: OCCUPATIONAL THERAPIST

## 2021-09-08 NOTE — PROGRESS NOTES
Re-Eval/Daily Note     Today's date: 2021  Patient name: Willy Fernandez  : 1955  MRN: [id number]  Referring provider: Ramesh Nickerson DO  Dx:   Encounter Diagnosis   Name Primary?  Concussion with loss of consciousness, subsequent encounter Yes       Start Time: 1100  Stop Time: 1145  Total time in clinic (min): 45 minutes    Precautions: HTN, possible previous concussions  Visit 1 of [facility]  PN due 10/8  POC valid 10/29/21      Active Problem List:   Patient Active Problem List   Diagnosis    Essential hypertension    Osteoarthritis of right knee    S/P total knee arthroplasty, right    Dyspnea    Chronic cough     Past Medical Hx:   Past Medical History:   Diagnosis Date    Hypertension      Past Surgical Hx:   Past Surgical History:   Procedure Laterality Date    HERNIA REPAIR      ROTATOR CUFF REPAIR      SPLENECTOMY, PARTIAL            SKILLED ANALYSIS:  Pt seen for OT re-evaluation  Pt continues to demonstrate oculomotor deficits, specifically for convergence with score of 37/60 on the CISS, and misalignment and suppression of near vision  Pt also continues to demonstrate and report decreased processing speed and difficulty with following multi-step directions  Pt would benefit from continued OT services 2x/week for 4 weeks to continue addressing these deficits for improved ability to participate in IADLs and return to driving and work if cleared  Subjective    Pt reports that he feels improvement with balance when initially standing  Reports he feels no change in vision, and that he has an appointment with neuro optometry  Reports it takes a long time to process information  Reports that he frequently closes his R eye to see clearly  PMH:   Past Medical History:   Diagnosis Date    Hypertension        Past Surgical Hx:   Past Surgical History:   Procedure Laterality Date    HERNIA REPAIR      ROTATOR CUFF REPAIR      SPLENECTOMY, PARTIAL          Pain Levels:   2-3/10 HA  HA did not improve or worsen throughout session  Objective   IMPAIRMENTS SECTION:  Convergence Insufficiency Symptom Survey: pt scored 37/60 indicating convergence insufficiency       1 CONCUSSION COGNITIVE CHECKLIST:  *Patient indicated that he is experiencing the following symptoms:     · Memory: 3/6  · Remembering names  ? Remembering schedule  ? Sequencing activities     · Attention: 3/4  · Focusing/concetrating on a specific task  · Sustaining attention on a task  · Dividing attention     · Processin/2  ? Responding to questions in a timely manner     · Executive Functions: 3/6  ? Organizing/planning written work, emails and/or daily tasks  ? Initiating tasks  ? Setting goals     · Communication:   ? Word finding in conversation  ? Expressing your thoughts and ideas fluently     · Visual: 1/3  ? Losing spot on the page when reading     · Emotional:   ? Monitoring mood  ? Frustration tolerance  ? Sleep changes  ? Keeping cognitive and physical pace     Increased Sensitivities to: Lighting, noise, crowds, computer screen time/movies/tv     Contextual Memory Test:    Delayed recall: 8/20     Vision Screen: GLASSES (prescription)     Visual acuity, near: Binocular 20/20     Binocularity, far: inconsistent exotropia and orthophoria; excessive blinking noted     Binocularity, near: orthophoria and very mild exotropia     Convergence: 11" inches  Dec OS teaming              Frederic string: misalignment L eye; near suppression Y     Pursuits: slightly jerky horiontally     Saccades: accurate     Range of Motion: WFL     Visual perceptual midline: WNL        Convergence Insufficiency Symptom Survey (CISS): 37/60 suggesting convergence insufficiency      GOALS:     Short Term:     ? Pt will increase verbal and written 2 step direction following in semi modal environment with processing time of <2 min and 80% accuracy for improved work performance, once returned 4 weeks   PROGRESSING- continue     ? Pt will demo G recall of 60% of information utilizing memory strategy of choice for improved STM/delayed memory  PROGRESSING- continue     ? Pt will maintain attention to task for 10 minutes in semi modal environment for baseline performance,  improved role performance and to improve learning and to simulate return to work/life environment  PARTIALLY ACHIEVED- continue     ? Pt will demonstrate improved recall by accurately recalling at least 10 items on delayed portion of CMT  PROGRESSING- continue     ? Pt will demo ability to participate in dual tasking/divided attention task with 60% accuracy in semi modal environment to simulate return to work/life roles  PROGRESSING- continue     § Pt will increase oculomotor control for improved saccades, pursuits, con/divergent tasks for improved reading, board to table tasks with 60% accuracy  with minimal increase in symptoms PARTIALLY ACHIEVED- continue        LTGs 8 weeks:      ? Pt will increase verbal and written 2 step direction following in multimodal environment with processing time of <2 min and 80% accuracy for improved work performance, once returned     ? Pt will demo G recall of 80% of information utilizing memory strategy of choice for improved STM/delayed memory      ? Pt will maintain attention to task for 10 minutes in multi modal environment for baseline performance,  improved role performance and to improve learning and to simulate return to work/life environment     ? Pt will demonstrate improved recall by accurately recalling at least 12 items on delayed portion of CMT       ? Pt will demo ability to participate in dual tasking/divided attention task with 80% accuracy in multi modal environment to simulate return to work/life roles     § Pt will increase oculomotor control for improved saccades, pursuits, con/divergent tasks for improved reading, board to table tasks with 75% accuracy  with minimal increase in symptoms     § Pt will demonstrate G carryover of schedule utilization for returning to daily routines/cognitive/visually stimulating tasks for improved work performance with min inc of symptoms (2 level of less) in HA/dizziness/nausea     § Pt will increase oculomotor control for improved dynamic activities with head turns, screen to table tasks symptoms free with 75% accuracy for improved life and work roles               PLANNED THERAPY INTERVENTIONS:    Internal and external memory aides  Multimatrix for saccades/ visual clutter/attention  Hypersensitivity strategies education  Multi-modal environment  Sustained/alternating/divided attention  Tracking tube  Oculomotor control:  saccades, con/divergence  Conv /div  Dynamic tasks  Work stations with timed transitions  Temporal Awareness: Organize the Hour activities  Memory and mental manipulation  Auditory processing with immediate recall  Memory retention with immediate and delayed recall  Edu on cog/vision apps  Jyoti bryant scanning sheets          Plan: Continued skilled OT 2x/week for 4 weeks

## 2021-09-08 NOTE — PROGRESS NOTES
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 ([facility] [address] )  G44 309     S06 9X0S    6  Concussion with loss of consciousness, initial encounter  S06  0X9A                   Subjective: Patient arrives for PT from OT treatment, reports no changes since last session      Objective: See treatment diary below  PT verbal and tactile cues for technique and progression  TE:  - UT Stretch: 2 reps, 30 sec B/L  - LV Stretch: 2 reps, 30 sec B/L  - Chin tucks w/ cervical extension: 1 set, 10 reps, 10 sec hold       NMR:    FGA-       Baseline HR: 78 bpm  Baseline HA/dizziness: 2/10  HR Max: 220-25=720  60% - 91 2 bpm  80% - 121 6 bpm    - Stationary Bike (patient preferrred)- >= 80 RPM  0-4 minutes L5 RPE 2-3 H 2/10, D 2/10  4-8 minutes L7 RPE 2-3 H 0/10, D 0/10  8- 10 minutes L9 RPE 4 H 0/10, D 0/10  10-12 minutes L9 RPE 4-5 H 0/10, D 0/10  12- 14 minutes L9 RPE 5  H 0/10, D 0/10  14-16 minutes L9 RPE 5  H 0/10, D 0/10  16-20 minutes: L3 RPE 2-3 (cooldown)   Total 20 minutes         Assessment: Patient tolerated treatment well  Patient able to begin with cervical stretches and retractions to reduce symptom level and improve overall mobility  He was able to continue today with stationary bike today, reported symptom reduction after 4 minutes of riding  Completed FGA today with patient having most difficulty during ambulation with head turns and ambulation with EC  His FGA score of 18/30 suggests that he is at high risk of falls   He will continue to benefit from skilled PT services to maximize his level of function and reduce his symptoms         Plan: Continue per plan of care        Outcome Measures Initial Eval   PN  8/30/2021       mCTSIB  - FTEO (firm)  - FTEC (firm)  - FTEO (foam)  - FTEC (foam)     30 sec  30 sec  30 sec  30 sec       DGI /24        FGA /30-        GAYLE  Errors        DHI /100        PSSS /22  /132        5xSTS 24 62 sec 16 1 sec       TUG 20 97 sec 13 88 sec       6 MWT  1196 ft       10 MWT  1 12 m/s

## 2021-09-10 ENCOUNTER — OFFICE VISIT (OUTPATIENT)
Dept: OBGYN CLINIC | Facility: CLINIC | Age: 66
End: 2021-09-10
Payer: COMMERCIAL

## 2021-09-10 ENCOUNTER — OFFICE VISIT (OUTPATIENT)
Dept: NEUROSURGERY | Facility: CLINIC | Age: 66
End: 2021-09-10
Payer: COMMERCIAL

## 2021-09-10 VITALS
HEART RATE: 68 BPM | RESPIRATION RATE: 16 BRPM | TEMPERATURE: 97.9 F | DIASTOLIC BLOOD PRESSURE: 98 MMHG | BODY MASS INDEX: 30.78 KG/M2 | SYSTOLIC BLOOD PRESSURE: 170 MMHG | HEIGHT: 70 IN | WEIGHT: 215 LBS

## 2021-09-10 VITALS
WEIGHT: 215.6 LBS | SYSTOLIC BLOOD PRESSURE: 157 MMHG | DIASTOLIC BLOOD PRESSURE: 88 MMHG | HEART RATE: 61 BPM | BODY MASS INDEX: 30.86 KG/M2 | HEIGHT: 70 IN

## 2021-09-10 DIAGNOSIS — G91.2 NPH (NORMAL PRESSURE HYDROCEPHALUS) (HCC): ICD-10-CM

## 2021-09-10 DIAGNOSIS — S42.022A DISPLACED FRACTURE OF SHAFT OF LEFT CLAVICLE, INITIAL ENCOUNTER FOR CLOSED FRACTURE: ICD-10-CM

## 2021-09-10 PROCEDURE — 99243 OFF/OP CNSLTJ NEW/EST LOW 30: CPT | Performed by: NEUROLOGICAL SURGERY

## 2021-09-10 PROCEDURE — 99214 OFFICE O/P EST MOD 30 MIN: CPT | Performed by: ORTHOPAEDIC SURGERY

## 2021-09-10 RX ORDER — SODIUM CHLORIDE 1000 MG
1 TABLET, SOLUBLE MISCELLANEOUS 2 TIMES DAILY
COMMUNITY

## 2021-09-10 RX ORDER — LANOLIN ALCOHOL/MO/W.PET/CERES
CREAM (GRAM) TOPICAL 2 TIMES DAILY
COMMUNITY

## 2021-09-10 RX ORDER — CHOLECALCIFEROL (VITAMIN D3) 125 MCG
CAPSULE ORAL
COMMUNITY

## 2021-09-10 RX ORDER — CHLORAL HYDRATE 500 MG
CAPSULE ORAL DAILY
COMMUNITY

## 2021-09-10 RX ORDER — IBUPROFEN 600 MG/1
600 TABLET ORAL EVERY 6 HOURS
COMMUNITY
Start: 2021-06-08 | End: 2022-07-27
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
## 2021-09-10 NOTE — LETTER
September 10, 2021     DO [phone] [id number]    Patient: Aylin Rosas   YOB: 1955   Date of Visit: 9/10/2021     Dear Dr Nelly Lopez      Thank you for referring Jacki Raphael to me for evaluation  Below are the relevant portions of my assessment and plan of care  If you have questions, please do not hesitate to call me  I look forward to following Karen Hudson along with you           Sincerely,        Brayden Jenkins MD        CC: No Recipients    Progress Notes:
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
## 2021-09-13 ENCOUNTER — OFFICE VISIT (OUTPATIENT)
Dept: PHYSICAL THERAPY | Facility: CLINIC | Age: 66
End: 2021-09-13
Payer: COMMERCIAL

## 2021-09-13 ENCOUNTER — TELEPHONE (OUTPATIENT)
Dept: NEUROLOGY | Facility: CLINIC | Age: 66
End: 2021-09-13

## 2021-09-13 ENCOUNTER — OFFICE VISIT (OUTPATIENT)
Dept: OCCUPATIONAL THERAPY | Facility: CLINIC | Age: 66
End: 2021-09-13
Payer: COMMERCIAL

## 2021-09-13 DIAGNOSIS — M54.2 CERVICALGIA: ICD-10-CM

## 2021-09-13 DIAGNOSIS — S06.0X9D CONCUSSION WITH LOSS OF CONSCIOUSNESS, SUBSEQUENT ENCOUNTER: Primary | ICD-10-CM

## 2021-09-13 DIAGNOSIS — R26.89 IMBALANCE: ICD-10-CM

## 2021-09-13 DIAGNOSIS — R42 DIZZINESS: ICD-10-CM

## 2021-09-13 DIAGNOSIS — G44.309 HEADACHE AS LATE EFFECT OF BRAIN INJURY (HCC): ICD-10-CM

## 2021-09-13 DIAGNOSIS — S06.9X0S HEADACHE AS LATE EFFECT OF BRAIN INJURY (HCC): ICD-10-CM

## 2021-09-13 DIAGNOSIS — S06.0X9A CONCUSSION WITH LOSS OF CONSCIOUSNESS, INITIAL ENCOUNTER: ICD-10-CM

## 2021-09-13 PROCEDURE — 97112 NEUROMUSCULAR REEDUCATION: CPT | Performed by: OCCUPATIONAL THERAPIST

## 2021-09-13 PROCEDURE — 97530 THERAPEUTIC ACTIVITIES: CPT | Performed by: OCCUPATIONAL THERAPIST

## 2021-09-13 PROCEDURE — 97112 NEUROMUSCULAR REEDUCATION: CPT | Performed by: PHYSICAL THERAPIST

## 2021-09-13 NOTE — PROGRESS NOTES
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 ([facility] [address] )  G44 309     S06 9X0S    6  Concussion with loss of consciousness, initial encounter  S06  0X9A                   Subjective: Patient arrives for PT from OT treatment, reports no changes since last session with headache of 3/10 dizziness 2-3/10  Saw Neuro Surgeon who recommended to go back to neuro MD  PT educated patient to stop by neuro office secondary to follow up session 11-11 with neuro to be seen sooner  Objective: See treatment diary below  PT verbal and tactile cues for technique and progression  TE:  - UT Stretch: 2 reps, 30 sec B/L  - LV Stretch: 2 reps, 30 sec B/L  - Chin tucks w/ cervical extension: 1 set, 10 reps, 10 sec hold     Baseline HR: 78 bpm  Baseline HA/dizziness: 2/10  HR Max: 220-45=057  60% - 91 2 bpm  80% - 121 6 bpm    - Stationary Bike (patient preferrred)- >= 80 RPM  0-4 minutes L5 RPE 2-3 H 2/10, D 2/10  4-8 minutes L7 RPE 2-3 H 0/10, D 0/10  8- 10 minutes L9 RPE 4 H 0/10, D 0/10  10-12 minutes L9 RPE 4-5 H 0/10, D 0/10  12- 14 minutes L9 RPE 5  H 0/10, D 0/10  14-16 minutes L9 RPE 5  H 0/10, D 0/10  16-20 minutes: L9 RPE 5 H: 2/10, D 0/10   20-23 minutes: L4 RPE 2-3: H: 0/10, D 0/10   Total 23 minutes         Assessment: Patient tolerated treatment well with noted continued reduction in symptoms of H and D reducing to 0/10 during session  Patient was eduated to start biking by home and short drive due to symptoms usually 2-3/10  Patient has reports doing little drives in town with no issues  As well as short biking around home   Notes post biking headaches and dizziness/ goes away secondary to physiological exercise is a key component of reducing patient symptoms  Encourage to conduct home self  He will continue to benefit from skilled PT services to maximize his level of function and reduce his symptoms         Plan: Continue per plan of care  beginning balance exercises         Outcome Measures Initial Eval   PN  8/30/2021       mCTSIB  - FTEO (firm)  - FTEC (firm)  - FTEO (foam)  - FTEC (foam)     30 sec  30 sec  30 sec  30 sec       DGI /24        FGA /30-        GAYLE  Errors        DHI /100        PSSS /22  /132        5xSTS 24 62 sec 16 1 sec       TUG 20 97 sec 13 88 sec       6 MWT  1196 ft       10 MWT  1 12 m/s

## 2021-09-13 NOTE — TELEPHONE ENCOUNTER
Patient came into the office stating that he had a consult with neurosurgery and they will not do any surgical intervention at this time  He is wondering if he should see you sooner than his 11/11/21 appt  Please advise       Patient c/b # - [phone]

## 2021-09-13 NOTE — PROGRESS NOTES
Daily Note     Today's date: 2021  Patient name: Aylin Rosas  : 1955  MRN: [id number]  Referring provider: Michelle Mak DO  Dx:   Encounter Diagnosis   Name Primary?  Concussion with loss of consciousness, subsequent encounter Yes       Start Time: 930  Stop Time:   Total time in clinic (min): 45 minutes    Precautions: HTN, possible previous concussions, balance deficits trixie  when initially standing  Visit 2   PN due 10/8  POC valid 10/29/21    Subjective: Pt reports 2/10 HA at start of session  Objective: See treatment below  Attempted reyna string exercises, however L eye not teaming and pt reporting seeing I at about 20"  Monocular taping x3 minutes each eye while completed slant board-table top Spot It to improve OM strength  Binocular peripheral taping x3 minutes during same activity to improve convergence  Table top-slant board pixy cubes completed without taping focusing on convergence and accomodation  Pt required problem solving cues for pattern matching  Assessment: Tolerated treatment well  Pt consistently reported HA 2/10 throughout session  Pt would benefit from continued OT services  Plan: Continued skilled OT per POC

## 2021-09-15 ENCOUNTER — OFFICE VISIT (OUTPATIENT)
Dept: OCCUPATIONAL THERAPY | Facility: CLINIC | Age: 66
End: 2021-09-15
Payer: COMMERCIAL

## 2021-09-15 ENCOUNTER — OFFICE VISIT (OUTPATIENT)
Dept: PHYSICAL THERAPY | Facility: CLINIC | Age: 66
End: 2021-09-15
Payer: COMMERCIAL

## 2021-09-15 DIAGNOSIS — G44.309 HEADACHE AS LATE EFFECT OF BRAIN INJURY (HCC): ICD-10-CM

## 2021-09-15 DIAGNOSIS — M54.2 CERVICALGIA: ICD-10-CM

## 2021-09-15 DIAGNOSIS — S06.9X0S HEADACHE AS LATE EFFECT OF BRAIN INJURY (HCC): ICD-10-CM

## 2021-09-15 DIAGNOSIS — R26.89 IMBALANCE: ICD-10-CM

## 2021-09-15 DIAGNOSIS — S06.0X9D CONCUSSION WITH LOSS OF CONSCIOUSNESS, SUBSEQUENT ENCOUNTER: Primary | ICD-10-CM

## 2021-09-15 DIAGNOSIS — R42 DIZZINESS: ICD-10-CM

## 2021-09-15 PROCEDURE — 97530 THERAPEUTIC ACTIVITIES: CPT

## 2021-09-15 PROCEDURE — 97110 THERAPEUTIC EXERCISES: CPT

## 2021-09-15 PROCEDURE — 97112 NEUROMUSCULAR REEDUCATION: CPT

## 2021-09-15 NOTE — PROGRESS NOTES
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 ([facility] [address] )  G44 309     S06 9X0S                   Subjective: Patient arrives for today with HA of 1-2/10 dizziness 2/10  Patient reached out to neuro MD  Pt contacted neuro MD to move up appointment, but has not heard back yet  Patient reports 5/10 HA yesterday, he states he was unable to leave the house yesterday due this  Patient reports driving to session today from home with no aggravation of concussion symptoms  Objective: See treatment diary below  PT verbal and tactile cues for technique and progression      TE:  - UT Stretch: 2 reps, 30 sec B/L  - LV Stretch: 2 reps, 30 sec B/L  - Chin tucks w/ cervical extension: 1 set, 10 reps, 10 sec hold     NMR  - Dynamic gait drills: (H/V head turns, speed change, ramp navigation): 2 laps each    Head turns H + V : HA 2/10, D 4/10    Speed change: HA 2/10, D 3/10    Ramp: HA 2/10, D 3/10      Baseline HR: 78 bpm  Baseline HA/dizziness: 2/10  HR Max: 220-02=282  60% - 91 2 bpm  80% - 121 6 bpm    - Stationary Bike (patient preferrred)- >= 80 RPM  0-4 minutes L5 RPE 2-3 H 2/10, D 2/10  4-8 minutes L7 RPE 4 H 2/10, D 2/10  8- 10 minutes L10 RPE 4 H 0/10, D 1/10  10-12 minutes L 12 RPE 4-5 H 1/10, D 1/10  12- 14 minutes L12 RPE 5  H 1/10, D 0/10  14-16 minutes L12 RPE 5-6  H 1/10, D 0/10  16-18 minutes: L10 RPE 5 H: 1/10, D 0/10   18-20 minutes: L7 RPE 4 H: 0-/10, D 0/10   20-21 minutes: L5 RPE 2-3: H: 1/10, D 0/10   21-23 minutes: L1 RPE 2: H: /10, D 0/10   Total 23 minutes     End of session: H 1/10, D 1/10       Assessment: Patient tolerated treatment well with noted continued reduction in symptoms of H and D reducing to 0-1/10 during session  Incorporation of dynamic gait drills in order to continue to habituate patient to a variety of household and community environments to increase quality of life and return to independence with ADL's  Patient was encouraged to continue to perform activities which he has struggled with in the past at home to increase tolerance to a variety of activities  Patient educated to perform all activities in therapeutic range (increase no more than 3 point on the 0-10 scale) and in a safe manner, patient verbalized understanding  Continues to demonstrate reduction of headache/ dizziness both during and after biking/psychologic  exercise, this continues to be a key component of reducing patient symptoms  He will continue to benefit from skilled PT services to maximize his level of function and reduce his symptoms         Plan: Continue per plan of care  beginning balance exercises         Outcome Measures Initial Eval   PN  8/30/2021       mCTSIB  - FTEO (firm)  - FTEC (firm)  - FTEO (foam)  - FTEC (foam)     30 sec  30 sec  30 sec  30 sec       DGI /24        FGA /30-        GAYLE  Errors        DHI /100        PSSS /22  /132        5xSTS 24 62 sec 16 1 sec       TUG 20 97 sec 13 88 sec       6 MWT  1196 ft       10 MWT  1 12 m/s

## 2021-09-15 NOTE — PROGRESS NOTES
Daily Note     Today's date: 9/15/2021  Patient name: Naomi Case  : 1955  MRN: [id number]  Referring provider: Octavio Glover, DO  Dx:   Encounter Diagnosis   Name Primary?  Concussion with loss of consciousness, subsequent encounter Yes       Start Time: 1103  Stop Time: 1145  Total time in clinic (min): 42 minutes    Precautions: HTN, possible previous concussions, balance deficits trixie  when initially standing  Visit 3  PN due 10/8  POC valid 10/29/21    Subjective: Pt reports 2/10 HA at start of session, inc to 310 during session      Objective:     -Combine the figures worksheet with models on L side of table and possible answers on R side of table to facilitate horizontal scanning  Additional focus on sustained attn in semi modal env,   Requires 1 cue for problem solving  Requires self correction throughout, often looking at wrong row of answers    -Count the items worksheet with focus on figure ground, visual scanning in all directions in visually congested environment  Externally distracted, reports he now keeps ear plugs in his pocket to deal with louder environments  He is sensitive to certain pitches and voices  Discussed habituation and avoiding use of ear plugs and sunglasses to avoid inc hypersensitivity  Requires significantly inc time to locate items  HA inc to 310    -Number/letter decoding with code spread throughout wide vertical surface with focus on visual scanning in all directions, head turns, sustained attn in semi modal environment  Requires significantly inc time, mild dizziness following activity and unsteady during ambulation  Assessment: Tolerated treatment well  Mild inc in HA and dizziness during session  Would benefit from inc focus on sustained attention in multi modal environments and inc tolerance to visual and auditory stimuli  Pt would benefit from continued OT services  Plan: Continued skilled OT per POC

## 2021-09-20 ENCOUNTER — OFFICE VISIT (OUTPATIENT)
Dept: PHYSICAL THERAPY | Facility: CLINIC | Age: 66
End: 2021-09-20
Payer: COMMERCIAL

## 2021-09-20 ENCOUNTER — OFFICE VISIT (OUTPATIENT)
Dept: OCCUPATIONAL THERAPY | Facility: CLINIC | Age: 66
End: 2021-09-20
Payer: COMMERCIAL

## 2021-09-20 DIAGNOSIS — R42 DIZZINESS: ICD-10-CM

## 2021-09-20 DIAGNOSIS — S06.0X9D CONCUSSION WITH LOSS OF CONSCIOUSNESS, SUBSEQUENT ENCOUNTER: Primary | ICD-10-CM

## 2021-09-20 DIAGNOSIS — G44.309 HEADACHE AS LATE EFFECT OF BRAIN INJURY (HCC): ICD-10-CM

## 2021-09-20 DIAGNOSIS — R26.89 IMBALANCE: ICD-10-CM

## 2021-09-20 DIAGNOSIS — M54.2 CERVICALGIA: ICD-10-CM

## 2021-09-20 DIAGNOSIS — S06.9X0S HEADACHE AS LATE EFFECT OF BRAIN INJURY (HCC): ICD-10-CM

## 2021-09-20 PROCEDURE — 97530 THERAPEUTIC ACTIVITIES: CPT

## 2021-09-20 PROCEDURE — 97112 NEUROMUSCULAR REEDUCATION: CPT

## 2021-09-20 NOTE — PROGRESS NOTES
Daily Note     Today's date: 2021  Patient name: Doron Albert  : 1955  MRN: [id number]  Referring provider: Minerva Benitez DO  Dx:   Encounter Diagnosis   Name Primary?  Concussion with loss of consciousness, subsequent encounter Yes       Start Time: 1103  Stop Time: 1145  Total time in clinic (min): 42 minutes    Precautions: HTN, possible previous concussions, balance deficits trixie  when initially standing  Visit 4  PN due 10/8  POC valid 10/29/21    Subjective: Pt reports 0/10 HA and 1/10 dizziness at start of session    Objective:     -Figure design copy with model on vertical mirror surface 90* to pt's side and asked to create a vertical mirror image on paper anteriorly  Focus on visual memory, visual scanning to b/l sides and head turns to simulate looking in blind spot while driving, sustained attention in semi modal environment  Requires significantly inc time, demonstrates difficulty locating the middle of the page and difficulty with   Requires significantly inc time and becomes mildly frustrated  Accurately completes 1/2 during 45 min session  Sent home remainder of activity to complete at home  Assessment: Tolerated treatment fair  Improved sustained attn in semi modal environment, and no inc in HA/dizziness with complex visual and cognitive activities  Demonstrates difficulty with participating in light conversation during activity, and   Pt would benefit from continued OT services  Plan: Continued skilled OT per POC

## 2021-09-20 NOTE — PROGRESS NOTES
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 ([facility] [address] )  G44 309     S06 9X0S                   Subjective: Patient arrives for today with HA of 2/10 dizziness 0/10, no neck pain, just stiffness  Patient reports driving to session today from home with no aggravation of concussion symptoms and that continued incorporation of more activities into his day has been going okay  He reported his symptoms were aggravated on Thursday and Friday and he was unable to leave the house with improvement overall  Objective: See treatment diary below  PT verbal and tactile cues for technique and progression      TE:  - UT Stretch: 2 reps, 30 sec B/L  - LV Stretch: 2 reps, 30 sec B/L  - Chin tucks w/ cervical extension: 1 set, 10 reps, 10 sec hold     NMR  - Dynamic gait drills: (H/V head turns): 4 laps each (PT CG)    Head turns H : HA 1-2/10, D 4/10    Head turns V : HA 1-2/10, D 3/10     Baseline HR: 78 bpm  Baseline HA/dizziness: 1-2/10  HR Max: 220-66=454  60% - 91 2 bpm  80% - 121 6 bpm    - Stationary Bike (patient preferrred)- >= 80 RPM  0-4 minutes L8 [place] RPE 13; H 1/10, D 1/10  4-8 minutes L10 [place] RPE 14; H 0/10, D  5/10  8- 10 minutes L12 [place] RPE 14; H 0/10, D  5/10  10-12 minutes L14 [place] RPE 15; H 0/10, D  5/10  12- 14 minutes L14 [place] RPE 15  H 0/10, D 2/10  14-16 minutes L12 [place] RPE 15  H 0/10, D 1/10  16-18 minutes: L10 [place] RPE 15 H: 0/10, D 1/10   18-20 minutes: L8 [place] RPE 14 H: 0/10, D 1/10   20-22 minutes: L6 [place] RPE 14 H: 0/10, D 0/10  c22-24 minutes: L2 [place] RPE 11 H: 0/10, D 0/10 (RPE 60)   Total 24 minutes     End of session: H 0/10, D 1/10 Assessment: Patient tolerated treatment well with noted continued reduction in symptoms of H and D reducing to 0-1/10 during session  Patient was able to tolerate increased resistance on statinary bike with continued reduction of symptoms during physiologic exercise  Increase in headache and dizziness with dynamic gait drills indicates continued motion sensitivity deficits  Plan to incorporate more dynamic balance activities and habituation exercises to increase tolerance to different activities and improve quality of life  Patient educated to continue to perform all activities in therapeutic range (increase no more than 3 point on the 0-10 scale) and in a safe manner, patient verbalized understanding  He will continue to benefit from skilled PT services to maximize his level of function and reduce his symptoms         Plan: Continue per plan of care         Outcome Measures Initial Eval   PN  8/30/2021       mCTSIB  - FTEO (firm)  - FTEC (firm)  - FTEO (foam)  - FTEC (foam)     30 sec  30 sec  30 sec  30 sec       DGI /24        FGA /30-        GAYLE  Errors        DHI /100        PSSS /22  /132        5xSTS 24 62 sec 16 1 sec       TUG 20 97 sec 13 88 sec       6 MWT  1196 ft       10 MWT  1 12 m/s

## 2021-09-22 ENCOUNTER — OFFICE VISIT (OUTPATIENT)
Dept: PHYSICAL THERAPY | Facility: CLINIC | Age: 66
End: 2021-09-22
Payer: COMMERCIAL

## 2021-09-22 ENCOUNTER — OFFICE VISIT (OUTPATIENT)
Dept: OCCUPATIONAL THERAPY | Facility: CLINIC | Age: 66
End: 2021-09-22
Payer: COMMERCIAL

## 2021-09-22 DIAGNOSIS — S06.0X9A CONCUSSION WITH LOSS OF CONSCIOUSNESS, INITIAL ENCOUNTER: ICD-10-CM

## 2021-09-22 DIAGNOSIS — R26.89 IMBALANCE: ICD-10-CM

## 2021-09-22 DIAGNOSIS — R42 DIZZINESS: ICD-10-CM

## 2021-09-22 DIAGNOSIS — S06.0X9D CONCUSSION WITH LOSS OF CONSCIOUSNESS, SUBSEQUENT ENCOUNTER: Primary | ICD-10-CM

## 2021-09-22 DIAGNOSIS — G44.309 HEADACHE AS LATE EFFECT OF BRAIN INJURY (HCC): ICD-10-CM

## 2021-09-22 DIAGNOSIS — M54.2 CERVICALGIA: ICD-10-CM

## 2021-09-22 DIAGNOSIS — S06.9X0S HEADACHE AS LATE EFFECT OF BRAIN INJURY (HCC): ICD-10-CM

## 2021-09-22 PROCEDURE — 97530 THERAPEUTIC ACTIVITIES: CPT

## 2021-09-22 PROCEDURE — 97112 NEUROMUSCULAR REEDUCATION: CPT | Performed by: PHYSICAL THERAPIST

## 2021-09-22 PROCEDURE — 97112 NEUROMUSCULAR REEDUCATION: CPT

## 2021-09-22 NOTE — PROGRESS NOTES
Daily Note     Today's date: 2021  Patient name: Deloris Taylor  : 1955  MRN: [id number]  Referring provider: Wandy Saldana DO  Dx:   Encounter Diagnosis   Name Primary?  Concussion with loss of consciousness, subsequent encounter Yes       Start Time: 1103  Stop Time: 1145  Total time in clinic (min): 42 minutes    Precautions: HTN, possible previous concussions, balance deficits trixie  when initially standing  Visit 5  PN due 10/8  POC valid 10/29/21    Subjective: Pt reports 3/10 dizziness at start of session  Reports he had a much easier time completing HEP by placing sheets on table instead of on vertical surface to side  Objective:   -Qbitz with design on slant board with focus on visual accomodation, , and sustained attention in semi modal environment  Requires visual occlusion line by line to compensate for  deficits  Completed another model with use of peripheral vision occlusion with taping strategies to facilitate b/l eye convergence  -Spot it with monocular taping OD with focus on ocular motor strengthening OS, saccades in all directions, and working memory  Requires inc time to locate items and to recall 3-4 matches at a time  Assessment: Tolerated treatment well  Demonstrating improved sustained attn in semi modal environment and improved dizziness with use of binocular peripheral taping  Dizziness decreased from 3/10 to 2/10  Plan: Continued skilled OT per POC

## 2021-09-22 NOTE — PROGRESS NOTES
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 ([facility] [address] )  G44 309     S06 9X0S    6  Concussion with loss of consciousness, initial encounter  S06  0X9A                   Subjective: Patient arrives for today with HA of 0/10 dizziness 2/10, no neck pain, just stiffness  Notes doing activities at home and able to become symptom free  Continues to get symptoms with OT  Objective: See treatment diary below  PT verbal and tactile cues for technique and progression  TE:   - UT Stretch: 2 reps, 30 sec B/L  - LV Stretch: 2 reps, 30 sec B/L  - Chin tucks w/ cervical extension: 1 set, 10 reps, 10 sec hold     Baseline HR: 78 bpm  Baseline HA/dizziness: 1-2/10  HR Max: 220-90=883  60% - 91 2 bpm  80% - 121 6 bpm    - Stationary Bike (patient preferrred)- >= 80 RPM  0-4 minutes L8 [place]ank RPE 13; H 1/10, D 2/10  4-8 minutes L10 [place] RPE 14; H 0/10, D 2/10  8- 10 minutes L12 [place]ank RPE 14; H 0/10, D 2/10  10-12 minutes L14 [place] RPE 15; H 0/10, D 1/10  12- 14 minutes L14 [place] RPE 15  H 0/10, D 1/10  14-16 minutes L12 [place] RPE 15  H 0/10, D 1/10  16-18 minutes: L10 [place] RPE 15 H: 0/10, D 1/10   18-20 minutes: L8 [place] RPE 14 H: 0/10, D 1/10   20-22 minutes: L6 [place]ank RPE 14 H: 0/10, D 0/10  c22-24 minutes: L2 [place]ank RPE 11 H: 0/10, D 0/10 (RPE 60)   Total 24 minutes     End of session: H 0/10, D 1/10       Assessment: Patient tolerated treatment well with noted continued reduction in symptoms of H and D reducing to 0-1/10 during session   Patient educated to continue to perform all activities in therapeutic range (increase no more than 3 point on the 0-10 scale) and in a safe manner, patient verbalized understanding  Patient will be Discharged at this time following ability to perform strategies to reduce systems at home  Will resume PT for VTR once demonstrates plateau with vision therapy  Plan: Continue per plan of care         Outcome Measures Initial Eval   PN  8/30/2021       mCTSIB  - FTEO (firm)  - FTEC (firm)  - FTEO (foam)  - FTEC (foam)     30 sec  30 sec  30 sec  30 sec       DGI /24        FGA /30-        GAYLE  Errors        DHI /100        PSSS /22  /132        5xSTS 24 62 sec 16 1 sec       TUG 20 97 sec 13 88 sec       6 MWT  1196 ft       10 MWT  1 12 m/s

## 2021-09-26 DIAGNOSIS — R51.9 CHRONIC DAILY HEADACHE: ICD-10-CM

## 2021-09-26 DIAGNOSIS — F07.81 POST CONCUSSION SYNDROME: ICD-10-CM

## 2021-09-27 ENCOUNTER — OFFICE VISIT (OUTPATIENT)
Dept: OCCUPATIONAL THERAPY | Facility: CLINIC | Age: 66
End: 2021-09-27
Payer: COMMERCIAL

## 2021-09-27 ENCOUNTER — APPOINTMENT (OUTPATIENT)
Dept: PHYSICAL THERAPY | Facility: CLINIC | Age: 66
End: 2021-09-27
Payer: COMMERCIAL

## 2021-09-27 DIAGNOSIS — S06.0X9D CONCUSSION WITH LOSS OF CONSCIOUSNESS, SUBSEQUENT ENCOUNTER: Primary | ICD-10-CM

## 2021-09-27 DIAGNOSIS — R51.9 CHRONIC DAILY HEADACHE: ICD-10-CM

## 2021-09-27 DIAGNOSIS — F07.81 POST CONCUSSION SYNDROME: ICD-10-CM

## 2021-09-27 PROCEDURE — 97530 THERAPEUTIC ACTIVITIES: CPT | Performed by: OCCUPATIONAL THERAPIST

## 2021-09-27 PROCEDURE — 97112 NEUROMUSCULAR REEDUCATION: CPT | Performed by: OCCUPATIONAL THERAPIST

## 2021-09-27 RX ORDER — VERAPAMIL HYDROCHLORIDE 120 MG/1
CAPSULE, EXTENDED RELEASE ORAL
Qty: 30 CAPSULE | Refills: 2 | Status: SHIPPED | OUTPATIENT
Start: 2021-09-27 | End: 2021-12-07

## 2021-09-27 RX ORDER — VERAPAMIL HYDROCHLORIDE 120 MG/1
120 CAPSULE, EXTENDED RELEASE ORAL
Qty: 30 CAPSULE | Refills: 0 | Status: SHIPPED | OUTPATIENT
Start: 2021-09-27 | End: 2021-09-27

## 2021-09-27 NOTE — PROGRESS NOTES
Daily Note     Today's date: 2021  Patient name: Theodore Cabrera  : 1955  MRN: [id number]  Referring provider: Namita Berger DO  Dx:   Encounter Diagnosis   Name Primary?  Concussion with loss of consciousness, subsequent encounter Yes       Start Time: 1111  Stop Time: 1145  Total time in clinic (min): 34 minutes    Precautions: HTN, possible previous concussions, balance deficits trixie  when initially standing  Visit 6  PN due 10/8  POC valid 10/29/21    Subjective: Pt reports 3/10 dizziness at start of session  Reports he had a much easier time completing HEP by placing sheets on table instead of on vertical surface to side  Objective:   Detectives Looking Chart with monocular taping x5 minutes each eye, then 5 minutes peripheral taping to improve OM control for saccades, pursuits  Pixy Cubes with white lines completed with eyes uncovered, picture on slant board and cubes on L and R sides of board to address accomodation, pursuits, saccades  Assessment: Tolerated treatment well  Demonstrating improved sustained attn in semi modal environment and improved dizziness with use of binocular peripheral taping  HA 2/10 throughout session, no change in dizziness  Plan: Continued skilled OT per POC

## 2021-09-29 ENCOUNTER — OFFICE VISIT (OUTPATIENT)
Dept: OCCUPATIONAL THERAPY | Facility: CLINIC | Age: 66
End: 2021-09-29
Payer: COMMERCIAL

## 2021-09-29 ENCOUNTER — APPOINTMENT (OUTPATIENT)
Dept: PHYSICAL THERAPY | Facility: CLINIC | Age: 66
End: 2021-09-29
Payer: COMMERCIAL

## 2021-09-29 DIAGNOSIS — S06.0X9D CONCUSSION WITH LOSS OF CONSCIOUSNESS, SUBSEQUENT ENCOUNTER: Primary | ICD-10-CM

## 2021-09-29 PROCEDURE — 97112 NEUROMUSCULAR REEDUCATION: CPT

## 2021-09-29 PROCEDURE — 97530 THERAPEUTIC ACTIVITIES: CPT

## 2021-09-29 NOTE — PROGRESS NOTES
Daily Note     Today's date: 2021  Patient name: Theodore Cabrera  : 1955  MRN: [id number]  Referring provider: Namita Berger DO  Dx:   Encounter Diagnosis   Name Primary?  Concussion with loss of consciousness, subsequent encounter Yes       Start Time: 845  Stop Time: 930  Total time in clinic (min): 45 minutes    Precautions: HTN, possible previous concussions, balance deficits trixie  when initially standing  Visit 7  PN due 10/8  POC valid 10/29/21    Subjective: Pt reports 3/10 dizziness at start of session  Reports he had a much easier time completing HEP by placing sheets on table instead of on vertical surface to side  Objective:   -Trail making alt numbers/letters with items spread on window sill, window, vertical surface on b/l sides  Items with visual distraction and pt standing on blue foam pad  Focus on saccades in all directions with decreased sensory input b/l feet, with head turns incorporated and cog loading  Mild inc in dizziness/unsteadiness following activity  1 error in sequence  No LOB in stance on blue foam    -[place] block design with model on vertical surface laterally to R and above head level to facilitate change in head positioning, saccades and visual accomodation  Completed without inc in symptoms with inc time  Upgraded to model on the floor to R (symptoms reported to often be exacerbated looking at the floor) and instructed to attempt to mirror image  Requires inc time for processing and mental manipulation        Assessment: Tolerated treatment well  Mild inc in symptoms with combination of decreased b/l sensory input to soles of feet, head turns, saccades in all directions, cog loading and figure ground  Plan: Continued skilled OT per POC

## 2021-10-05 ENCOUNTER — OFFICE VISIT (OUTPATIENT)
Dept: OCCUPATIONAL THERAPY | Facility: CLINIC | Age: 66
End: 2021-10-05
Payer: COMMERCIAL

## 2021-10-05 DIAGNOSIS — S06.0X9D CONCUSSION WITH LOSS OF CONSCIOUSNESS, SUBSEQUENT ENCOUNTER: Primary | ICD-10-CM

## 2021-10-05 PROCEDURE — 97530 THERAPEUTIC ACTIVITIES: CPT | Performed by: OCCUPATIONAL THERAPIST

## 2021-10-05 PROCEDURE — 97112 NEUROMUSCULAR REEDUCATION: CPT | Performed by: OCCUPATIONAL THERAPIST

## 2021-10-06 ENCOUNTER — EVALUATION (OUTPATIENT)
Dept: OCCUPATIONAL THERAPY | Facility: CLINIC | Age: 66
End: 2021-10-06
Payer: COMMERCIAL

## 2021-10-06 DIAGNOSIS — S06.0X9D CONCUSSION WITH LOSS OF CONSCIOUSNESS, SUBSEQUENT ENCOUNTER: Primary | ICD-10-CM

## 2021-10-06 PROCEDURE — 97530 THERAPEUTIC ACTIVITIES: CPT

## 2021-10-11 ENCOUNTER — OFFICE VISIT (OUTPATIENT)
Dept: OCCUPATIONAL THERAPY | Facility: CLINIC | Age: 66
End: 2021-10-11
Payer: COMMERCIAL

## 2021-10-11 DIAGNOSIS — S06.0X9D CONCUSSION WITH LOSS OF CONSCIOUSNESS, SUBSEQUENT ENCOUNTER: Primary | ICD-10-CM

## 2021-10-11 PROCEDURE — 97112 NEUROMUSCULAR REEDUCATION: CPT | Performed by: OCCUPATIONAL THERAPIST

## 2021-10-11 PROCEDURE — 97530 THERAPEUTIC ACTIVITIES: CPT | Performed by: OCCUPATIONAL THERAPIST

## 2021-10-13 ENCOUNTER — OFFICE VISIT (OUTPATIENT)
Dept: OCCUPATIONAL THERAPY | Facility: CLINIC | Age: 66
End: 2021-10-13
Payer: COMMERCIAL

## 2021-10-13 DIAGNOSIS — S06.0X9D CONCUSSION WITH LOSS OF CONSCIOUSNESS, SUBSEQUENT ENCOUNTER: Primary | ICD-10-CM

## 2021-10-13 PROCEDURE — 97112 NEUROMUSCULAR REEDUCATION: CPT | Performed by: OCCUPATIONAL THERAPIST

## 2021-10-13 PROCEDURE — 97530 THERAPEUTIC ACTIVITIES: CPT | Performed by: OCCUPATIONAL THERAPIST

## 2021-10-20 ENCOUNTER — OFFICE VISIT (OUTPATIENT)
Dept: OCCUPATIONAL THERAPY | Facility: CLINIC | Age: 66
End: 2021-10-20
Payer: COMMERCIAL

## 2021-10-20 DIAGNOSIS — S06.0X9D CONCUSSION WITH LOSS OF CONSCIOUSNESS, SUBSEQUENT ENCOUNTER: Primary | ICD-10-CM

## 2021-10-20 PROCEDURE — 97530 THERAPEUTIC ACTIVITIES: CPT | Performed by: OCCUPATIONAL THERAPIST

## 2021-10-22 ENCOUNTER — OFFICE VISIT (OUTPATIENT)
Dept: OCCUPATIONAL THERAPY | Facility: CLINIC | Age: 66
End: 2021-10-22
Payer: COMMERCIAL

## 2021-10-22 DIAGNOSIS — S06.0X9D CONCUSSION WITH LOSS OF CONSCIOUSNESS, SUBSEQUENT ENCOUNTER: Primary | ICD-10-CM

## 2021-10-22 PROCEDURE — 97530 THERAPEUTIC ACTIVITIES: CPT | Performed by: OCCUPATIONAL THERAPIST

## 2021-10-22 PROCEDURE — 97112 NEUROMUSCULAR REEDUCATION: CPT | Performed by: OCCUPATIONAL THERAPIST

## 2021-10-25 ENCOUNTER — OFFICE VISIT (OUTPATIENT)
Dept: OCCUPATIONAL THERAPY | Facility: CLINIC | Age: 66
End: 2021-10-25
Payer: COMMERCIAL

## 2021-10-25 DIAGNOSIS — S06.0X9D CONCUSSION WITH LOSS OF CONSCIOUSNESS, SUBSEQUENT ENCOUNTER: Primary | ICD-10-CM

## 2021-10-25 PROCEDURE — 97112 NEUROMUSCULAR REEDUCATION: CPT | Performed by: OCCUPATIONAL THERAPIST

## 2021-10-27 ENCOUNTER — OFFICE VISIT (OUTPATIENT)
Dept: OCCUPATIONAL THERAPY | Facility: CLINIC | Age: 66
End: 2021-10-27
Payer: COMMERCIAL

## 2021-10-27 DIAGNOSIS — S06.0X9D CONCUSSION WITH LOSS OF CONSCIOUSNESS, SUBSEQUENT ENCOUNTER: Primary | ICD-10-CM

## 2021-10-27 PROCEDURE — 97530 THERAPEUTIC ACTIVITIES: CPT | Performed by: OCCUPATIONAL THERAPIST

## 2021-11-01 ENCOUNTER — EVALUATION (OUTPATIENT)
Dept: OCCUPATIONAL THERAPY | Facility: CLINIC | Age: 66
End: 2021-11-01
Payer: COMMERCIAL

## 2021-11-01 DIAGNOSIS — S06.0X9D CONCUSSION WITH LOSS OF CONSCIOUSNESS, SUBSEQUENT ENCOUNTER: Primary | ICD-10-CM

## 2021-11-01 PROCEDURE — 97530 THERAPEUTIC ACTIVITIES: CPT

## 2021-11-03 ENCOUNTER — EVALUATION (OUTPATIENT)
Dept: PHYSICAL THERAPY | Facility: CLINIC | Age: 66
End: 2021-11-03
Payer: COMMERCIAL

## 2021-11-03 DIAGNOSIS — R26.89 IMBALANCE: ICD-10-CM

## 2021-11-03 DIAGNOSIS — S06.0X0D CONCUSSION WITHOUT LOSS OF CONSCIOUSNESS, SUBSEQUENT ENCOUNTER: Primary | ICD-10-CM

## 2021-11-03 DIAGNOSIS — R42 DIZZINESS: ICD-10-CM

## 2021-11-03 PROCEDURE — 97164 PT RE-EVAL EST PLAN CARE: CPT | Performed by: PHYSICAL THERAPIST

## 2021-11-03 PROCEDURE — 97112 NEUROMUSCULAR REEDUCATION: CPT | Performed by: PHYSICAL THERAPIST

## 2021-11-08 ENCOUNTER — APPOINTMENT (OUTPATIENT)
Dept: OCCUPATIONAL THERAPY | Facility: CLINIC | Age: 66
End: 2021-11-08
Payer: COMMERCIAL

## 2021-11-09 ENCOUNTER — OFFICE VISIT (OUTPATIENT)
Dept: PHYSICAL THERAPY | Facility: CLINIC | Age: 66
End: 2021-11-09
Payer: COMMERCIAL

## 2021-11-09 DIAGNOSIS — S06.0X0D CONCUSSION WITHOUT LOSS OF CONSCIOUSNESS, SUBSEQUENT ENCOUNTER: Primary | ICD-10-CM

## 2021-11-09 DIAGNOSIS — R42 DIZZINESS: ICD-10-CM

## 2021-11-09 DIAGNOSIS — R26.89 IMBALANCE: ICD-10-CM

## 2021-11-09 PROCEDURE — 97112 NEUROMUSCULAR REEDUCATION: CPT

## 2021-11-09 PROCEDURE — 97110 THERAPEUTIC EXERCISES: CPT

## 2021-11-10 ENCOUNTER — APPOINTMENT (OUTPATIENT)
Dept: OCCUPATIONAL THERAPY | Facility: CLINIC | Age: 66
End: 2021-11-10
Payer: COMMERCIAL

## 2021-11-11 ENCOUNTER — OFFICE VISIT (OUTPATIENT)
Dept: NEUROLOGY | Facility: CLINIC | Age: 66
End: 2021-11-11
Payer: COMMERCIAL

## 2021-11-11 VITALS
DIASTOLIC BLOOD PRESSURE: 81 MMHG | HEIGHT: 70 IN | BODY MASS INDEX: 31.92 KG/M2 | TEMPERATURE: 97.4 F | WEIGHT: 223 LBS | HEART RATE: 61 BPM | SYSTOLIC BLOOD PRESSURE: 134 MMHG

## 2021-11-11 DIAGNOSIS — R51.9 CHRONIC HEADACHES: Primary | ICD-10-CM

## 2021-11-11 DIAGNOSIS — R26.89 BALANCE PROBLEMS: ICD-10-CM

## 2021-11-11 DIAGNOSIS — G89.29 CHRONIC HEADACHES: Primary | ICD-10-CM

## 2021-11-11 DIAGNOSIS — Z87.820 HISTORY OF CONCUSSION: ICD-10-CM

## 2021-11-11 PROCEDURE — 99215 OFFICE O/P EST HI 40 MIN: CPT | Performed by: PSYCHIATRY & NEUROLOGY

## 2021-11-11 RX ORDER — AMITRIPTYLINE HYDROCHLORIDE 25 MG/1
TABLET, FILM COATED ORAL
Qty: 60 TABLET | Refills: 4 | Status: SHIPPED | OUTPATIENT
Start: 2021-11-11 | End: 2022-02-09

## 2021-11-12 ENCOUNTER — OFFICE VISIT (OUTPATIENT)
Dept: PHYSICAL THERAPY | Facility: CLINIC | Age: 66
End: 2021-11-12
Payer: COMMERCIAL

## 2021-11-12 DIAGNOSIS — S06.0X0D CONCUSSION WITHOUT LOSS OF CONSCIOUSNESS, SUBSEQUENT ENCOUNTER: Primary | ICD-10-CM

## 2021-11-12 DIAGNOSIS — R42 DIZZINESS: ICD-10-CM

## 2021-11-12 DIAGNOSIS — R26.89 IMBALANCE: ICD-10-CM

## 2021-11-12 PROCEDURE — 97112 NEUROMUSCULAR REEDUCATION: CPT | Performed by: PHYSICAL THERAPIST

## 2021-11-12 PROCEDURE — 97110 THERAPEUTIC EXERCISES: CPT | Performed by: PHYSICAL THERAPIST

## 2021-11-15 ENCOUNTER — APPOINTMENT (OUTPATIENT)
Dept: OCCUPATIONAL THERAPY | Facility: CLINIC | Age: 66
End: 2021-11-15
Payer: COMMERCIAL

## 2021-11-16 ENCOUNTER — OFFICE VISIT (OUTPATIENT)
Dept: PHYSICAL THERAPY | Facility: CLINIC | Age: 66
End: 2021-11-16
Payer: COMMERCIAL

## 2021-11-16 DIAGNOSIS — R26.89 IMBALANCE: ICD-10-CM

## 2021-11-16 DIAGNOSIS — R42 DIZZINESS: ICD-10-CM

## 2021-11-16 DIAGNOSIS — S06.0X0D CONCUSSION WITHOUT LOSS OF CONSCIOUSNESS, SUBSEQUENT ENCOUNTER: Primary | ICD-10-CM

## 2021-11-16 PROCEDURE — 97530 THERAPEUTIC ACTIVITIES: CPT | Performed by: PHYSICAL THERAPIST

## 2021-11-16 PROCEDURE — 97112 NEUROMUSCULAR REEDUCATION: CPT | Performed by: PHYSICAL THERAPIST

## 2021-11-17 ENCOUNTER — APPOINTMENT (OUTPATIENT)
Dept: OCCUPATIONAL THERAPY | Facility: CLINIC | Age: 66
End: 2021-11-17
Payer: COMMERCIAL

## 2021-11-18 ENCOUNTER — OFFICE VISIT (OUTPATIENT)
Dept: PHYSICAL THERAPY | Facility: CLINIC | Age: 66
End: 2021-11-18
Payer: COMMERCIAL

## 2021-11-18 DIAGNOSIS — R42 DIZZINESS: ICD-10-CM

## 2021-11-18 DIAGNOSIS — R26.89 IMBALANCE: ICD-10-CM

## 2021-11-18 DIAGNOSIS — S06.0X0D CONCUSSION WITHOUT LOSS OF CONSCIOUSNESS, SUBSEQUENT ENCOUNTER: Primary | ICD-10-CM

## 2021-11-18 PROCEDURE — 97530 THERAPEUTIC ACTIVITIES: CPT

## 2021-11-18 PROCEDURE — 97112 NEUROMUSCULAR REEDUCATION: CPT

## 2021-11-22 ENCOUNTER — OFFICE VISIT (OUTPATIENT)
Dept: PHYSICAL THERAPY | Facility: CLINIC | Age: 66
End: 2021-11-22
Payer: COMMERCIAL

## 2021-11-22 ENCOUNTER — APPOINTMENT (OUTPATIENT)
Dept: OCCUPATIONAL THERAPY | Facility: CLINIC | Age: 66
End: 2021-11-22
Payer: COMMERCIAL

## 2021-11-22 DIAGNOSIS — S06.0X0D CONCUSSION WITHOUT LOSS OF CONSCIOUSNESS, SUBSEQUENT ENCOUNTER: Primary | ICD-10-CM

## 2021-11-22 DIAGNOSIS — R42 DIZZINESS: ICD-10-CM

## 2021-11-22 DIAGNOSIS — R26.89 IMBALANCE: ICD-10-CM

## 2021-11-22 PROCEDURE — 97112 NEUROMUSCULAR REEDUCATION: CPT | Performed by: PHYSICAL THERAPIST

## 2021-11-24 ENCOUNTER — OFFICE VISIT (OUTPATIENT)
Dept: PHYSICAL THERAPY | Facility: CLINIC | Age: 66
End: 2021-11-24
Payer: COMMERCIAL

## 2021-11-24 ENCOUNTER — APPOINTMENT (OUTPATIENT)
Dept: OCCUPATIONAL THERAPY | Facility: CLINIC | Age: 66
End: 2021-11-24
Payer: COMMERCIAL

## 2021-11-24 DIAGNOSIS — S06.0X0D CONCUSSION WITHOUT LOSS OF CONSCIOUSNESS, SUBSEQUENT ENCOUNTER: Primary | ICD-10-CM

## 2021-11-24 DIAGNOSIS — R26.89 IMBALANCE: ICD-10-CM

## 2021-11-24 DIAGNOSIS — R42 DIZZINESS: ICD-10-CM

## 2021-11-24 PROCEDURE — 97112 NEUROMUSCULAR REEDUCATION: CPT

## 2021-11-29 ENCOUNTER — OFFICE VISIT (OUTPATIENT)
Dept: PHYSICAL THERAPY | Facility: CLINIC | Age: 66
End: 2021-11-29
Payer: COMMERCIAL

## 2021-11-29 ENCOUNTER — APPOINTMENT (OUTPATIENT)
Dept: OCCUPATIONAL THERAPY | Facility: CLINIC | Age: 66
End: 2021-11-29
Payer: COMMERCIAL

## 2021-11-29 DIAGNOSIS — S06.0X0D CONCUSSION WITHOUT LOSS OF CONSCIOUSNESS, SUBSEQUENT ENCOUNTER: Primary | ICD-10-CM

## 2021-11-29 DIAGNOSIS — R42 DIZZINESS: ICD-10-CM

## 2021-11-29 DIAGNOSIS — R26.89 IMBALANCE: ICD-10-CM

## 2021-11-29 PROCEDURE — 97112 NEUROMUSCULAR REEDUCATION: CPT

## 2021-12-01 ENCOUNTER — EVALUATION (OUTPATIENT)
Dept: PHYSICAL THERAPY | Facility: CLINIC | Age: 66
End: 2021-12-01
Payer: COMMERCIAL

## 2021-12-01 ENCOUNTER — APPOINTMENT (OUTPATIENT)
Dept: OCCUPATIONAL THERAPY | Facility: CLINIC | Age: 66
End: 2021-12-01
Payer: COMMERCIAL

## 2021-12-01 DIAGNOSIS — R42 DIZZINESS: ICD-10-CM

## 2021-12-01 DIAGNOSIS — R26.89 IMBALANCE: ICD-10-CM

## 2021-12-01 DIAGNOSIS — S06.0X0D CONCUSSION WITHOUT LOSS OF CONSCIOUSNESS, SUBSEQUENT ENCOUNTER: Primary | ICD-10-CM

## 2021-12-01 PROCEDURE — 97530 THERAPEUTIC ACTIVITIES: CPT

## 2021-12-06 ENCOUNTER — OFFICE VISIT (OUTPATIENT)
Dept: PHYSICAL THERAPY | Facility: CLINIC | Age: 66
End: 2021-12-06
Payer: COMMERCIAL

## 2021-12-06 DIAGNOSIS — R26.89 IMBALANCE: ICD-10-CM

## 2021-12-06 DIAGNOSIS — S06.0X0D CONCUSSION WITHOUT LOSS OF CONSCIOUSNESS, SUBSEQUENT ENCOUNTER: Primary | ICD-10-CM

## 2021-12-06 DIAGNOSIS — R42 DIZZINESS: ICD-10-CM

## 2021-12-06 PROCEDURE — 97112 NEUROMUSCULAR REEDUCATION: CPT

## 2021-12-07 ENCOUNTER — TELEPHONE (OUTPATIENT)
Dept: NEUROLOGY | Facility: CLINIC | Age: 66
End: 2021-12-07

## 2021-12-07 ENCOUNTER — OFFICE VISIT (OUTPATIENT)
Dept: NEUROLOGY | Facility: CLINIC | Age: 66
End: 2021-12-07
Payer: COMMERCIAL

## 2021-12-07 VITALS
HEIGHT: 70 IN | WEIGHT: 225 LBS | SYSTOLIC BLOOD PRESSURE: 160 MMHG | BODY MASS INDEX: 32.21 KG/M2 | DIASTOLIC BLOOD PRESSURE: 90 MMHG

## 2021-12-07 DIAGNOSIS — F43.10 POST TRAUMATIC STRESS DISORDER: Primary | ICD-10-CM

## 2021-12-07 DIAGNOSIS — G47.00 INSOMNIA: ICD-10-CM

## 2021-12-07 DIAGNOSIS — F44.7 FUNCTIONAL NEUROLOGICAL SYMPTOM DISORDER WITH MIXED SYMPTOMS: ICD-10-CM

## 2021-12-07 DIAGNOSIS — G43.009 MIGRAINE WITHOUT AURA AND WITHOUT STATUS MIGRAINOSUS, NOT INTRACTABLE: ICD-10-CM

## 2021-12-07 DIAGNOSIS — Z87.820 H/O CONCUSSION: ICD-10-CM

## 2021-12-07 PROCEDURE — 99215 OFFICE O/P EST HI 40 MIN: CPT | Performed by: PSYCHIATRY & NEUROLOGY

## 2021-12-07 PROCEDURE — 99417 PROLNG OP E/M EACH 15 MIN: CPT | Performed by: PSYCHIATRY & NEUROLOGY

## 2021-12-08 ENCOUNTER — APPOINTMENT (OUTPATIENT)
Dept: PHYSICAL THERAPY | Facility: CLINIC | Age: 66
End: 2021-12-08
Payer: COMMERCIAL

## 2021-12-13 ENCOUNTER — APPOINTMENT (OUTPATIENT)
Dept: PHYSICAL THERAPY | Facility: CLINIC | Age: 66
End: 2021-12-13
Payer: COMMERCIAL

## 2021-12-15 ENCOUNTER — APPOINTMENT (OUTPATIENT)
Dept: PHYSICAL THERAPY | Facility: CLINIC | Age: 66
End: 2021-12-15
Payer: COMMERCIAL

## 2021-12-17 ENCOUNTER — NURSE TRIAGE (OUTPATIENT)
Dept: OTHER | Facility: OTHER | Age: 66
End: 2021-12-17

## 2021-12-17 DIAGNOSIS — Z20.822 EXPOSURE TO COVID-19 VIRUS: Primary | ICD-10-CM

## 2021-12-18 PROCEDURE — U0003 INFECTIOUS AGENT DETECTION BY NUCLEIC ACID (DNA OR RNA); SEVERE ACUTE RESPIRATORY SYNDROME CORONAVIRUS 2 (SARS-COV-2) (CORONAVIRUS DISEASE [COVID-19]), AMPLIFIED PROBE TECHNIQUE, MAKING USE OF HIGH THROUGHPUT TECHNOLOGIES AS DESCRIBED BY CMS-2020-01-R: HCPCS | Performed by: FAMILY MEDICINE

## 2021-12-18 PROCEDURE — U0005 INFEC AGEN DETEC AMPLI PROBE: HCPCS | Performed by: FAMILY MEDICINE

## 2021-12-20 ENCOUNTER — APPOINTMENT (OUTPATIENT)
Dept: PHYSICAL THERAPY | Facility: CLINIC | Age: 66
End: 2021-12-20
Payer: COMMERCIAL

## 2021-12-22 ENCOUNTER — APPOINTMENT (OUTPATIENT)
Dept: PHYSICAL THERAPY | Facility: CLINIC | Age: 66
End: 2021-12-22
Payer: COMMERCIAL

## 2021-12-27 ENCOUNTER — APPOINTMENT (OUTPATIENT)
Dept: PHYSICAL THERAPY | Facility: CLINIC | Age: 66
End: 2021-12-27
Payer: COMMERCIAL

## 2021-12-29 ENCOUNTER — APPOINTMENT (OUTPATIENT)
Dept: PHYSICAL THERAPY | Facility: CLINIC | Age: 66
End: 2021-12-29
Payer: COMMERCIAL

## 2022-01-01 DIAGNOSIS — Z87.820 HISTORY OF CONCUSSION: ICD-10-CM

## 2022-01-01 DIAGNOSIS — R51.9 CHRONIC HEADACHES: ICD-10-CM

## 2022-01-01 DIAGNOSIS — G89.29 CHRONIC HEADACHES: ICD-10-CM

## 2022-01-01 RX ORDER — AMITRIPTYLINE HYDROCHLORIDE 25 MG/1
TABLET, FILM COATED ORAL
Qty: 60 TABLET | Refills: 0 | Status: CANCELLED | OUTPATIENT
Start: 2022-01-01

## 2022-01-03 NOTE — TELEPHONE ENCOUNTER
I spoke with Melissa Rollins, pharmacist, at [facility]  The patient still has refills left on his Amitriptyline Rx; they will fill it today  I called and spoke with the patient and advised above

## 2022-01-31 ENCOUNTER — TELEPHONE (OUTPATIENT)
Dept: NEUROLOGY | Facility: CLINIC | Age: 67
End: 2022-01-31

## 2022-01-31 NOTE — TELEPHONE ENCOUNTER
Called and spoke to patient - confirmed upcoming appointment with Dr Vini Fox  Provided patient with apt date, time and location  Informed patient that check in is at least 15 minutes prior to apt time  PT REFUSED VIRTUAL

## 2022-02-01 ENCOUNTER — TELEPHONE (OUTPATIENT)
Dept: BEHAVIORAL/MENTAL HEALTH CLINIC | Facility: CLINIC | Age: 67
End: 2022-02-01
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
## 2022-02-08 ENCOUNTER — SOCIAL WORK (OUTPATIENT)
Dept: BEHAVIORAL/MENTAL HEALTH CLINIC | Facility: CLINIC | Age: 67
End: 2022-02-08
Payer: COMMERCIAL

## 2022-02-08 DIAGNOSIS — F41.9 ANXIETY: ICD-10-CM

## 2022-02-08 DIAGNOSIS — G47.00 INSOMNIA: ICD-10-CM

## 2022-02-08 DIAGNOSIS — F33.1 MODERATE EPISODE OF RECURRENT MAJOR DEPRESSIVE DISORDER (HCC): Primary | ICD-10-CM

## 2022-02-08 PROCEDURE — 90791 PSYCH DIAGNOSTIC EVALUATION: CPT | Performed by: COUNSELOR
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
## 2022-02-09 ENCOUNTER — OFFICE VISIT (OUTPATIENT)
Dept: NEUROLOGY | Facility: CLINIC | Age: 67
End: 2022-02-09
Payer: COMMERCIAL

## 2022-02-09 VITALS
HEART RATE: 44 BPM | SYSTOLIC BLOOD PRESSURE: 155 MMHG | HEIGHT: 70 IN | WEIGHT: 222 LBS | BODY MASS INDEX: 31.78 KG/M2 | DIASTOLIC BLOOD PRESSURE: 75 MMHG

## 2022-02-09 DIAGNOSIS — G43.009 MIGRAINE WITHOUT AURA AND WITHOUT STATUS MIGRAINOSUS, NOT INTRACTABLE: Primary | ICD-10-CM

## 2022-02-09 DIAGNOSIS — G47.00 INSOMNIA, UNSPECIFIED TYPE: ICD-10-CM

## 2022-02-09 DIAGNOSIS — F44.7 FUNCTIONAL NEUROLOGICAL SYMPTOM DISORDER WITH MIXED SYMPTOMS: ICD-10-CM

## 2022-02-09 PROCEDURE — 99215 OFFICE O/P EST HI 40 MIN: CPT | Performed by: PSYCHIATRY & NEUROLOGY

## 2022-02-09 RX ORDER — GABAPENTIN 100 MG/1
CAPSULE ORAL
Qty: 90 CAPSULE | Refills: 4 | Status: SHIPPED | OUTPATIENT
Start: 2022-02-09

## 2022-02-09 NOTE — PATIENT INSTRUCTIONS
Referral to sleep medicine placed last visit, please contact them [phone]    Continue to follow with primary care provider regarding secondary stroke prevention  - discuss mood as well   - discuss heart rate being low please   - "The body keeps the score" - great book on PTSD  - I recommend continued counselor for post traumatic stress       Headache/migraine treatment:   Abortive medications (for immediate treatment of a headache): It is ok to take ibuprofen, acetaminophen or naproxen (Advil, Tylenol,  Aleve, Excedrin) if they help your headaches you should limit these to No more than 3 times a week to avoid medication overuse/rebound headaches  Over the counter preventive supplements for headaches/migraines (if you try, try for 3 months straight)  (to take every day to help prevent headaches - not to take at the time of headache): There are combo pills online of these - none of which regulated by FDA and double check dosing - take appropriate dose only once a day- preventa migraine, migravent, mind ease, migrelief   [x] Magnesium 400mg daily (If any diarrhea or upset stomach, decrease dose  as tolerated)      Prescription preventive medications for headaches/migraines   (to take every day to help prevent headaches - not to take at the time of headache):  [x] Amytriptyline prescribed by Dr Scott Levy, ok to stop since not helping and may cause side effects     [x] trial of gabapentin with gradual titration as needed    -100 mg nightly for 1 week, then if needed/tolerated increase to 200 mg nightly, then if needed/tolerated increase to 300 mg nightly     *Typically these types of medications take time untill you see the benefit, although some may see improvement in days, often it may take weeks, especially if the medication is being titrated up to a beneficial level  Please contact us if there are any concerns or questions regarding the medication       Lifestyle Recommendations:  [x] SLEEP - Maintain a regular sleep schedule: Adults need at least 7-8 hours of uninterrupted a night  Maintain good sleep hygiene:  Going to bed and waking up at consistent times, avoiding excessive daytime naps, avoiding caffeinated beverages in the evening, avoid excessive stimulation in the evening and generally using bed primarily for sleeping  One hour before bedtime would recommend turning lights down lower, decreasing your activity (may read quietly, listen to music at a low volume)  When you get into bed, should eliminate all technology (no texting, emailing, playing with your phone, iPad or tablet in bed)  [x] HYDRATION - Maintain good hydration  Drink  2L of fluid a day (4 typical small water bottles)  [x] DIET - Maintain good nutrition  In particular don't skip meals and try and eat healthy balanced meals regularly  [x] EXERCISE - physical exercise as we all know is good for you in many ways, and not only is good for your heart, but also is beneficial for your mental health, cognitive health and  chronic pain/headaches  I would encourage at the least 5 days of physical exercise weekly for at least 30 minutes  Education and Follow-up  [x] Please call with any questions or concerns  Of course if any new concerning symptoms go to the emergency department    [x] Follow up 2 months, sooner if needed  Follow up as scheduled with your primary neurologist Dr Doe Morris as scheduled 3/10/22, also discuss secondary stroke prevention

## 2022-02-09 NOTE — PROGRESS NOTES
Review of Systems   Constitutional: Negative  Negative for appetite change and fever  HENT: Negative  Negative for hearing loss, tinnitus, trouble swallowing and voice change  Eyes: Negative  Negative for photophobia and pain  Respiratory: Negative  Negative for shortness of breath  Cardiovascular: Negative  Negative for palpitations  Gastrointestinal: Negative  Negative for nausea and vomiting  Endocrine: Negative  Negative for cold intolerance  Genitourinary: Negative  Negative for dysuria, frequency and urgency  Musculoskeletal: Positive for gait problem  Negative for myalgias and neck pain  Skin: Negative  Negative for rash  Neurological: Positive for headaches (daily/constant)  Negative for dizziness, tremors, seizures, syncope, facial asymmetry, speech difficulty, weakness, light-headedness and numbness  Hematological: Negative  Does not bruise/bleed easily  Psychiatric/Behavioral: Positive for confusion and sleep disturbance  Negative for hallucinations

## 2022-02-22 ENCOUNTER — SOCIAL WORK (OUTPATIENT)
Dept: BEHAVIORAL/MENTAL HEALTH CLINIC | Facility: CLINIC | Age: 67
End: 2022-02-22
Payer: COMMERCIAL

## 2022-02-22 DIAGNOSIS — F41.9 ANXIETY: ICD-10-CM

## 2022-02-22 DIAGNOSIS — F33.1 MODERATE EPISODE OF RECURRENT MAJOR DEPRESSIVE DISORDER (HCC): Primary | ICD-10-CM

## 2022-02-22 PROCEDURE — 90834 PSYTX W PT 45 MINUTES: CPT | Performed by: COUNSELOR
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
## 2022-03-10 ENCOUNTER — OFFICE VISIT (OUTPATIENT)
Dept: NEUROLOGY | Facility: CLINIC | Age: 67
End: 2022-03-10
Payer: COMMERCIAL

## 2022-03-10 VITALS
WEIGHT: 228 LBS | TEMPERATURE: 96.9 F | HEART RATE: 66 BPM | HEIGHT: 70 IN | SYSTOLIC BLOOD PRESSURE: 139 MMHG | DIASTOLIC BLOOD PRESSURE: 85 MMHG | BODY MASS INDEX: 32.64 KG/M2

## 2022-03-10 DIAGNOSIS — G43.909 MIGRAINES: ICD-10-CM

## 2022-03-10 DIAGNOSIS — Z87.820 HISTORY OF CONCUSSION: ICD-10-CM

## 2022-03-10 DIAGNOSIS — F43.10 PTSD (POST-TRAUMATIC STRESS DISORDER): Primary | ICD-10-CM

## 2022-03-10 DIAGNOSIS — G47.00 INSOMNIA: ICD-10-CM

## 2022-03-10 PROCEDURE — 99214 OFFICE O/P EST MOD 30 MIN: CPT | Performed by: PSYCHIATRY & NEUROLOGY
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
## 2022-03-17 ENCOUNTER — SOCIAL WORK (OUTPATIENT)
Dept: BEHAVIORAL/MENTAL HEALTH CLINIC | Facility: CLINIC | Age: 67
End: 2022-03-17
Payer: COMMERCIAL

## 2022-03-17 DIAGNOSIS — F41.9 ANXIETY: ICD-10-CM

## 2022-03-17 DIAGNOSIS — F33.1 MODERATE EPISODE OF RECURRENT MAJOR DEPRESSIVE DISORDER (HCC): Primary | ICD-10-CM

## 2022-03-17 PROCEDURE — 90834 PSYTX W PT 45 MINUTES: CPT | Performed by: COUNSELOR
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
## 2022-03-20 DIAGNOSIS — G47.00 INSOMNIA, UNSPECIFIED TYPE: ICD-10-CM

## 2022-03-20 DIAGNOSIS — G43.009 MIGRAINE WITHOUT AURA AND WITHOUT STATUS MIGRAINOSUS, NOT INTRACTABLE: ICD-10-CM

## 2022-03-20 RX ORDER — GABAPENTIN 100 MG/1
CAPSULE ORAL
Qty: 90 CAPSULE | Refills: 0 | Status: CANCELLED | OUTPATIENT
Start: 2022-03-20

## 2022-03-31 ENCOUNTER — SOCIAL WORK (OUTPATIENT)
Dept: BEHAVIORAL/MENTAL HEALTH CLINIC | Facility: CLINIC | Age: 67
End: 2022-03-31
Payer: COMMERCIAL

## 2022-03-31 DIAGNOSIS — F41.9 ANXIETY: ICD-10-CM

## 2022-03-31 DIAGNOSIS — F43.10 PTSD (POST-TRAUMATIC STRESS DISORDER): ICD-10-CM

## 2022-03-31 DIAGNOSIS — F33.1 MODERATE EPISODE OF RECURRENT MAJOR DEPRESSIVE DISORDER (HCC): Primary | ICD-10-CM

## 2022-03-31 PROCEDURE — 90834 PSYTX W PT 45 MINUTES: CPT | Performed by: COUNSELOR

## 2022-03-31 NOTE — PSYCH
Psychotherapy Provided: Individual Psychotherapy 45 minutes     Length of time in session: 45 minutes, follow up in 1 week    Goals addressed in session: Goal 1     Pain:  no  none    0    Current suicide risk : Low       DATA:  Atnhony Solis was in person for session  Noted that he had a headache  Seemed frustrated by his condition  Feels he is not getting answers from medical personnel  PHQ-9 was 12, moderately depressed  Noted that he is avoiding things he does not want to deal with such as his mail, but tends to do things that he enjoys  Discussed ways to begin to tackle things slowly and not become overwhelmed all at once  ASSESSMENT: Anthony Solis was detached, slow to engage  Thought were concrete, had difficulty with focus at first  Mood was depressed and affect was congruent to mood  Insight was limited and he was oriented x4  PLAN:   Anthony Solis will continue to observe ways that he is avoiding tasks  [facility] Treatment Plan [facility]: Diagnosis and Treatment Plan explained to Orlando Span relates understanding diagnosis and is agreeable to Treatment Plan   Yes

## 2022-04-01 ENCOUNTER — TELEPHONE (OUTPATIENT)
Dept: NEUROLOGY | Facility: CLINIC | Age: 67
End: 2022-04-01

## 2022-04-04 ENCOUNTER — TELEPHONE (OUTPATIENT)
Dept: BEHAVIORAL/MENTAL HEALTH CLINIC | Facility: CLINIC | Age: 67
End: 2022-04-04

## 2022-04-13 ENCOUNTER — OFFICE VISIT (OUTPATIENT)
Dept: NEUROLOGY | Facility: CLINIC | Age: 67
End: 2022-04-13
Payer: COMMERCIAL

## 2022-04-13 VITALS
DIASTOLIC BLOOD PRESSURE: 80 MMHG | HEART RATE: 76 BPM | SYSTOLIC BLOOD PRESSURE: 140 MMHG | BODY MASS INDEX: 32.07 KG/M2 | WEIGHT: 223.5 LBS

## 2022-04-13 DIAGNOSIS — F43.10 PTSD (POST-TRAUMATIC STRESS DISORDER): ICD-10-CM

## 2022-04-13 DIAGNOSIS — F44.7 FUNCTIONAL NEUROLOGICAL SYMPTOM DISORDER WITH MIXED SYMPTOMS: ICD-10-CM

## 2022-04-13 DIAGNOSIS — G43.009 MIGRAINE WITHOUT AURA AND WITHOUT STATUS MIGRAINOSUS, NOT INTRACTABLE: Primary | ICD-10-CM

## 2022-04-13 DIAGNOSIS — R51.9 CHRONIC DAILY HEADACHE: ICD-10-CM

## 2022-04-13 PROCEDURE — 99417 PROLNG OP E/M EACH 15 MIN: CPT | Performed by: PSYCHIATRY & NEUROLOGY

## 2022-04-13 PROCEDURE — 99215 OFFICE O/P EST HI 40 MIN: CPT | Performed by: PSYCHIATRY & NEUROLOGY

## 2022-04-13 NOTE — PATIENT INSTRUCTIONS
At the end of the day write down 3 positive things that have happened during the day  At the beginning of the day write 2-3 goals that you can accomplish during the day  Referral to sleep medicine placed 12/07/2021, please contact them [phone]    Continue to follow with primary care provider regarding secondary stroke prevention    - "The body keeps the score" - great book on PTSD  - I recommend continued counselor for post traumatic stress     Discuss Mood With PCP - consider sertraline     Headache/migraine treatment:   Abortive medications (for immediate treatment of a headache): It is ok to take ibuprofen, acetaminophen or naproxen (Advil, Tylenol,  Aleve, Excedrin) if they help your headaches you should limit these to No more than 3 times a week to avoid medication overuse/rebound headaches  Over the counter preventive supplements for headaches/migraines (if you try, try for 3 months straight)  (to take every day to help prevent headaches - not to take at the time of headache): There are combo pills online of these - none of which regulated by FDA and double check dosing - take appropriate dose only once a day- preventa migraine, migravent, mind ease, migrelief   [x] Magnesium 400mg daily (If any diarrhea or upset stomach, decrease dose  as tolerated)      Prescription preventive medications for headaches/migraines   (to take every day to help prevent headaches - not to take at the time of headache):  [x]  gabapentin with gradual titration as needed  Continue  200 mg nightly, then if needed/tolerated increase to 300 mg nightly     *Typically these types of medications take time untill you see the benefit, although some may see improvement in days, often it may take weeks, especially if the medication is being titrated up to a beneficial level  Please contact us if there are any concerns or questions regarding the medication       Lifestyle Recommendations:  [x] SLEEP - Maintain a regular sleep schedule: Adults need at least 7-8 hours of uninterrupted a night  Maintain good sleep hygiene:  Going to bed and waking up at consistent times, avoiding excessive daytime naps, avoiding caffeinated beverages in the evening, avoid excessive stimulation in the evening and generally using bed primarily for sleeping  One hour before bedtime would recommend turning lights down lower, decreasing your activity (may read quietly, listen to music at a low volume)  When you get into bed, should eliminate all technology (no texting, emailing, playing with your phone, iPad or tablet in bed)  [x] HYDRATION - Maintain good hydration  Drink  2L of fluid a day (4 typical small water bottles)  [x] DIET - Maintain good nutrition  In particular don't skip meals and try and eat healthy balanced meals regularly  [x] EXERCISE - physical exercise as we all know is good for you in many ways, and not only is good for your heart, but also is beneficial for your mental health, cognitive health and  chronic pain/headaches  I would encourage at the least 5 days of physical exercise weekly for at least 30 minutes  Education and Follow-up  [x] Please call with any questions or concerns  Of course if any new concerning symptoms go to the emergency department    [x] Follow up 2-3 months, sooner if needed
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
## 2022-04-13 NOTE — LETTER
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  Musculoskeletal: Negative  Negative for myalgias and neck pain  Skin: Negative  Negative for rash  Neurological: Positive for headaches  Negative for dizziness, tremors, seizures, syncope, facial asymmetry, speech difficulty, weakness, light-headedness and numbness  Hematological: Negative  Does not bruise/bleed easily  Psychiatric/Behavioral: Negative  Negative for confusion, hallucinations and sleep disturbance  All other systems reviewed and are negative  I have spent 50 minutes with Patient  today in which greater than 50% of this time was spent in counseling/coordination of care regarding Diagnostic results, Prognosis, Risks and benefits of tx options, Intructions for management, Patient  education, Importance of tx compliance, Risk factor reductions and Impressions  I also spent 24 minutes non face to face for this patient the same day         Author:  Hilaria Melton MD 4/13/2022 11:40 AM

## 2022-04-14 ENCOUNTER — SOCIAL WORK (OUTPATIENT)
Dept: BEHAVIORAL/MENTAL HEALTH CLINIC | Facility: CLINIC | Age: 67
End: 2022-04-14
Payer: COMMERCIAL

## 2022-04-14 DIAGNOSIS — F33.1 MODERATE EPISODE OF RECURRENT MAJOR DEPRESSIVE DISORDER (HCC): Primary | ICD-10-CM

## 2022-04-14 DIAGNOSIS — F43.10 PTSD (POST-TRAUMATIC STRESS DISORDER): ICD-10-CM

## 2022-04-14 PROCEDURE — 90834 PSYTX W PT 45 MINUTES: CPT | Performed by: COUNSELOR
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
## 2022-04-26 ENCOUNTER — SOCIAL WORK (OUTPATIENT)
Dept: BEHAVIORAL/MENTAL HEALTH CLINIC | Facility: CLINIC | Age: 67
End: 2022-04-26
Payer: COMMERCIAL

## 2022-04-26 DIAGNOSIS — F33.1 MODERATE EPISODE OF RECURRENT MAJOR DEPRESSIVE DISORDER (HCC): ICD-10-CM

## 2022-04-26 DIAGNOSIS — F43.10 PTSD (POST-TRAUMATIC STRESS DISORDER): ICD-10-CM

## 2022-04-26 DIAGNOSIS — F41.9 ANXIETY: Primary | ICD-10-CM

## 2022-04-26 PROCEDURE — 90834 PSYTX W PT 45 MINUTES: CPT | Performed by: COUNSELOR

## 2022-04-26 NOTE — PSYCH
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 and does need the patient's help as much  Encouraged patient to get involved with his son's car and helping him out  Patient ignores mail and avoids starting any task that is involved or complicated  He knows he has utility bills that are past due and the paperwork for his long term disability has not been completed  Patient does not want to ask anyone for help as he would rather help others  Suggested to patient he ask his brother to help him with the paperwork, sorting, prioritizing, and completing as necessary  Patient agreed to go through his 3 stacks of important papers and look for the disability forms  ASSESS:  Depression has increased  PLAN:  Continue individual psychotherapy  Decide who to ask for help and follow through  Try to get out of the house every day  Use CBT to help with mood regulation  Behavioral Health Treatment Plan [facility]: Diagnosis and Treatment Plan explained to Mercedes Dockery relates understanding diagnosis and is agreeable to Treatment Plan   Yes

## 2022-05-04 ENCOUNTER — OFFICE VISIT (OUTPATIENT)
Dept: SLEEP CENTER | Facility: CLINIC | Age: 67
End: 2022-05-04
Payer: COMMERCIAL

## 2022-05-04 VITALS
HEART RATE: 68 BPM | BODY MASS INDEX: 32.35 KG/M2 | WEIGHT: 226 LBS | DIASTOLIC BLOOD PRESSURE: 77 MMHG | HEIGHT: 70 IN | SYSTOLIC BLOOD PRESSURE: 140 MMHG

## 2022-05-04 DIAGNOSIS — G47.00 INSOMNIA, UNSPECIFIED TYPE: ICD-10-CM

## 2022-05-04 DIAGNOSIS — G47.33 OSA (OBSTRUCTIVE SLEEP APNEA): Primary | ICD-10-CM

## 2022-05-04 DIAGNOSIS — I10 ESSENTIAL HYPERTENSION: ICD-10-CM

## 2022-05-04 PROCEDURE — 99204 OFFICE O/P NEW MOD 45 MIN: CPT | Performed by: INTERNAL MEDICINE

## 2022-05-04 NOTE — PATIENT INSTRUCTIONS
Sleep Apnea   AMBULATORY CARE:   Sleep apnea  is a condition that causes you to stop breathing often during sleep  Types of sleep apnea:   · Obstructive sleep apnea (KASI)  is the most common kind  The muscles and tissues around your throat relax and block air from passing through  Obesity, use of alcohol or cigarettes, or a family history are common causes  KASI may increase your risk for complications after surgery  · Central sleep apnea (CSA)  means your brain does not send signals to the muscles that control breathing  You do not take a breath even though your airway is open  Common causes include medical conditions such as heart failure, being older than 40, or use of opioids  · Complex (or mixed) sleep apnea  means you have both obstructive and central sleep apnea  Common signs and symptoms:   · Loud snoring or long pauses in breathing    · Feeling sleepy, slow, and tired during the day    · Snorting, gasping, or choking while you sleep, and waking up suddenly because of these    · Feeling irritable during the day    · Dry mouth or a headache in the mornings    · Heavy night sweating    · A hard time thinking, remembering things, or focusing on your tasks the following day    Call your local emergency number (911 in the [address]) if:   · You have chest pain or trouble breathing  Call your doctor if:   · You have new or worsening signs or symptoms  · You have questions or concerns about your condition or care  Treatment  depends on the kind of apnea you have  · A mouth device  may be needed if you have mild sleep apnea  These are designed to keep your throat open  Ask your dentist or healthcare provider about the best mouth device for you  · A machine  may be used to help you get more air during sleep  A mask may be placed over your nose and mouth, or just your nose  The mask is hooked to the machine  You will get air through the mask      ? A continuous positive airway pressure (CPAP) machine  is used to keep your airway open during sleep  The machine blows a gentle stream of air into the mask when you breathe  This helps keep your airway open so you can breathe more regularly  Extra oxygen may be given through the machine  ? A bilevel positive airway pressure (BiPAP) machine  gives air but lowers the pressure when you breathe out  ? An adaptive servo-ventilator (ASV)  is a machine that learns your usual breathing pattern  Then, it uses pressure to give you air and prevent stops in your breathing  · Surgery  to expand your airway or remove extra tissues may be needed  Surgery is usually only considered if other treatments do not work  Manage or prevent sleep apnea:   · Reach and maintain a healthy weight  Ask your healthcare provider what a healthy weight is for you  Ask him or her to help you create a safe weight loss plan if you are overweight  Even a small goal of a 10% weight loss can improve your symptoms  · Do not smoke  Nicotine and other chemicals in cigarettes and cigars can cause lung damage  Ask your healthcare provider for information if you currently smoke and need help to quit  E-cigarettes or smokeless tobacco still contain nicotine  Talk to your healthcare provider before you use these products  · Do not drink alcohol or take sedative medicine before you go to sleep  Alcohol and sedatives can relax the muscles and tissues around your throat  This can block the airflow to your lungs  · Sleep on your side or use pillows designed to prevent sleep apnea  This prevents your tongue or other tissues from blocking your throat  You can also raise the head of your bed  Follow up with your doctor or specialist as directed: You may need to have blood tests during your follow-up visits  Work with your provider to find the right breathing support equipment and settings for you  Write down your questions so you remember to ask them during your visits    © Copyright [employer] 2022 Information is for End User's use only and may not be sold, redistributed or otherwise used for commercial purposes  All illustrations and images included in CareNotes® are the copyrighted property of A D A M , Inc  or Adeel Warren  The above information is an  only  It is not intended as medical advice for individual conditions or treatments  Talk to your doctor, nurse or pharmacist before following any medical regimen to see if it is safe and effective for you

## 2022-05-04 NOTE — ASSESSMENT & PLAN NOTE
· Ermelinda Rivera has high pretest probability for obstructive sleep apnea  · Given history of excessive daytime sleepiness, snore loudly BMI 32 43, Mallampati score 4 would like to order an in-lab study

## 2022-05-04 NOTE — PROGRESS NOTES
Review of Systems      Genitourinary need to urinate more than twice a night   Cardiology none   Gastrointestinal none   Neurology frequent headaches, awaken with headache, forgetfulness, poor concentration or confusion, , difficulty with memory and balance problems   Constitutional fatigue and weight change   Integumentary rash or dry skin and itching   Psychiatry anxiety and mood change   Musculoskeletal none   Pulmonary none   ENT throat clearing   Endocrine frequent urination   Hematological none
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
## 2022-05-05 ENCOUNTER — TELEPHONE (OUTPATIENT)
Dept: SLEEP CENTER | Facility: CLINIC | Age: 67
End: 2022-05-05

## 2022-05-05 NOTE — TELEPHONE ENCOUNTER
Pt saw Dr Ana Melgoza on 5/4  Due to pt being unstable w/ his balance he was not scheduled after appt for a follow-up  I called pt today to schedule follow-up but he was driving  He was directed to call central scheduling to schedule his follow-up appt

## 2022-05-10 ENCOUNTER — SOCIAL WORK (OUTPATIENT)
Dept: BEHAVIORAL/MENTAL HEALTH CLINIC | Facility: CLINIC | Age: 67
End: 2022-05-10
Payer: COMMERCIAL

## 2022-05-10 DIAGNOSIS — F41.9 ANXIETY: ICD-10-CM

## 2022-05-10 DIAGNOSIS — F33.1 MODERATE EPISODE OF RECURRENT MAJOR DEPRESSIVE DISORDER (HCC): Primary | ICD-10-CM

## 2022-05-10 PROCEDURE — 90834 PSYTX W PT 45 MINUTES: CPT | Performed by: COUNSELOR
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
## 2022-05-27 ENCOUNTER — HOSPITAL ENCOUNTER (OUTPATIENT)
Dept: SLEEP CENTER | Facility: CLINIC | Age: 67
Discharge: HOME | End: 2022-05-27
Payer: COMMERCIAL

## 2022-05-27 DIAGNOSIS — G47.00 INSOMNIA, UNSPECIFIED TYPE: ICD-10-CM

## 2022-05-27 DIAGNOSIS — G47.33 OSA (OBSTRUCTIVE SLEEP APNEA): ICD-10-CM

## 2022-05-27 PROCEDURE — 95810 POLYSOM 6/> YRS 4/> PARAM: CPT

## 2022-05-27 PROCEDURE — 95810 POLYSOM 6/> YRS 4/> PARAM: CPT | Performed by: INTERNAL MEDICINE

## 2022-05-28 NOTE — PROGRESS NOTES
Sleep Study Documentation    Pre-Sleep Study       Sleep testing procedure explained to patient:YES    Patient napped prior to study:NO    Caffeine:Dayshift worker after 12PM   Caffeine use:NO    Alcohol:Dayshift workers after 5PM: Alcohol use:NO    Typical day for patient:YES       Study Documentation    Sleep Study Indications: Snore, EDS, nocturnal choking, unrefreshed sleep, chronic AM hedache    Sleep Study: Diagnostic   Snore: Moderate  Supplemental O2: no    O2 flow rate (L/min) range NA  O2 flow rate (L/min) final NA  Minimum SaO2 70  Baseline SaO2 98        Mode of Therapy: NA    EKG abnormalities: yes:  EPOCH example and comments:     EEG abnormalities: no    Sleep Study Recorded < 2 hours: N/A    Sleep Study Recorded > 2 hours but incomplete study: N/A    Sleep Study Recorded 6 hours but no sleep obtained: NO    Patient classification: employed       Post-Sleep Study    Medication used at bedtime or during sleep study:NO    Patient reports time it took to fall asleep:greater than 60 minutes    Patient reports waking up during study:3 or more times  Patient reports returning to sleep in 10 to 30 minutes  Patient reports sleeping 2 to 4 hours without dreaming  Patient reports sleep during study:typical    Patient rated sleepiness: Somewhat sleepy or tired    PAP treatment:no

## 2022-05-31 ENCOUNTER — TELEPHONE (OUTPATIENT)
Dept: NEUROLOGY | Facility: CLINIC | Age: 67
End: 2022-05-31

## 2022-05-31 DIAGNOSIS — G47.33 OSA (OBSTRUCTIVE SLEEP APNEA): Primary | ICD-10-CM

## 2022-05-31 NOTE — TELEPHONE ENCOUNTER
Received fax from [facility] for medical records, scanned into media manager, and faxed to [facility]

## 2022-06-07 ENCOUNTER — DOCUMENTATION (OUTPATIENT)
Dept: BEHAVIORAL/MENTAL HEALTH CLINIC | Facility: CLINIC | Age: 67
End: 2022-06-07

## 2022-06-07 ENCOUNTER — TELEPHONE (OUTPATIENT)
Dept: PSYCHIATRY | Facility: CLINIC | Age: 67
End: 2022-06-07

## 2022-06-07 NOTE — TELEPHONE ENCOUNTER
Called pt to cancel 6/21/22 appt, pt stated he didn't think counseling was helping him  He asked that we cancel all upcoming appointments  Also, he doesn't know what is happening with his insurance, so that is another factor in his not returning
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
## 2022-06-15 ENCOUNTER — TELEPHONE (OUTPATIENT)
Dept: SLEEP CENTER | Facility: CLINIC | Age: 67
End: 2022-06-15

## 2022-06-15 NOTE — TELEPHONE ENCOUNTER
Left message for patient to call office to review sleep study results  Study shows severe KASI   (AHI 74 5)  CPAP study ordered and needs to be scheduled

## 2022-06-19 ENCOUNTER — APPOINTMENT (EMERGENCY)
Dept: RADIOLOGY | Facility: HOSPITAL | Age: 67
End: 2022-06-19
Payer: COMMERCIAL

## 2022-06-19 ENCOUNTER — HOSPITAL ENCOUNTER (EMERGENCY)
Facility: HOSPITAL | Age: 67
Discharge: HOME | End: 2022-06-19
Attending: EMERGENCY MEDICINE
Payer: COMMERCIAL

## 2022-06-19 ENCOUNTER — OFFICE VISIT (OUTPATIENT)
Dept: URGENT CARE | Facility: CLINIC | Age: 67
End: 2022-06-19
Payer: COMMERCIAL

## 2022-06-19 ENCOUNTER — APPOINTMENT (OUTPATIENT)
Dept: RADIOLOGY | Facility: CLINIC | Age: 67
End: 2022-06-19
Payer: COMMERCIAL

## 2022-06-19 VITALS
RESPIRATION RATE: 20 BRPM | OXYGEN SATURATION: 97 % | DIASTOLIC BLOOD PRESSURE: 74 MMHG | SYSTOLIC BLOOD PRESSURE: 142 MMHG | BODY MASS INDEX: 31.78 KG/M2 | HEIGHT: 70 IN | WEIGHT: 222 LBS | HEART RATE: 73 BPM | TEMPERATURE: 97.6 F

## 2022-06-19 VITALS
TEMPERATURE: 96.8 F | RESPIRATION RATE: 20 BRPM | SYSTOLIC BLOOD PRESSURE: 173 MMHG | OXYGEN SATURATION: 98 % | DIASTOLIC BLOOD PRESSURE: 93 MMHG | HEART RATE: 60 BPM

## 2022-06-19 DIAGNOSIS — Z86.79 HISTORY OF AORTIC DISSECTION: ICD-10-CM

## 2022-06-19 DIAGNOSIS — S22.41XA CLOSED FRACTURE OF MULTIPLE RIBS OF RIGHT SIDE, INITIAL ENCOUNTER: Primary | ICD-10-CM

## 2022-06-19 DIAGNOSIS — R07.9 RIGHT-SIDED CHEST PAIN: Primary | ICD-10-CM

## 2022-06-19 DIAGNOSIS — S29.9XXA RIB INJURY: ICD-10-CM

## 2022-06-19 PROBLEM — I71.03 DISSECTION OF THORACOABDOMINAL AORTA (HCC): Status: ACTIVE | Noted: 2021-06-05

## 2022-06-19 PROBLEM — S36.029A SPLEEN HEMATOMA: Status: ACTIVE | Noted: 2021-05-25

## 2022-06-19 PROCEDURE — G1004 CDSM NDSC: HCPCS

## 2022-06-19 PROCEDURE — 71101 X-RAY EXAM UNILAT RIBS/CHEST: CPT

## 2022-06-19 PROCEDURE — 71250 CT THORAX DX C-: CPT

## 2022-06-19 PROCEDURE — 99205 OFFICE O/P NEW HI 60 MIN: CPT | Performed by: PHYSICIAN ASSISTANT

## 2022-06-19 PROCEDURE — 99284 EMERGENCY DEPT VISIT MOD MDM: CPT

## 2022-06-19 PROCEDURE — 99284 EMERGENCY DEPT VISIT MOD MDM: CPT | Performed by: EMERGENCY MEDICINE

## 2022-06-19 RX ORDER — OXYCODONE HYDROCHLORIDE AND ACETAMINOPHEN 5; 325 MG/1; MG/1
1 TABLET ORAL EVERY 4 HOURS PRN
Qty: 20 TABLET | Refills: 0 | Status: SHIPPED | OUTPATIENT
Start: 2022-06-19 | End: 2022-06-26

## 2022-06-19 NOTE — ED PROVIDER NOTES
History  Chief Complaint   Patient presents with    Rib Pain     Patient states he fell on Wednesday, while trying to get out of the tub, landing on his right side  C/o right sided rib pain  Denies hitting head or LOC  Patient states she slipped in the tub while taking a shower and struck the edge of the tub with his right lateral chest wall on Wednesday 4 days prior to arrival   Patient was not lightheaded or dizzy, did not have loss of consciousness  Patient had pain on the right side of the chest no other significant injury  He states he thinks he may have broke some ribs because he can feel them however he did not seek medical attention because he did not have breathing difficulty and knew there was nothing to do  The since that time the pain has intensified  He has been unable to sleep  When he does sleep, he wakes up when rolls onto that right side  He has a cough which is nonproductive and painful  Patient was seen at urgent care center with a did x-rays and referred him here  Prior to Admission Medications   Prescriptions Last Dose Informant Patient Reported? Taking?    Cholecalciferol (VITAMIN D3 PO)  Self Yes No   Sig: Take 50 mcg by mouth daily   Co-Enzyme Q-10 100 MG CAPS  Self No No   Sig: Take 1 capsule by mouth 2 (two) times a day   Patient taking differently: Take 1 capsule by mouth in the morning   Melatonin 5 MG TABS  Self Yes No   Sig: Take by mouth At HS PRN   Multiple Vitamin (multivitamin) capsule  Self Yes No   Sig: Take 1 capsule by mouth daily   Omega-3 1000 MG CAPS  Self Yes No   Sig: Take by mouth daily   ferrous sulfate 325 (65 Fe) mg tablet  Self Yes No   Sig: Take 325 mg by mouth 2 (two) times a day Pt states he is taking 45mg of iron    gabapentin (Neurontin) 100 mg capsule   No No   Si mg nightly for 1 week, then if needed/tolerated increase to 200 mg nightly, then if needed/tolerated increase to 300 mg nightly   Patient taking differently: daily at bedtime 100 mg nightly for 1 week, then if needed/tolerated increase to 200 mg nightly, then if needed/tolerated increase to 300 mg nightly  PT states he is taking 200mg at night   ibuprofen (MOTRIN) 600 mg tablet  Self Yes No   Sig: Take 600 mg by mouth every 6 (six) hours As needed    losartan-hydrochlorothiazide (HYZAAR) 50-12 5 mg per tablet  Self Yes No   Sig: Take 1 tablet by mouth daily   magnesium oxide (MAG-OX) 400 mg  Self No No   Sig: Take 1 tablet (400 mg total) by mouth 2 (two) times a day   sodium chloride 1 g tablet  Self Yes No   Sig: Take 1 g by mouth 2 (two) times a day     vitamin B-12 (VITAMIN B-12) 1,000 mcg tablet  Self Yes No   Sig: Take by mouth 2 (two) times a day     vitamin E 100 UNIT capsule  Self Yes No   Sig: Take 100 Units by mouth 2 (two) times a day        Facility-Administered Medications: None       Past Medical History:   Diagnosis Date    Conversion disorder     Hypertension     PTSD (post-traumatic stress disorder)        Past Surgical History:   Procedure Laterality Date    HERNIA REPAIR      ROTATOR CUFF REPAIR      SPLENECTOMY, PARTIAL         Family History   Problem Relation Age of Onset    No Known Problems Mother     No Known Problems Father      I have reviewed and agree with the history as documented  E-Cigarette/Vaping    E-Cigarette Use Never User      E-Cigarette/Vaping Substances    Nicotine No     THC No     CBD No     Flavoring No     Other No     Unknown No      Social History     Tobacco Use    Smoking status: Never Smoker    Smokeless tobacco: Never Used   Vaping Use    Vaping Use: Never used   Substance Use Topics    Alcohol use: Yes     Comment: occasional     Drug use: No       Review of Systems   Constitutional: Negative for chills and fever  HENT: Negative for congestion and sore throat  Eyes: Negative for visual disturbance  Respiratory: Positive for cough  Negative for shortness of breath and wheezing      Cardiovascular: Positive for chest pain  Negative for palpitations and leg swelling  Gastrointestinal: Negative for abdominal pain and vomiting  Genitourinary: Negative for dysuria and hematuria  Musculoskeletal: Negative for arthralgias and back pain  Skin: Negative for color change and rash  Neurological: Negative for weakness and headaches  Hematological: Does not bruise/bleed easily  Psychiatric/Behavioral: Positive for sleep disturbance  Negative for confusion  All other systems reviewed and are negative  Physical Exam  Physical Exam  Vitals and nursing note reviewed  Constitutional:       Appearance: Normal appearance  HENT:      Head: Normocephalic  Right Ear: External ear normal       Left Ear: External ear normal       Nose: Nose normal       Mouth/Throat:      Pharynx: Oropharynx is clear  Eyes:      Conjunctiva/sclera: Conjunctivae normal    Cardiovascular:      Rate and Rhythm: Normal rate and regular rhythm  Pulmonary:      Effort: Pulmonary effort is normal       Breath sounds: Normal breath sounds  Chest:      Chest wall: Tenderness present  Abdominal:      General: Bowel sounds are normal       Palpations: Abdomen is soft  Tenderness: There is no abdominal tenderness  Musculoskeletal:         General: Normal range of motion  Cervical back: Normal range of motion and neck supple  No tenderness  Skin:     General: Skin is warm and dry  Capillary Refill: Capillary refill takes less than 2 seconds  Findings: No bruising  Neurological:      General: No focal deficit present  Mental Status: He is alert and oriented to person, place, and time     Psychiatric:         Mood and Affect: Mood normal          Behavior: Behavior normal          Vital Signs  ED Triage Vitals [06/19/22 1039]   Temperature Pulse Respirations Blood Pressure SpO2   (!) 96 8 °F (36 °C) 64 19 (!) 171/90 99 %      Temp Source Heart Rate Source Patient Position - Orthostatic VS BP Location FiO2 (%)   Tympanic Monitor Sitting Right arm --      Pain Score       6           Vitals:    06/19/22 1100 06/19/22 1130 06/19/22 1230 06/19/22 1300   BP:  137/86 150/75 (!) 173/93   Pulse: 70 66 60 60   Patient Position - Orthostatic VS:             Visual Acuity  Visual Acuity    Flowsheet Row Most Recent Value   L Pupil Size (mm) 2   R Pupil Size (mm) 2          ED Medications  Medications - No data to display    Diagnostic Studies  Results Reviewed     None                 CT chest without contrast   Final Result by Yarely Javed MD (06/19 1226)      Nondisplaced fractures of the anterior aspects of the right third through sixth ribs  No pulmonary contusion or pneumothorax/hemothorax  There is fusiform aneurysmal enlargement of the ascending thoracic aorta measuring up to 45 mm slightly enlarged from remeasured dimension of 43 mm on the 9/9/2019 study  Recommendation is for cardiothoracic surgery consultation on a nonemergent basis    if not already performed  The study was marked in [facility] for immediate notification  Workstation performed: [id number]                    Procedures  Procedures         ED Course                                             MDM  Number of Diagnoses or Management Options  Closed fracture of multiple ribs of right side, initial encounter  Diagnosis management comments: X-rays reviewed    Will CT for possible pulmonary contusion, collapse or hemorrhage      Disposition  Final diagnoses:   Closed fracture of multiple ribs of right side, initial encounter     Time reflects when diagnosis was documented in both MDM as applicable and the Disposition within this note     Time User Action Codes Description Comment    6/19/2022 12:50 PM Tad Lakes Add [S22 49XA] Rib fractures     6/19/2022 12:50 PM Page Jorge L A Add [S22 41XA] Closed fracture of multiple ribs of right side, initial encounter     6/19/2022 12:50 PM Tad Lakes Modify [S22 41XA] Closed fracture of multiple ribs of right side, initial encounter     6/19/2022 12:50 PM Faiza Charles [Y04 82IP] Rib fractures       ED Disposition     ED Disposition   Discharge    Condition   Stable    Date/Time   Sun Jun 19, 2022 12:50 PM    Comment   Sofi Rodriguez discharge to home/self care                 Follow-up Information     Follow up With Specialties Details Why Contact Info    Brad Blue DO Family Medicine   [address] [facility]  [phone]      [employer]  Schedule an appointment as soon as possible for a visit   [address] [facility] [address]  N, DO Cardiothoracic Surgery Schedule an appointment as soon as possible for a visit   [address] [facility] [address]  [address] [place] [address]  [phone]            Discharge Medication List as of 6/19/2022 12:54 PM      START taking these medications    Details   oxyCODONE-acetaminophen (PERCOCET) 5-325 mg per tablet Take 1 tablet by mouth every 4 (four) hours as needed for severe pain for up to 7 days Max Daily Amount: 6 tablets, Starting Sun 6/19/2022, Until Sun 6/26/2022 at 2359, Normal         CONTINUE these medications which have NOT CHANGED    Details   Cholecalciferol (VITAMIN D3 PO) Take 50 mcg by mouth daily, Historical Med      Co-Enzyme Q-10 100 MG CAPS Take 1 capsule by mouth 2 (two) times a day, Starting Thu 7/29/2021, Normal      ferrous sulfate 325 (65 Fe) mg tablet Take 325 mg by mouth 2 (two) times a day Pt states he is taking 45mg of iron , Starting Sat 6/5/2021, Until Sun 6/19/2022, Historical Med      gabapentin (Neurontin) 100 mg capsule 100 mg nightly for 1 week, then if needed/tolerated increase to 200 mg nightly, then if needed/tolerated increase to 300 mg nightly, Normal      ibuprofen (MOTRIN) 600 mg tablet Take 600 mg by mouth every 6 (six) hours As needed , Starting Tue 6/8/2021, Until Wed 6/8/2022, Historical Med      losartan-hydrochlorothiazide (HYZAAR) 50-12 5 mg per tablet Take 1 tablet by mouth daily, Starting Mon 4/19/2021, Until Sun 6/19/2022, Historical Med      magnesium oxide (MAG-OX) 400 mg Take 1 tablet (400 mg total) by mouth 2 (two) times a day, Starting Thu 7/29/2021, Normal      Melatonin 5 MG TABS Take by mouth At HS PRN, Historical Med      Multiple Vitamin (multivitamin) capsule Take 1 capsule by mouth daily, Historical Med      Omega-3 1000 MG CAPS Take by mouth daily, Historical Med      sodium chloride 1 g tablet Take 1 g by mouth 2 (two) times a day  , Historical Med      vitamin B-12 (VITAMIN B-12) 1,000 mcg tablet Take by mouth 2 (two) times a day  , Historical Med      vitamin E 100 UNIT capsule Take 100 Units by mouth 2 (two) times a day  , Historical Med             No discharge procedures on file      PDMP Review     None          ED Provider  Electronically Signed by           Aly Hernandez MD  06/19/22 1008
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
## 2022-07-06 ENCOUNTER — TRANSITIONAL CARE MANAGEMENT (OUTPATIENT)
Dept: OBGYN CLINIC | Facility: CLINIC | Age: 67
End: 2022-07-06

## 2022-07-06 ENCOUNTER — TELEPHONE (OUTPATIENT)
Dept: NEUROLOGY | Facility: CLINIC | Age: 67
End: 2022-07-06

## 2022-07-06 NOTE — TELEPHONE ENCOUNTER
Patient is coming in to see Anahi Reinoso on 7/27 please have Adeola KAUFFMAN fill forms out that are scanned in media from [employer] faxed the lov notes to azeb

## 2022-07-07 NOTE — TELEPHONE ENCOUNTER
We won't be able to continue disability if our concussion specialist doesn't feel he needs it  He at least will need to restart part time job unless thinking of long term

## 2022-07-08 NOTE — TELEPHONE ENCOUNTER
Spoke to patient today to let him know Dr Namita Mondragon  is not recommending disability for his migraines and would defer back to his PCP if he still feels like he can not work to see if they can fill out the paperwork he  is  inquiring about  Patient agreed he doesn't need to be out of work for the migraines but explained it is due to the unbalanced feeling he is experiencing  Recommended again he reach out to his PCP

## 2022-07-15 ENCOUNTER — DOCUMENTATION (OUTPATIENT)
Dept: BEHAVIORAL/MENTAL HEALTH CLINIC | Facility: CLINIC | Age: 67
End: 2022-07-15

## 2022-07-15 DIAGNOSIS — F41.9 ANXIETY: ICD-10-CM

## 2022-07-15 DIAGNOSIS — F43.10 PTSD (POST-TRAUMATIC STRESS DISORDER): ICD-10-CM

## 2022-07-15 DIAGNOSIS — F33.1 MODERATE EPISODE OF RECURRENT MAJOR DEPRESSIVE DISORDER (HCC): Primary | ICD-10-CM

## 2022-07-15 NOTE — PROGRESS NOTES
Assessment/Plan:      Diagnoses and all orders for this visit:    Moderate episode of recurrent major depressive disorder (HCC)    Anxiety    PTSD (post-traumatic stress disorder)          Subjective:     Patient ID: Prashanth Hall is a 69 y o  male  Outpatient Discharge Summary:   Admission Date: 03/17/2022  Lena Douglas was referred by Caron Denis  Discharge Date: 05/09/2022    Discharge Diagnosis:    1  Moderate episode of recurrent major depressive disorder ([address] )     2  Anxiety     3   PTSD (post-traumatic stress disorder)         Treating Physician: none  Treatment Complications: head injury  Presenting Problem: depression  Course of treatment includes:    individual therapy   Treatment Progress: fair  Criteria for Discharge: return to primary therapsit upon return form leave  Aftercare recommendations include continued counseling  Discharge Medications include:  Current Outpatient Medications:     Cholecalciferol (VITAMIN D3 PO), Take 50 mcg by mouth daily, Disp: , Rfl:     Co-Enzyme Q-10 100 MG CAPS, Take 1 capsule by mouth 2 (two) times a day (Patient taking differently: Take 1 capsule by mouth in the morning), Disp: 60 capsule, Rfl: 2    ferrous sulfate 325 (65 Fe) mg tablet, Take 325 mg by mouth 2 (two) times a day Pt states he is taking 45mg of iron , Disp: , Rfl:     gabapentin (Neurontin) 100 mg capsule, 100 mg nightly for 1 week, then if needed/tolerated increase to 200 mg nightly, then if needed/tolerated increase to 300 mg nightly (Patient taking differently: daily at bedtime 100 mg nightly for 1 week, then if needed/tolerated increase to 200 mg nightly, then if needed/tolerated increase to 300 mg nightly PT states he is taking 200mg at night), Disp: 90 capsule, Rfl: 4    ibuprofen (MOTRIN) 600 mg tablet, Take 600 mg by mouth every 6 (six) hours As needed , Disp: , Rfl:     losartan-hydrochlorothiazide (HYZAAR) 50-12 5 mg per tablet, Take 1 tablet by mouth daily, Disp: , Rfl:     magnesium oxide (MAG-OX) 400 mg, Take 1 tablet (400 mg total) by mouth 2 (two) times a day, Disp: 60 tablet, Rfl: 1    Melatonin 5 MG TABS, Take by mouth At HS PRN, Disp: , Rfl:     Multiple Vitamin (multivitamin) capsule, Take 1 capsule by mouth daily, Disp: , Rfl:     Omega-3 1000 MG CAPS, Take by mouth daily, Disp: , Rfl:     sodium chloride 1 g tablet, Take 1 g by mouth 2 (two) times a day  , Disp: , Rfl:     vitamin B-12 (VITAMIN B-12) 1,000 mcg tablet, Take by mouth 2 (two) times a day  , Disp: , Rfl:     vitamin E 100 UNIT capsule, Take 100 Units by mouth 2 (two) times a day  , Disp: , Rfl:     Prognosis: fair

## 2022-07-18 ENCOUNTER — TELEPHONE (OUTPATIENT)
Dept: NEUROLOGY | Facility: CLINIC | Age: 67
End: 2022-07-18

## 2022-07-18 NOTE — TELEPHONE ENCOUNTER
Called and spoke to patient to confirm their upcoming appointment with Dr Vini Fox  Informed patient about arriving in the [place] location 15 minutes prior to their appointment to get checked in and going over chart

## 2022-07-27 ENCOUNTER — OFFICE VISIT (OUTPATIENT)
Dept: NEUROLOGY | Facility: CLINIC | Age: 67
End: 2022-07-27
Payer: COMMERCIAL

## 2022-07-27 VITALS
SYSTOLIC BLOOD PRESSURE: 152 MMHG | HEART RATE: 67 BPM | TEMPERATURE: 97.8 F | BODY MASS INDEX: 31.5 KG/M2 | WEIGHT: 220 LBS | HEIGHT: 70 IN | DIASTOLIC BLOOD PRESSURE: 124 MMHG

## 2022-07-27 DIAGNOSIS — G43.009 MIGRAINE WITHOUT AURA AND WITHOUT STATUS MIGRAINOSUS, NOT INTRACTABLE: Primary | ICD-10-CM

## 2022-07-27 DIAGNOSIS — F44.7 FUNCTIONAL NEUROLOGICAL SYMPTOM DISORDER WITH MIXED SYMPTOMS: ICD-10-CM

## 2022-07-27 DIAGNOSIS — G47.33 OSA (OBSTRUCTIVE SLEEP APNEA): ICD-10-CM

## 2022-07-27 DIAGNOSIS — G44.209 TENSION HEADACHE: ICD-10-CM

## 2022-07-27 PROCEDURE — 99215 OFFICE O/P EST HI 40 MIN: CPT | Performed by: PSYCHIATRY & NEUROLOGY

## 2022-07-27 NOTE — PROGRESS NOTES
Review of Systems   Constitutional: Negative for appetite change and fever  HENT: Negative  Negative for hearing loss, tinnitus, trouble swallowing and voice change  Eyes: Negative  Negative for photophobia and pain  Respiratory: Negative  Negative for shortness of breath  Cardiovascular: Negative  Negative for palpitations  Gastrointestinal: Negative  Negative for nausea and vomiting  Endocrine: Negative  Negative for cold intolerance  Genitourinary: Negative  Negative for dysuria, frequency and urgency  Musculoskeletal: Negative  Negative for myalgias and neck pain  Skin: Negative  Negative for rash  Neurological: Positive for dizziness and headaches  Negative for tremors, seizures, syncope, facial asymmetry, speech difficulty, weakness, light-headedness and numbness  "off balance" when changing position   Hematological: Negative  Does not bruise/bleed easily  Psychiatric/Behavioral: Positive for confusion and sleep disturbance  Negative for hallucinations  All other systems reviewed and are negative

## 2022-07-27 NOTE — PROGRESS NOTES
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  Negative for dysuria, frequency and urgency  Musculoskeletal: Negative  Negative for myalgias and neck pain  Skin: Negative  Negative for rash  Neurological: Positive for headaches  Negative for tremors, seizures, syncope, facial asymmetry, speech difficulty, weakness, light-headedness and numbness  Hematological: Negative  Does not bruise/bleed easily  Psychiatric/Behavioral: Positive for sleep disturbance  Negative for hallucinations  All other systems reviewed and are negative  I have spent 32 minutes with Patient  today in which greater than 50% of this time was spent in counseling/coordination of care regarding Diagnostic results, Prognosis, Risks and benefits of tx options, Intructions for management, Patient education, Importance of tx compliance, Risk factor reductions and Impressions  I also spent 10 minutes non face to face for this patient the same day         Author:  Mitra Klein MD 7/27/2022 12:01 PM

## 2022-07-27 NOTE — PATIENT INSTRUCTIONS
Please follow up with sleep medicine for sleep apnea as if it goes untreated it could lead to significant morbidity and mortality  Continue to follow with primary care provider regarding secondary stroke prevention - high blood pressure, high cholesterol     - "The body keeps the score" - great book on PTSD  - I recommend continued counselor for post traumatic stress     Discuss Mood With PCP - consider sertraline     Headache/migraine treatment:   Abortive medications (for immediate treatment of a headache): It is ok to take ibuprofen, acetaminophen or naproxen (Advil, Tylenol,  Aleve, Excedrin) if they help your headaches you should limit these to No more than 3 times a week to avoid medication overuse/rebound headaches  Over the counter preventive supplements for headaches/migraines (if you try, try for 3 months straight)  (to take every day to help prevent headaches - not to take at the time of headache): There are combo pills online of these - none of which regulated by FDA and double check dosing - take appropriate dose only once a day- preventa migraine, migravent, mind ease, migrelief   [x] Magnesium 400mg daily (If any diarrhea or upset stomach, decrease dose  as tolerated)      Prescription preventive medications for headaches/migraines   (to take every day to help prevent headaches - not to take at the time of headache):  [x]  gabapentin can see how you do off of it if you want since you are on the lowest dose right now and certainly can take as needed at night     *Typically these types of medications take time untill you see the benefit, although some may see improvement in days, often it may take weeks, especially if the medication is being titrated up to a beneficial level  Please contact us if there are any concerns or questions regarding the medication  Lifestyle Recommendations:  [x] SLEEP - Maintain a regular sleep schedule: Adults need at least 7-8 hours of uninterrupted a night  Maintain good sleep hygiene:  Going to bed and waking up at consistent times, avoiding excessive daytime naps, avoiding caffeinated beverages in the evening, avoid excessive stimulation in the evening and generally using bed primarily for sleeping  One hour before bedtime would recommend turning lights down lower, decreasing your activity (may read quietly, listen to music at a low volume)  When you get into bed, should eliminate all technology (no texting, emailing, playing with your phone, iPad or tablet in bed)  [x] HYDRATION - Maintain good hydration  Drink  2L of fluid a day (4 typical small water bottles)  [x] DIET - Maintain good nutrition  In particular don't skip meals and try and eat healthy balanced meals regularly  [x] EXERCISE - physical exercise as we all know is good for you in many ways, and not only is good for your heart, but also is beneficial for your mental health, cognitive health and  chronic pain/headaches  I would encourage at the least 5 days of physical exercise weekly for at least 30 minutes  Education and Follow-up  [x] Please call with any questions or concerns  Of course if any new concerning symptoms go to the emergency department    [x] Follow up 3 months, sooner if needed

## 2022-08-03 NOTE — TELEPHONE ENCOUNTER
Returned patient's call and advised of sleep study results  Scheduled CPAP study 11/11/22 [facility]  Instructions provided  Verbalized understanding    Added to wait list

## 2022-09-07 ENCOUNTER — HOSPITAL ENCOUNTER (OUTPATIENT)
Dept: SLEEP CENTER | Facility: CLINIC | Age: 67
Discharge: HOME | End: 2022-09-07
Payer: COMMERCIAL

## 2022-09-07 DIAGNOSIS — G47.33 OSA (OBSTRUCTIVE SLEEP APNEA): ICD-10-CM

## 2022-09-07 PROCEDURE — 95811 POLYSOM 6/>YRS CPAP 4/> PARM: CPT

## 2022-09-07 PROCEDURE — 95811 POLYSOM 6/>YRS CPAP 4/> PARM: CPT | Performed by: INTERNAL MEDICINE

## 2022-09-08 NOTE — PROGRESS NOTES
Sleep Study Documentation    Pre-Sleep Study       Sleep testing procedure explained to patient:YES    Patient napped prior to study:NO    Caffeine:Dayshift worker after 12PM   Caffeine use:YES- coffee  6 to 18 ounces    Alcohol:Dayshift workers after 5PM: Alcohol use:NO    Typical day for patient:YES       Study Documentation    Sleep Study Indications:     Sleep Study: Treatment   Optimal PAP pressure: 6cm  Leak:None  Snore:Eliminated  REM Obtained:yes  Supplemental O2: no    Minimum SaO2 91%  Baseline SaO2 95%  PAP mask tried (list all)Resmed N30  PAP mask choice (final)Resmed N30  PAP mask type:nasal  PAP pressure at which snoring was eliminated 5cm  Minimum SaO2 at final PAP pressure 91%  Mode of Therapy:CPAP  ETCO2:No  CPAP changed to BiPAP:No    Mode of Therapy:CPAP    EKG abnormalities: no     EEG abnormalities: no    Sleep Study Recorded < 2 hours: N/A    Sleep Study Recorded > 2 hours but incomplete study: N/A    Sleep Study Recorded 6 hours but no sleep obtained: No    Patient classification: retired       Post-Sleep Study    Medication used at bedtime or during sleep study:NO    Patient reports time it took to fall asleep:30 to 60 minutes    Patient reports waking up during study:1 to 2 times  Patient reports returning to sleep in 10 to 30 minutes  Patient reports sleeping 2 to 4 hours with dreaming  Patient reports sleep during study:better than usual    Patient rated sleepiness: Somewhat sleepy or tired    PAP treatment:yes: Post PAP treatment patient reports feeling better and  would wear PAP mask at home

## 2022-09-09 DIAGNOSIS — G47.33 OSA (OBSTRUCTIVE SLEEP APNEA): Primary | ICD-10-CM

## 2022-09-13 ENCOUNTER — TELEPHONE (OUTPATIENT)
Dept: SLEEP CENTER | Facility: CLINIC | Age: 67
End: 2022-09-13

## 2022-09-13 NOTE — TELEPHONE ENCOUNTER
CPAP study completed and Dr Keena Rodriguez ordered APAP      Patient scheduled for  DME set up and compliance appointments    RX and clinicals sent to [facility]

## 2022-09-16 LAB
DME PARACHUTE DELIVERY DATE REQUESTED: NORMAL
DME PARACHUTE DELIVERY NOTE: NORMAL
DME PARACHUTE ITEM DESCRIPTION: NORMAL
DME PARACHUTE ORDER STATUS: NORMAL
DME PARACHUTE SUPPLIER NAME: NORMAL
DME PARACHUTE SUPPLIER PHONE: NORMAL

## 2022-09-23 LAB
DME PARACHUTE DELIVERY DATE EXPECTED: NORMAL
DME PARACHUTE DELIVERY DATE REQUESTED: NORMAL
DME PARACHUTE DELIVERY NOTE: NORMAL
DME PARACHUTE ITEM DESCRIPTION: NORMAL
DME PARACHUTE ORDER STATUS: NORMAL
DME PARACHUTE SUPPLIER NAME: NORMAL
DME PARACHUTE SUPPLIER PHONE: NORMAL

## 2022-09-27 ENCOUNTER — TELEPHONE (OUTPATIENT)
Dept: SLEEP CENTER | Facility: CLINIC | Age: 67
End: 2022-09-27

## 2022-09-27 LAB
DME PARACHUTE DELIVERY DATE ACTUAL: NORMAL
DME PARACHUTE DELIVERY DATE EXPECTED: NORMAL
DME PARACHUTE DELIVERY DATE REQUESTED: NORMAL
DME PARACHUTE DELIVERY NOTE: NORMAL
DME PARACHUTE ITEM DESCRIPTION: NORMAL
DME PARACHUTE ORDER STATUS: NORMAL
DME PARACHUTE SUPPLIER NAME: NORMAL
DME PARACHUTE SUPPLIER PHONE: NORMAL

## 2022-11-08 ENCOUNTER — OFFICE VISIT (OUTPATIENT)
Dept: PULMONOLOGY | Facility: CLINIC | Age: 67
End: 2022-11-08

## 2022-11-08 VITALS
HEIGHT: 70 IN | OXYGEN SATURATION: 99 % | DIASTOLIC BLOOD PRESSURE: 70 MMHG | SYSTOLIC BLOOD PRESSURE: 124 MMHG | HEART RATE: 57 BPM | TEMPERATURE: 97.8 F | BODY MASS INDEX: 31.57 KG/M2

## 2022-11-08 DIAGNOSIS — G47.33 OSA (OBSTRUCTIVE SLEEP APNEA): Primary | ICD-10-CM

## 2022-11-08 DIAGNOSIS — G47.00 INSOMNIA, UNSPECIFIED TYPE: ICD-10-CM

## 2022-11-08 DIAGNOSIS — I10 ESSENTIAL HYPERTENSION: ICD-10-CM

## 2022-11-08 NOTE — ASSESSMENT & PLAN NOTE
· Mynor Jalloh has severe obstructive sleep apnea with AHI 44 8 events per hour  · He is compliant using his CPAP machine he has a Blaise Golder has been using it every night averaging 7 hours 50 minutes per night with minimal residual AHI of 2  · He has some exhalation difficulties, already checked his flex which is set at 3  · He has a nasal pillows mask, consideration to change to fullface  · I explained to the patient in details the pathophysiology of obstructive sleep apnea  I also explained the importance of treatment, and the consequences of untreated obstructive sleep apnea on underlying cardiovascular and cerebrovascular risk, morbidity, and mortality

## 2022-11-08 NOTE — ASSESSMENT & PLAN NOTE
His blood pressure is definitely better than previous visits he tells me that his blood pressure has been better than before

## 2022-11-08 NOTE — PATIENT INSTRUCTIONS
HOW TO CLEAN YOUR AUTO CPAP MACHINE    Mask: Clean the cushion of your mask daily  Dish soap and warm water     Alexei Plaza eligible for mask cushions every 3 months)    Headgear: Once a week  I find when you wash it daily it eats the material of the Velcro faster     (eligible for new headgear every 6 months)    Heated Tubing: Clean the tube every 3 days  One drop of dish soap run warm water through the tube then hang it over your shower curtain and let it drip dry  (eligible every 3 months)    Humidifier: Rinse out every 1-3 days   Dish soap warm water or , top shelf  (eligible every 6 months) **DISTILLED WATER ONLY**
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
## 2023-02-01 ENCOUNTER — TELEPHONE (OUTPATIENT)
Dept: NEUROLOGY | Facility: CLINIC | Age: 68
End: 2023-02-01

## 2023-02-01 NOTE — TELEPHONE ENCOUNTER
Called patient and left voicemail to confirm their upcoming appointment with Dr Filipe Regalado  Informed patient about arriving in the [place] location 15 minutes prior to appointment to get checked in and go over chart

## 2023-06-16 ENCOUNTER — TELEPHONE (OUTPATIENT)
Dept: NEUROLOGY | Facility: CLINIC | Age: 68
End: 2023-06-16

## 2023-06-16 NOTE — TELEPHONE ENCOUNTER
Patient called to reschedule appointment with Dr Natalya Gallegos that he missed on 2/8/23  Patient is requesting a sooner appointment  He needs help for TBI and wants help on getting HBOT therapy  Is there a sooner appointment to offer patient? Dr Natalya Gallegos next available is in November Thanks

## 2023-06-28 ENCOUNTER — OFFICE VISIT (OUTPATIENT)
Dept: NEUROLOGY | Facility: CLINIC | Age: 68
End: 2023-06-28
Payer: MEDICARE

## 2023-06-28 VITALS
BODY MASS INDEX: 31.5 KG/M2 | HEART RATE: 54 BPM | DIASTOLIC BLOOD PRESSURE: 91 MMHG | WEIGHT: 220 LBS | SYSTOLIC BLOOD PRESSURE: 144 MMHG | HEIGHT: 70 IN

## 2023-06-28 DIAGNOSIS — F43.10 PTSD (POST-TRAUMATIC STRESS DISORDER): ICD-10-CM

## 2023-06-28 DIAGNOSIS — G43.009 MIGRAINE WITHOUT AURA AND WITHOUT STATUS MIGRAINOSUS, NOT INTRACTABLE: ICD-10-CM

## 2023-06-28 DIAGNOSIS — G93.2 IIH (IDIOPATHIC INTRACRANIAL HYPERTENSION): Primary | ICD-10-CM

## 2023-06-28 PROCEDURE — 99214 OFFICE O/P EST MOD 30 MIN: CPT | Performed by: PSYCHIATRY & NEUROLOGY

## 2023-06-28 RX ORDER — ACETAZOLAMIDE 250 MG/1
TABLET ORAL
Qty: 120 TABLET | Refills: 5 | Status: SHIPPED | OUTPATIENT
Start: 2023-06-28

## 2023-06-28 NOTE — PROGRESS NOTES
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       No current facility-administered medications for this visit          Allergies:    No Known Allergies    Family History:     Family History   Problem Relation Age of Onset   • No Known Problems Mother    • No Known Problems Father        Social History:     Social History     Socioeconomic History   • Marital status: Single     Spouse name: Not on file   • Number of children: Not on file   • Years of education: Not on file   • Highest education level: Not on file   Occupational History   • Not on file   Tobacco Use   • Smoking status: Never   • Smokeless tobacco: Never   Vaping Use   • Vaping Use: Never used   Substance and Sexual Activity   • Alcohol use: Yes     Comment: occasional    • Drug use: No   • Sexual activity: Not on file   Other Topics Concern   • Not on file   Social History Narrative   • Not on file     Social Determinants of Health     Financial Resource Strain: Not on file   Food Insecurity: Not on file   Transportation Needs: Not on file   Physical Activity: Not on file   Stress: Not on file   Social Connections: Not on file   Intimate Partner Violence: Not on file   Housing Stability: Not on file         Objective:       Physical Exam: "                           Vitals:            Constitutional:    /91 (BP Location: Right arm, Patient Position: Sitting, Cuff Size: Standard)   Pulse (!) 54   Ht 5' 10\" (1 778 m)   Wt 99 8 kg (220 lb)   BMI 31 57 kg/m²   BP Readings from Last 3 Encounters:   06/28/23 144/91   11/08/22 124/70   07/27/22 (!) 152/124     Pulse Readings from Last 3 Encounters:   06/28/23 (!) 54   11/08/22 57   07/27/22 67         Well developed, well nourished, well groomed  Psychiatric:  Normal behavior and appropriate affect        Neurological Examination:     Mental status/cognitive function:    Normal language and spontaneous speech  Closes his eyes a lot when overwhelmed  Cranial Nerves:   VII-facial expression symmetric  Motor Exam: symmetric bulk throughout  no atrophy, fasciculations or abnormal movements noted  Coordination:  no apparent dysmetria, ataxia or tremor noted  Gait: steady casual gait         Pertinent lab results:  See EMR for recent labs  -   08/05/2021 CMP  Unremarkable except for carbon dioxide 32  CBC  Unremarkable except for  Platelets 592     - 5/27/2021 vitamin-D 35   - 7/17/2020 TSH  Elevated 3 8, T4 normal 1 08     Imaging: I have personally reviewed imaging and radiology read   -  MRI brain without contrast 08/25/2021:  Ventricles are prominent    Punctate linear foci of susceptibility artifact are seen in the bilateral posterior frontoparietal subcortical region potentially sequela of prior microhemorrhage as can be seen in the setting of trauma    Chronic lacunar infarct left centrum semiovale  - MRI C-spine without contrast 08/25/2021:  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 at C4-5 and multilevel mild central and foraminal narrowing elsewhere as described  Nany Augustinh is no cord compression or cord signal abnormality  Review of Systems:   ROS obtained by medical assistant Personally reviewed and updated if indicated   I recommended " PCP follow up for non neurologic problems  Review of Systems   Constitutional: Negative for appetite change and fever  HENT: Negative  Negative for hearing loss, tinnitus, trouble swallowing and voice change  Eyes: Positive for visual disturbance  Negative for photophobia and pain  Respiratory: Negative  Negative for shortness of breath  Cardiovascular: Negative  Negative for palpitations  Gastrointestinal: Negative  Negative for nausea and vomiting  Endocrine: Negative  Negative for cold intolerance  Genitourinary: Negative  Negative for dysuria, frequency and urgency  Musculoskeletal: Negative  Negative for myalgias and neck pain  Skin: Negative  Negative for rash  Neurological: Positive for headaches  Negative for dizziness, tremors, seizures, syncope, facial asymmetry, speech difficulty, weakness, light-headedness and numbness  Off balance feeling   Hematological: Negative  Does not bruise/bleed easily  Psychiatric/Behavioral: Positive for sleep disturbance  Negative for confusion and hallucinations  Trouble going to sleep   All other systems reviewed and are negative  I have spent 30 minutes with Patient  today in which greater than 50% of this time was spent in counseling/coordination of care regarding Diagnostic results, Prognosis, Risks and benefits of tx options, Instructions for management, Patient and family education, Importance of tx compliance, Risk factor reductions, Impressions, Counseling / Coordination of care, Documenting in the medical record, Reviewing / ordering tests, medicine, procedures   and Obtaining or reviewing history    I also spent 5 minutes non face to face for this patient the same day         Author:  Nimo Zuluaga MD 6/28/2023 5:07 PM

## 2023-06-28 NOTE — PROGRESS NOTES
Review of Systems   Constitutional: Negative for appetite change and fever  HENT: Negative  Negative for hearing loss, tinnitus, trouble swallowing and voice change  Eyes: Positive for visual disturbance  Negative for photophobia and pain  Respiratory: Negative  Negative for shortness of breath  Cardiovascular: Negative  Negative for palpitations  Gastrointestinal: Negative  Negative for nausea and vomiting  Endocrine: Negative  Negative for cold intolerance  Genitourinary: Negative  Negative for dysuria, frequency and urgency  Musculoskeletal: Negative  Negative for myalgias and neck pain  Skin: Negative  Negative for rash  Neurological: Positive for headaches  Negative for dizziness, tremors, seizures, syncope, facial asymmetry, speech difficulty, weakness, light-headedness and numbness  Off balance feeling   Hematological: Negative  Does not bruise/bleed easily  Psychiatric/Behavioral: Positive for sleep disturbance  Negative for confusion and hallucinations  Trouble going to sleep   All other systems reviewed and are negative

## 2023-06-28 NOTE — PATIENT INSTRUCTIONS
"Glad to see they are rechecking your labs as I would want to make sure your potassium does not drop while on acetazolamide and hydrochlorothiazide and if it did we would stop the hydrochlorothiazide and just continue on the losartan without the combo pill  As we discussed I tried to write your primary doctor but since he is in the other system it would not let me, but this note will go to him  When you do see the eye doctor in October or November please let me know if they see any changes specifically if any signs of idiopathic intracranial hypertension or papilledema or swelling behind your eyes and do not trust that they will send me the report  Please continue follow up with sleep medicine for sleep apnea as if it goes untreated it could lead to significant morbidity and mortality  Continue to follow with primary care provider regarding secondary stroke prevention - high blood pressure, high cholesterol     - \"The body keeps the score\" - great book on PTSD  - I recommend continued counselor for post traumatic stress     Discuss Mood With PCP - consider sertraline     Headache/migraine treatment:   Abortive medications (for immediate treatment of a headache): It is ok to take ibuprofen, acetaminophen or naproxen (Advil, Tylenol,  Aleve, Excedrin) if they help your headaches you should limit these to No more than 3 times a week to avoid medication overuse/rebound headaches  Over the counter preventive supplements for headaches/migraines (if you try, try for 3 months straight)  (to take every day to help prevent headaches - not to take at the time of headache):   There are combo pills online of these - none of which regulated by FDA and double check dosing - take appropriate dose only once a day- preventa migraine, migravent, mind ease, migrelief   [x] Magnesium 400mg daily (If any diarrhea or upset stomach, decrease dose  as tolerated)        Prescription preventive medications for " headaches/migraines   (to take every day to help prevent headaches - not to take at the time of headache):  [x]  I suggest trial of lower dose diamox than we typically use in more severe cases -  -     acetaZOLAMIDE (DIAMOX) 125 mg tablet; 125 mg PO nightly for 1 week, then increase to 125 mg  in am and in pm for 1 week, then 125 mg in am and 250 mg in pm for 1 week, then 250 mg in am and in pm        -If you had papilledema or swelling behind your eyes we would rapidly get you up to 500 mg twice a day and may need to go higher  I have 1 patient who is up to 3000 mg in a day just for reference and I will be starting you on 125 mg       - if a lower dose is helpful, can stay lower, if higher dose causes side effects, go back to last tolerated dose  If you get all the way up to the dose recommended and is not helping enough let me know as I will absolutely go higher     - make sure to stay hydrated while on this as can cause dehydration since that is it's purpose to take fluid off    - most common side effect is tingling of the nerves at times  - can cause electrolyte disturbances, but not typically in any significant way  However, be cautious if taking with other meds that lower potassium like hydrochlorothiazide etc    *Typically these types of medications take time untill you see the benefit, although some may see improvement in days, often it may take weeks, especially if the medication is being titrated up to a beneficial level  Please contact us if there are any concerns or questions regarding the medication  Lifestyle Recommendations:  [x] SLEEP - Maintain a regular sleep schedule: Adults need at least 7-8 hours of uninterrupted a night  Maintain good sleep hygiene:  Going to bed and waking up at consistent times, avoiding excessive daytime naps, avoiding caffeinated beverages in the evening, avoid excessive stimulation in the evening and generally using bed primarily for sleeping    One hour before bedtime would recommend turning lights down lower, decreasing your activity (may read quietly, listen to music at a low volume)  When you get into bed, should eliminate all technology (no texting, emailing, playing with your phone, iPad or tablet in bed)  [x] HYDRATION - Maintain good hydration  Drink  2L of fluid a day (4 typical small water bottles)  [x] DIET - Maintain good nutrition  In particular don't skip meals and try and eat healthy balanced meals regularly  [x] EXERCISE - physical exercise as we all know is good for you in many ways, and not only is good for your heart, but also is beneficial for your mental health, cognitive health and  chronic pain/headaches  I would encourage at the least 5 days of physical exercise weekly for at least 30 minutes  Education and Follow-up  [x] Please call with any questions or concerns  Of course if any new concerning symptoms go to the emergency department    [x] Follow up 3 months, sooner if needed

## 2023-09-07 ENCOUNTER — TELEPHONE (OUTPATIENT)
Dept: NEUROLOGY | Facility: CLINIC | Age: 68
End: 2023-09-07

## 2023-09-07 NOTE — TELEPHONE ENCOUNTER
Patient left voicemail with questions regarding acetazolamide prescription.     Call returned to patient, [phone].     Current dose of acetazolamide is 500mg BID. Reports no difference in headaches since starting medication.     Asking how long he should continue to take medication for?

## 2023-09-07 NOTE — TELEPHONE ENCOUNTER
his headaches were actually not as much of his issue when I last saw him as they were only 1 to 2/week he is more having significant photophobia and vision changes, is this any better?    What time of day is he taking the medication?   He could switch to 250 mg every 4-5 hours while awake to see if spitting up the medication during the day helps more

## 2023-09-08 NOTE — TELEPHONE ENCOUNTER
Called pt.  Made him aware of below.  He thinks that photophobia and vision changes have gotten a little bit better but seems to have tapered off.      He will trying taking 250mg every 4-5 hours and see how he does

## 2023-09-26 ENCOUNTER — TELEPHONE (OUTPATIENT)
Dept: NEUROLOGY | Facility: CLINIC | Age: 68
End: 2023-09-26

## 2023-09-26 NOTE — TELEPHONE ENCOUNTER
Called patient and left voicemail to confirm their upcoming appointment with Dr. Tereza Sotelo. Informed patient about arriving in the [place] location 15 minutes prior to appointment to get checked in and go over chart.

## 2023-10-02 ENCOUNTER — OFFICE VISIT (OUTPATIENT)
Dept: NEUROLOGY | Facility: CLINIC | Age: 68
End: 2023-10-02
Payer: MEDICARE

## 2023-10-02 VITALS
DIASTOLIC BLOOD PRESSURE: 84 MMHG | SYSTOLIC BLOOD PRESSURE: 141 MMHG | BODY MASS INDEX: 31.64 KG/M2 | WEIGHT: 221 LBS | HEART RATE: 60 BPM | TEMPERATURE: 99 F | HEIGHT: 70 IN

## 2023-10-02 DIAGNOSIS — G44.329 CHRONIC POST-TRAUMATIC HEADACHE: Primary | ICD-10-CM

## 2023-10-02 DIAGNOSIS — G43.009 MIGRAINE WITHOUT AURA AND WITHOUT STATUS MIGRAINOSUS, NOT INTRACTABLE: ICD-10-CM

## 2023-10-02 PROCEDURE — 99215 OFFICE O/P EST HI 40 MIN: CPT | Performed by: PSYCHIATRY & NEUROLOGY

## 2023-10-02 RX ORDER — ACETAZOLAMIDE 250 MG/1
TABLET ORAL
Qty: 300 TABLET | Refills: 5 | Status: SHIPPED | OUTPATIENT
Start: 2023-10-02

## 2023-10-02 NOTE — PROGRESS NOTES
Review of Systems   Constitutional: Negative for appetite change, fatigue and fever. HENT: Negative. Negative for hearing loss, tinnitus, trouble swallowing and voice change. Eyes: Negative. Negative for photophobia, pain and visual disturbance. Respiratory: Negative. Negative for shortness of breath. Cardiovascular: Negative. Negative for palpitations. Gastrointestinal: Negative. Negative for nausea and vomiting. Endocrine: Negative. Negative for cold intolerance. Genitourinary: Negative. Negative for dysuria, frequency and urgency. Musculoskeletal: Negative for back pain, gait problem, myalgias and neck pain. Skin: Negative. Negative for rash. Allergic/Immunologic: Negative. Neurological: Positive for headaches. Negative for dizziness, tremors, seizures, syncope, facial asymmetry, speech difficulty, weakness, light-headedness and numbness. Hematological: Negative. Does not bruise/bleed easily. Psychiatric/Behavioral: Negative. Negative for confusion, hallucinations and sleep disturbance.

## 2023-10-02 NOTE — PATIENT INSTRUCTIONS
*Glad to see that your primary care doctor is following your labs and that your potassium and comprehensive metabolic panel was okay 2/03/6915  -I will add a CMP to your next set of labs since you are going to be getting a PSA soon anyway, try and wait for a few weeks on this medication first  - Please make sure that your primary doctor knows that you are on acetazolamide as I agree if your blood pressure is better on acetazolamide you may not need hydrochlorothiazide especially if they are both potentially can lower your potassium      Fall precautions     Please continue follow up with sleep medicine for sleep apnea as if it goes untreated it could lead to significant morbidity and mortality. Continue to follow with primary care provider regarding secondary stroke prevention - high blood pressure, high cholesterol     "Rewire Your Anxious Brain: How to Use the Neuroscience of Fear to End Anxiety, Panic, and Worry" By Simeon Cortes PhD    - I recommend continued counselor for post traumatic stress       At some point, no pressure, consider following up for hearing test because if you do need hearing aids not wearing them could increase risk of dementia    Headache/migraine treatment:   Abortive medications (for immediate treatment of a headache): It is ok to take ibuprofen, acetaminophen or naproxen (Advil, Tylenol,  Aleve, Excedrin) if they help your headaches you should limit these to No more than 3 times a week to avoid medication overuse/rebound headaches. Over the counter preventive supplements for headaches/migraines (if you try, try for 3 months straight)  (to take every day to help prevent headaches - not to take at the time of headache):   There are combo pills online of these - none of which regulated by FDA and double check dosing - take appropriate dose only once a day- preventa migraine, migravent, mind ease, migrelief   [x] Magnesium 400mg daily (If any diarrhea or upset stomach, decrease dose  as tolerated)        Prescription preventive medications for headaches/migraines   (to take every day to help prevent headaches - not to take at the time of headache):  [x]  I suggest trial of lower dose diamox than we typically use in more severe cases -  -     acetaZOLAMIDE (DIAMOX)    Continue 250 mg at 7 AM  250 mg at 11 AM  250 mg at 3 PM  250 mg at 7 PM    When you awaken at night please take 250 mg overnight for wearing off effect, do not specifically wake up to take it    In 1 week after the above dose if you still feel like you are having increased pressure as well as other symptoms, okay to increase one of the 4 doses up to 500 mg each week until you reach max 2000 mg during the day and then check in with me or check in sooner if you have any issues with this    Max 4000 mg in a day        -If you had papilledema or swelling behind your eyes we would rapidly get you up to 500 mg twice a day and may need to go higher. I have 1 patient who is up to 3000 mg in a day just for reference and I will be starting you on 125 mg.      - if a lower dose is helpful, can stay lower, if higher dose causes side effects, go back to last tolerated dose. If you get all the way up to the dose recommended and is not helping enough let me know as I will absolutely go higher.    - make sure to stay hydrated while on this as can cause dehydration since that is it's purpose to take fluid off.   - most common side effect is tingling of the nerves at times  - can cause electrolyte disturbances, but not typically in any significant way. However, be cautious if taking with other meds that lower potassium like hydrochlorothiazide etc    *Typically these types of medications take time untill you see the benefit, although some may see improvement in days, often it may take weeks, especially if the medication is being titrated up to a beneficial level. Please contact us if there are any concerns or questions regarding the medication. Lifestyle Recommendations:  [x] SLEEP - Maintain a regular sleep schedule: Adults need at least 7-8 hours of uninterrupted a night. Maintain good sleep hygiene:  Going to bed and waking up at consistent times, avoiding excessive daytime naps, avoiding caffeinated beverages in the evening, avoid excessive stimulation in the evening and generally using bed primarily for sleeping. One hour before bedtime would recommend turning lights down lower, decreasing your activity (may read quietly, listen to music at a low volume). When you get into bed, should eliminate all technology (no texting, emailing, playing with your phone, iPad or tablet in bed). [x] HYDRATION - Maintain good hydration. Drink  2L of fluid a day (4 typical small water bottles)  [x] DIET - Maintain good nutrition. In particular don't skip meals and try and eat healthy balanced meals regularly. [x] EXERCISE - physical exercise as we all know is good for you in many ways, and not only is good for your heart, but also is beneficial for your mental health, cognitive health and  chronic pain/headaches. I would encourage at the least 5 days of physical exercise weekly for at least 30 minutes. Education and Follow-up  [x] Please call with any questions or concerns. Of course if any new concerning symptoms go to the emergency department.   [x] Follow up 3 months, sooner if needed

## 2023-10-02 NOTE — PROGRESS NOTES
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 feels like he could lay   Problems falling asleep?:   Yes  Problems staying asleep?:  Yes    The following portions of the patient's history were reviewed and updated as appropriate: allergies, current medications, past family history, past medical history, past social history, past surgical history and problem list.    Pertinent family history:  Family history of headaches:  migraine headaches in sister    Past Medical History:     Past Medical History:   Diagnosis Date   • Conversion disorder    • Hypertension    • PTSD (post-traumatic stress disorder)        Patient Active Problem List   Diagnosis   • Essential hypertension   • Osteoarthritis of right knee   • S/P total knee arthroplasty, right   • Dyspnea   • Chronic cough   • Moderate episode of recurrent major depressive disorder (HCC)   • Anxiety   • PTSD (post-traumatic stress disorder)   • KASI (obstructive sleep apnea)   • Insomnia   • Dissection of thoracoabdominal aorta (HCC)   • Spleen hematoma       Medications:      Current Outpatient Medications   Medication Sig Dispense Refill   • acetaZOLAMIDE (DIAMOX) 250 mg tablet 250 mg p.o. at 7 AM, 11 AM, 3 PM, 7 PM and 250 mg overnight for wearing off if awakens,  in 1 week add 250 mg to any of the 5 doses and can continue to do so each week until you are on max to 500 mg 5 times a day 300 tablet 5   • Cholecalciferol (VITAMIN D3 PO) Take 50 mcg by mouth daily     • Co-Enzyme Q-10 100 MG CAPS Take 1 capsule by mouth 2 (two) times a day (Patient taking differently: Take 2 capsules by mouth in the morning) 60 capsule 2   • ferrous sulfate 325 (65 Fe) mg tablet Take 325 mg by mouth 2 (two) times a day Pt states he is taking 45mg of iron      • ibuprofen (MOTRIN) 600 mg tablet Take 600 mg by mouth every 6 (six) hours As needed      • losartan-hydrochlorothiazide (HYZAAR) 50-12.5 mg per tablet Take 1 tablet by mouth daily     • magnesium oxide (MAG-OX) 400 mg Take 1 tablet (400 mg total) by mouth 2 (two) times a day 60 tablet 1   • Multiple Vitamin (multivitamin) capsule Take 1 capsule by mouth daily     • Omega-3 1000 MG CAPS Take by mouth daily     • vitamin B-12 (VITAMIN B-12) 1,000 mcg tablet Take by mouth 2 (two) times a day       • vitamin E 100 UNIT capsule Take 100 Units by mouth 2 (two) times a day       • Melatonin 5 MG TABS Take by mouth At HS PRN (Patient not taking: Reported on 6/28/2023)     • sodium chloride 1 g tablet Take 1 g by mouth 2 (two) times a day   (Patient not taking: Reported on 11/8/2022)       No current facility-administered medications for this visit.         Allergies:    No Known Allergies    Family History:     Family History   Problem Relation Age of Onset   • No Known Problems Mother    • No Known Problems Father        Social History:     Social History     Socioeconomic History   • Marital status: Single     Spouse name: Not on file   • Number of children: Not on file   • Years of education: Not on file   • Highest education level: Not on file   Occupational History   • Not on file   Tobacco Use   • Smoking status: Never   • Smokeless tobacco: Never   Vaping Use   • Vaping Use: Never used   Substance and Sexual Activity   • Alcohol use: Yes     Comment: occasional    • Drug use: No   • Sexual activity: Not on file   Other Topics Concern   • Not on file   Social History Narrative   • Not on file     Social Determinants of Health     Financial Resource Strain: Not on file   Food Insecurity: Not on file   Transportation Needs: Not on file   Physical Activity: Not on file   Stress: Not on file   Social Connections: Not on file   Intimate Partner Violence: Not on file   Housing Stability: Not on file         Objective:       Physical Exam:                                                                 Vitals:            Constitutional:    /84 (BP Location: Left arm, Patient Position: Sitting, Cuff Size: Standard)   Pulse 60   Temp 99 °F (37.2 °C) (Temporal)   Ht 5' 10" (1.778 m)   Wt 100 kg (221 lb)   BMI 31.71 kg/m²   BP Readings from Last 3 Encounters:   10/02/23 141/84   06/28/23 144/91   11/08/22 124/70     Pulse Readings from Last 3 Encounters:   10/02/23 60   06/28/23 (!) 54   11/08/22 57         Well developed, well nourished, well groomed. Psychiatric:  Normal behavior and appropriate affect, better       Neurological Examination:     Mental status/cognitive function:    Normal language and spontaneous speech. More attentive  Cranial Nerves:   VII-facial expression symmetric  Motor Exam: symmetric bulk throughout. no atrophy, fasciculations or abnormal movements noted.    Coordination:  no apparent dysmetria, ataxia or tremor noted  Gait: steady casual gait          Pertinent lab results:  See EMR for recent labs  -   08/05/2021 CMP  Unremarkable except for carbon dioxide 32  CBC  Unremarkable except for  Platelets 523     - 5/27/2021 vitamin-D 35   - 7/17/2020 TSH  Elevated 3.8, T4 normal 1.08     Imaging: I have personally reviewed imaging and radiology read   -  MRI brain without contrast 08/25/2021:  Ventricles are prominent.   Punctate linear foci of susceptibility artifact are seen in the bilateral posterior frontoparietal subcortical region potentially sequela of prior microhemorrhage as can be seen in the setting of trauma.   Chronic lacunar infarct left centrum semiovale. - MRI C-spine without contrast 08/25/2021:  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 at C4-5 and multilevel mild central and foraminal narrowing elsewhere as described. Anisah Fusi is no cord compression or cord signal abnormality  Review of Systems:   ROS obtained by medical assistant and personally reviewed, but if any symptoms listed below say negative, does not mean patient has not had this symptom since last visit, please see HPI for details of symptoms discussed this visit. I recommended PCP follow up for non neurologic problems. Review of Systems   Constitutional: Negative for appetite change, fatigue and fever. HENT: Negative. Negative for hearing loss, tinnitus, trouble swallowing and voice change. Eyes: Negative. Negative for photophobia, pain and visual disturbance. Respiratory: Negative. Negative for shortness of breath. Cardiovascular: Negative. Negative for palpitations. Gastrointestinal: Negative. Negative for nausea and vomiting. Endocrine: Negative. Negative for cold intolerance. Genitourinary: Negative. Negative for dysuria, frequency and urgency. Musculoskeletal: Negative for back pain, gait problem, myalgias and neck pain. Skin: Negative. Negative for rash. Allergic/Immunologic: Negative. Neurological: Positive for headaches. Negative for dizziness, tremors, seizures, syncope, facial asymmetry, speech difficulty, weakness, light-headedness and numbness. Hematological: Negative. Does not bruise/bleed easily. Psychiatric/Behavioral: Negative. Negative for confusion, hallucinations and sleep disturbance.      I have spent 40 minutes with Patient  today in which greater than 50% of this time was spent in counseling/coordination of care regarding Diagnostic results, Prognosis, Risks and benefits of tx options, Instructions for management, Patient education, Importance of tx compliance, Risk factor reductions, Impressions, Counseling / Coordination of care, Documenting in the medical record, Reviewing / ordering tests, medicine, procedures   and Obtaining or reviewing history  . I also spent 4 minutes non face to face for this patient the same day.        Author:  Kimberly Vazquez MD 10/2/2023 5:27 PM

## 2023-12-14 ENCOUNTER — TELEPHONE (OUTPATIENT)
Dept: NEUROLOGY | Facility: CLINIC | Age: 68
End: 2023-12-14

## 2023-12-14 NOTE — TELEPHONE ENCOUNTER
Patient is currently in [facility] and needs his lab request to be faxed to [address] in [address] [place] [address].   Please assist

## 2024-01-02 ENCOUNTER — TELEPHONE (OUTPATIENT)
Dept: NEUROLOGY | Facility: CLINIC | Age: 69
End: 2024-01-02

## 2024-01-02 NOTE — TELEPHONE ENCOUNTER
Called patient and left voicemail to confirm their upcoming appointment with Dr. Moore. Informed patient about arriving in the [place] location 15 minutes prior to appointment to get checked in and go over chart.

## 2024-01-08 ENCOUNTER — OFFICE VISIT (OUTPATIENT)
Dept: NEUROLOGY | Facility: CLINIC | Age: 69
End: 2024-01-08
Payer: MEDICARE

## 2024-01-08 VITALS
HEART RATE: 58 BPM | TEMPERATURE: 98.3 F | BODY MASS INDEX: 30.48 KG/M2 | SYSTOLIC BLOOD PRESSURE: 144 MMHG | HEIGHT: 70 IN | OXYGEN SATURATION: 99 % | DIASTOLIC BLOOD PRESSURE: 75 MMHG | WEIGHT: 212.9 LBS

## 2024-01-08 DIAGNOSIS — G44.329 CHRONIC POST-TRAUMATIC HEADACHE, NOT INTRACTABLE: Primary | ICD-10-CM

## 2024-01-08 DIAGNOSIS — R26.89 IMBALANCE: ICD-10-CM

## 2024-01-08 DIAGNOSIS — G43.009 MIGRAINE WITHOUT AURA AND WITHOUT STATUS MIGRAINOSUS, NOT INTRACTABLE: ICD-10-CM

## 2024-01-08 PROCEDURE — 99215 OFFICE O/P EST HI 40 MIN: CPT | Performed by: PSYCHIATRY & NEUROLOGY

## 2024-01-08 RX ORDER — ACETAZOLAMIDE 500 MG/1
500 CAPSULE, EXTENDED RELEASE ORAL 2 TIMES DAILY
Qty: 60 CAPSULE | Refills: 4 | Status: SHIPPED | OUTPATIENT
Start: 2024-01-08
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
## 2024-01-08 NOTE — PROGRESS NOTES
Review of Systems   Constitutional: Negative.    HENT: Negative.     Eyes: Negative.    Respiratory: Negative.     Cardiovascular: Negative.    Gastrointestinal: Negative.    Endocrine: Negative.    Genitourinary: Negative.    Musculoskeletal:  Positive for gait problem (off balance).   Skin: Negative.    Allergic/Immunologic: Negative.    Neurological:  Positive for headaches.   Hematological: Negative.    Psychiatric/Behavioral: Negative.

## 2024-01-08 NOTE — PATIENT INSTRUCTIONS
"  Referral to PT placed     Please follow up with sleep medicine regarding the sleep apnea and the CPAP machine with Dr. Ortiz as I think you have not seen him from 11/2022    Do follow-up with your primary care doctor regarding elevated PSA    -I will add a CMP to your next set of labs, try and wait for a few weeks on this medication first  - Please make sure that your primary doctor knows that you are on acetazolamide as I agree if your blood pressure is better on acetazolamide you may not need hydrochlorothiazide especially if they are both potentially can lower your potassium  -Continue to follow with primary care provider regarding secondary stroke prevention - high blood pressure, high cholesterol     Fall precautions       - \"Rewire Your Anxious Brain: How to Use the Neuroscience of Fear to End Anxiety, Panic, and Worry\" By Abilio PhD  - I recommend continued counselor for post traumatic stress     At some point, no pressure, consider following up for hearing test because if you do need hearing aids not wearing them could increase risk of dementia    Headache/migraine treatment:   Abortive medications (for immediate treatment of a headache):   It is ok to take ibuprofen, acetaminophen or naproxen (Advil, Tylenol,  Aleve, Excedrin) if they help your headaches you should limit these to No more than 3 times a week to avoid medication overuse/rebound headaches.       Over the counter preventive supplements for headaches/migraines (if you try, try for 3 months straight)  (to take every day to help prevent headaches - not to take at the time of headache):  There are combo pills online of these - none of which regulated by FDA and double check dosing - take appropriate dose only once a day- preventa migraine, migravent, mind ease, migrelief   [x] Magnesium 400mg daily (If any diarrhea or upset stomach, decrease dose  as tolerated)        Prescription preventive medications for headaches/migraines   (to take every " day to help prevent headaches - not to take at the time of headache):  [x]  I suggest trial of lower dose diamox than we typically use in more severe cases -  -     acetaZOLAMIDE (DIAMOX)  The short acting form of Diamox which you are currently on at 250 mg has peak effect from 2 to 4 hours    I am going to see if you do better on the long-acting form which I recommend taking about 10 AM and 10 PM because the long-acting form tends to last anywhere from 8 to 18 hours and most my patients find it more around the 8-hour sam therefore if you wake up at 6 AM and have symptoms of increased pressure you could then take your 250 mg as usually do at 6 AM and that could last you until 10 AM.  Then you take the long-acting pill at 10 AM and 10 PM and another short acting pill if needed at 6 PM.        -If you had papilledema or swelling behind your eyes we would rapidly get you up to 500 mg twice a day and may need to go higher.  I have 1 patient who is up to 3000 mg in a day just for reference and I will be starting you on 125 mg.      - if a lower dose is helpful, can stay lower, if higher dose causes side effects, go back to last tolerated dose.  If you get all the way up to the dose recommended and is not helping enough let me know as I will absolutely go higher.    - make sure to stay hydrated while on this as can cause dehydration since that is it's purpose to take fluid off.   - most common side effect is tingling of the nerves at times  - can cause electrolyte disturbances, but not typically in any significant way. However, be cautious if taking with other meds that lower potassium like hydrochlorothiazide etc    *Typically these types of medications take time untill you see the benefit, although some may see improvement in days, often it may take weeks, especially if the medication is being titrated up to a beneficial level. Please contact us if there are any concerns or questions regarding the medication.      Lifestyle Recommendations:  [x] SLEEP - Maintain a regular sleep schedule: Adults need at least 7-8 hours of uninterrupted a night. Maintain good sleep hygiene:  Going to bed and waking up at consistent times, avoiding excessive daytime naps, avoiding caffeinated beverages in the evening, avoid excessive stimulation in the evening and generally using bed primarily for sleeping.  One hour before bedtime would recommend turning lights down lower, decreasing your activity (may read quietly, listen to music at a low volume). When you get into bed, should eliminate all technology (no texting, emailing, playing with your phone, iPad or tablet in bed).  [x] HYDRATION - Maintain good hydration.  Drink  2L of fluid a day (4 typical small water bottles)  [x] DIET - Maintain good nutrition. In particular don't skip meals and try and eat healthy balanced meals regularly.  [x] EXERCISE - physical exercise as we all know is good for you in many ways, and not only is good for your heart, but also is beneficial for your mental health, cognitive health and  chronic pain/headaches. I would encourage at the least 5 days of physical exercise weekly for at least 30 minutes.     Education and Follow-up  [x] Please call with any questions or concerns. Of course if any new concerning symptoms go to the emergency department.  [x] Follow up 3 months, sooner if needed

## 2024-02-09 ENCOUNTER — TELEPHONE (OUTPATIENT)
Dept: NEUROLOGY | Facility: CLINIC | Age: 69
End: 2024-02-09

## 2024-02-09 NOTE — TELEPHONE ENCOUNTER
received vm from 2/7 at 3:23pm-  Yes, this is Yaya irahetabull. I'm a patient of asael, davide stevens. Uh. Somewhere I misplaced my prescription, i was in on January, 8th, 2024. And she renewed the prescription for acetazolamide.  I wanted to go to the pharmacy today to get it filled and I can't find it. if you could send that prescription to pharmacy. It's [facility] pharmacy. Uh yeah. [address] [phone]. Thank you very much. Have a nice day bye.  [phone]  -----------------------------------------  Acetazolamide script ws sent to [facility] on 1/8 electronically with 4 refills     Called pt and he states that the pharm had the prescription.  Nothing needed from our office

## 2024-05-13 ENCOUNTER — TELEPHONE (OUTPATIENT)
Dept: NEUROLOGY | Facility: CLINIC | Age: 69
End: 2024-05-13

## 2024-05-13 NOTE — TELEPHONE ENCOUNTER
Called patient and left voicemail to confirm their upcoming appointment with Dr. Moore. Informed patient about arriving in the [place] location 15 minutes prior to appointment to get checked in and go over chart.

## 2024-05-20 ENCOUNTER — TELEPHONE (OUTPATIENT)
Dept: NEUROLOGY | Facility: CLINIC | Age: 69
End: 2024-05-20

## 2024-05-20 ENCOUNTER — OFFICE VISIT (OUTPATIENT)
Dept: NEUROLOGY | Facility: CLINIC | Age: 69
End: 2024-05-20
Payer: MEDICARE

## 2024-05-20 VITALS
HEIGHT: 71 IN | DIASTOLIC BLOOD PRESSURE: 71 MMHG | SYSTOLIC BLOOD PRESSURE: 134 MMHG | WEIGHT: 206.3 LBS | TEMPERATURE: 97.9 F | HEART RATE: 69 BPM | OXYGEN SATURATION: 99 % | BODY MASS INDEX: 28.88 KG/M2

## 2024-05-20 DIAGNOSIS — G43.009 MIGRAINE WITHOUT AURA AND WITHOUT STATUS MIGRAINOSUS, NOT INTRACTABLE: ICD-10-CM

## 2024-05-20 DIAGNOSIS — G47.33 OSA (OBSTRUCTIVE SLEEP APNEA): ICD-10-CM

## 2024-05-20 DIAGNOSIS — G44.329 CHRONIC POST-TRAUMATIC HEADACHE, NOT INTRACTABLE: Primary | ICD-10-CM

## 2024-05-20 PROCEDURE — G2211 COMPLEX E/M VISIT ADD ON: HCPCS | Performed by: PSYCHIATRY & NEUROLOGY

## 2024-05-20 PROCEDURE — 99215 OFFICE O/P EST HI 40 MIN: CPT | Performed by: PSYCHIATRY & NEUROLOGY

## 2024-05-20 NOTE — TELEPHONE ENCOUNTER
Called PCP, Dr Argueta's office, spoke to William and advised of all of the below. She verbalized understanding. She will relay the message to Dr Argueta then call us back.

## 2024-05-20 NOTE — PATIENT INSTRUCTIONS
"    Please obtain the labs anytime in the near future for monitoring on this medication.  - Please make sure that your primary doctor knows that you are on acetazolamide as I agree if your blood pressure is better on acetazolamide you may not need hydrochlorothiazide especially if they are both potentially can lower your potassium      Please do follow-up with eye doctor for your regularly scheduled eye exam which I am guessing will be in September since your last one was 9/28/2023 and please discuss the episodic double vision with them and please try and obtain the note for my review or have it faxed to us if possible.    Please follow up with sleep medicine regarding the sleep apnea and the CPAP machine with Dr. Ortiz as I think you have not seen him from 11/2022  -Please either follow-up with sleep medicine for treatment of the sleep apnea or I am placing referral to the ENT/otolaryngology/ear nose and throat doctors and specifically know that Dr. Shafer with [facility] he is one of the doctors in the region who does the inspire device if this is something you are interested in as I would not give up on treating her sleep apnea  - At some point, no pressure, consider following up for hearing test because if you do need hearing aids not wearing them could increase risk of dementia    Do follow-up with your primary care doctor regarding elevated PSA  -Continue to follow with primary care provider regarding secondary stroke prevention - high blood pressure, high cholesterol     Fall precautions     - \"Rewire Your Anxious Brain: How to Use the Neuroscience of Fear to End Anxiety, Panic, and Worry\" By Abilio PhD  - I recommend continued counselor for post traumatic stress     Headache/migraine treatment:   Abortive medications (for immediate treatment of a headache):   It is ok to take ibuprofen, acetaminophen or naproxen (Advil, Tylenol,  Aleve, Excedrin) if they help your headaches you should limit these to No " more than 3 times a week to avoid medication overuse/rebound headaches.       Over the counter preventive supplements for headaches/migraines (if you try, try for 3 months straight)  (to take every day to help prevent headaches - not to take at the time of headache):  There are combo pills online of these - none of which regulated by FDA and double check dosing - take appropriate dose only once a day- preventa migraine, migravent, mind ease, migrelief   [x] Magnesium 400mg daily (If any diarrhea or upset stomach, decrease dose  as tolerated)        Prescription preventive medications for headaches/migraines   (to take every day to help prevent headaches - not to take at the time of headache):  [x]  I suggest trial of lower dose diamox than we typically use in more severe cases -  -     acetaZOLAMIDE (DIAMOX) - please take the long-acting form which I recommend taking about 10 AM and 10 PM because the long-acting form tends to last anywhere from 8 to 18 hours and most my patients find it more around the 8-hour sam therefore if you wake up at 6 AM and have symptoms of increased pressure you could then take your 250 mg as usually do at 6 AM and that could last you until 10 AM.  Then you take the long-acting pill at 10 AM and 10 PM and another short acting pill if needed at 6 PM.           - if a lower dose is helpful, can stay lower, if higher dose causes side effects, go back to last tolerated dose.  If you get all the way up to the dose recommended and is not helping enough let me know as I will absolutely go higher.    - make sure to stay hydrated while on this as can cause dehydration since that is it's purpose to take fluid off.   - most common side effect is tingling of the nerves at times  - can cause electrolyte disturbances, but not typically in any significant way. However, be cautious if taking with other meds that lower potassium like hydrochlorothiazide etc    *Typically these types of medications take  time untill you see the benefit, although some may see improvement in days, often it may take weeks, especially if the medication is being titrated up to a beneficial level. Please contact us if there are any concerns or questions regarding the medication.     Lifestyle Recommendations:  [x] SLEEP - Maintain a regular sleep schedule: Adults need at least 7-8 hours of uninterrupted a night. Maintain good sleep hygiene:  Going to bed and waking up at consistent times, avoiding excessive daytime naps, avoiding caffeinated beverages in the evening, avoid excessive stimulation in the evening and generally using bed primarily for sleeping.  One hour before bedtime would recommend turning lights down lower, decreasing your activity (may read quietly, listen to music at a low volume). When you get into bed, should eliminate all technology (no texting, emailing, playing with your phone, iPad or tablet in bed).  [x] HYDRATION - Maintain good hydration.  Drink  2L of fluid a day (4 typical small water bottles)  [x] DIET - Maintain good nutrition. In particular don't skip meals and try and eat healthy balanced meals regularly.  [x] EXERCISE - physical exercise as we all know is good for you in many ways, and not only is good for your heart, but also is beneficial for your mental health, cognitive health and  chronic pain/headaches. I would encourage at the least 5 days of physical exercise weekly for at least 30 minutes.     Education and Follow-up  [x] Please call with any questions or concerns. Of course if any new concerning symptoms go to the emergency department.  [x] Follow up 3 months, sooner if needed

## 2024-05-20 NOTE — TELEPHONE ENCOUNTER
----- Message from Tracy Moore MD sent at 5/20/2024  2:01 PM EDT -----  Could you please get a hold of his PCP office just to make sure his PCP is aware that I have him on acetazolamide and ideally we would like him to hold hydrochlorothiazide especially if potassium is dropping but are still awaiting CMP from January which she will be obtained soon if he can remember. For example could he separate losartan and HCTZ thank you

## 2024-05-20 NOTE — PROGRESS NOTES
Review of Systems   Constitutional:  Negative for appetite change, fatigue and fever.   HENT: Negative.  Negative for hearing loss, tinnitus, trouble swallowing and voice change.    Eyes:  Positive for visual disturbance (double vision). Negative for photophobia and pain.   Respiratory: Negative.  Negative for shortness of breath.    Cardiovascular: Negative.  Negative for palpitations.   Gastrointestinal: Negative.  Negative for nausea and vomiting.   Endocrine: Negative.  Negative for cold intolerance.   Genitourinary: Negative.  Negative for dysuria, frequency and urgency.   Musculoskeletal:  Positive for gait problem. Negative for back pain, myalgias, neck pain and neck stiffness.   Skin: Negative.  Negative for rash.   Allergic/Immunologic: Negative.    Neurological:  Negative for dizziness, tremors, seizures, syncope, facial asymmetry, speech difficulty, weakness, light-headedness, numbness and headaches.   Hematological: Negative.  Does not bruise/bleed easily.   Psychiatric/Behavioral: Negative.  Negative for confusion, hallucinations and sleep disturbance.    All other systems reviewed and are negative.

## 2024-05-22 NOTE — TELEPHONE ENCOUNTER
Dr Argueta's office called and stated it was ok to hold hydrochlorothiazide. Patient will go into PCP's office for a nurse visit to get BP checked first.

## 2024-05-23 NOTE — TELEPHONE ENCOUNTER
Called [phone]  and left a detailed message on pt's answering machine of all of the below (ok per consent) and  for a call back if any questions.

## 2024-05-23 NOTE — TELEPHONE ENCOUNTER
Tracy Moore MD   to [facility] Clinical Team 5       5/22/24  6:23 PM  Hopefully they are letting patient know, but just in case could you make sure he is aware of the plan

## 2024-07-01 ENCOUNTER — EVALUATION (OUTPATIENT)
Facility: CLINIC | Age: 69
End: 2024-07-01
Payer: MEDICARE

## 2024-07-01 DIAGNOSIS — R42 DIZZINESS: Primary | ICD-10-CM

## 2024-07-01 DIAGNOSIS — R26.89 IMBALANCE: ICD-10-CM

## 2024-07-01 PROCEDURE — 97161 PT EVAL LOW COMPLEX 20 MIN: CPT
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
## 2024-07-02 DIAGNOSIS — G44.329 CHRONIC POST-TRAUMATIC HEADACHE, NOT INTRACTABLE: ICD-10-CM

## 2024-07-02 RX ORDER — ACETAZOLAMIDE 500 MG/1
500 CAPSULE, EXTENDED RELEASE ORAL 2 TIMES DAILY
Qty: 180 CAPSULE | Refills: 3 | Status: SHIPPED | OUTPATIENT
Start: 2024-07-02

## 2024-07-08 ENCOUNTER — OFFICE VISIT (OUTPATIENT)
Facility: CLINIC | Age: 69
End: 2024-07-08
Payer: MEDICARE

## 2024-07-08 DIAGNOSIS — R26.89 IMBALANCE: Primary | ICD-10-CM

## 2024-07-08 DIAGNOSIS — R42 DIZZINESS: ICD-10-CM

## 2024-07-08 PROCEDURE — 97112 NEUROMUSCULAR REEDUCATION: CPT

## 2024-07-08 NOTE — PROGRESS NOTES
Daily Note     Today's date: 2024  Patient name: Yaya Rodriguez  : 1955  MRN: [id number]  Referring provider: Tracy Moore MD  Dx:   Encounter Diagnosis     ICD-10-CM    1. Imbalance  R26.89       2. Dizziness  R42                Subjective: No new updates - no falls; no episodes of dizziness. Has an appt with neuro-optometrist tomorrow       Objective: See treatment diary below      Oculomotor Screen (20 reps or 30 sec)  - Baseline Symptoms: Eye strain: 4/10  - Gaze Holding Nystagmus: Not present  - Spontaneous Nystagmus Room Light: Normal  - Smooth Pursuits (central): Excessive blinking with vertical and horizontal  - Near Point Convergence (central): WFL   - Saccades (central): Horizontal: slow to complete, corrective saccade when looking towards the right; Vertical: corrective saccade looking up  - VOR Screen (motion sensitivity): Horizontal: increased blurriness with increased speed; excessive blinking when stopped.  - VOR Cancel (central): Horizontal: remained focus, dizziness: 3-4/10; Vertical: remained focus, dizziness: 2/10   - Head Thrust (moderate to severe): + right corrective saccades towards the right   - Head Shaking Test (mild hypofunction):  no nystagmus; dizziness: 2/10      NMR:   Completed the mCTSIB and TUG - see below       Assessment: Patient tolerated PT session fairly well. Continued with testing to further determine patient's balance and vestibular dysfunction. On outcome measure, noted abnormal saccades with corrective saccade when looking towards right and up - indicating an central sign. Additionally, patient had abnormal VOR screen/cancel and + head thrust towards the right indicating an vestibular dysfunction. Patient passed all 4 conditions on the mCTSIB with moderate-severe A-P swaying on condition 4 indicating decreased vestibular input. Patient to benefit from continued skilled PT services to improve mobility and gait dysfunction to reduce fall risk.     Plan:  Complete FGA/DGI  next session. Gaze stabilization/habituation/balance          Outcome Measures Initial Eval  7/1/2024 7/8/24       5xSTS 7.38 sec without UE support        TUG  - Regular  - Cognitive   Defer    - 8.38 sec  - 9.95 (subtracting 3 from 100, multiple errors)       10 meter Defer        FGA Defer         DGI Defer         mCTSIB  - FTEO (firm)  - FTEC (firm)  - FTEO (foam)  - FTEC (foam)   Defer    - 30 sec  - 30 sec (+)  - 30 secs  - 30 sec (+)

## 2024-07-10 ENCOUNTER — OFFICE VISIT (OUTPATIENT)
Facility: CLINIC | Age: 69
End: 2024-07-10
Payer: MEDICARE

## 2024-07-10 DIAGNOSIS — R42 DIZZINESS: ICD-10-CM

## 2024-07-10 DIAGNOSIS — R26.89 IMBALANCE: Primary | ICD-10-CM

## 2024-07-10 PROCEDURE — 97530 THERAPEUTIC ACTIVITIES: CPT

## 2024-07-10 NOTE — PROGRESS NOTES
Daily Note     Today's date: 7/10/2024  Patient name: Yaya Rodriguez  : 1955  MRN: [id number]  Referring provider: Tracy Moore MD  Dx:   Encounter Diagnosis     ICD-10-CM    1. Imbalance  R26.89       2. Dizziness  R42                Subjective: No new updates - no falls; no episodes of dizziness. Had an appt with neuro-optometrist; reported divergence deficits. Has OT vision next week      Objective: See treatment diary below      Cervical Spine AROM  - Flexion: WFL No pain  - Extension: WFL No pain  - R Rotation: Moderate limitation No pain  - L Rotation: Moderate limitation No pain  - R Lateral Flexion: Minimal limitation No pain  - L Lateral Flexion: Moderate limitation No pain    Palpation  - Hypertonic Muscles: R Upper Trapezius, L Upper Trapezius, R Anterior Scalenes, L Anterior Scalenes, R Levator Scapulae, and L Levator Scapulae      Joint Play  - Hypermobile: N/A  - Hypomobile: C3 and C4    Cervical JPE:   - R rotation: Abnormal (> 4.5 degrees, 3/3 trials)  - L rotation:: Normal  - Upward: Normal  - downward: Normal    NMR:   Neck proprioception with laser: maze, star, letter/number matching  FGA - see below      TE:  Exercises  - Seated Cervical Sidebending Stretch  - 1 x daily - 7 x weekly - 3 sets - 30 hold  - Seated Levator Scapulae Stretch  - 1 x daily - 7 x weekly - 3 sets - 30 hold      Assessment: Patient tolerated PT session fairly well. Continued with testing to further determine if cervical dysfunction is leading to sensations of dizziness. Pt presented with normal AROM for cervical flexion and extension. Mild to moderate limitations with L/R rotation and L/R side bending. He also demonstrated impaired cervical proprioception with R rotation only.  Normal cervical proprioception observed with up/down/L rotation JPE. FGA revealed patient is at LOW fall risk. Patient to benefit from continued skilled PT services to improve mobility and gait dysfunction.    Plan: Complete FGA/DGI   next session. Gaze stabilization/habituation/balance          Outcome Measures Initial Eval  7/1/2024        5xSTS 7.38 sec without UE support        TUG  - Regular  - Cognitive    8.38 sec  - 9.95 (subtracting 3 from 100, multiple errors)    - 8.38 sec  - 9.95 (subtracting 3 from 100, multiple errors)       10 meter 1.14         [facility] 23/30        DGI Defer         mCTSIB  - FTEO (firm)  - FTEC (firm)  - FTEO (foam)  - FTEC (foam) - 30 sec  - 30 sec (+)  - 30 secs  - 30 sec (+)

## 2024-07-16 ENCOUNTER — OFFICE VISIT (OUTPATIENT)
Facility: CLINIC | Age: 69
End: 2024-07-16
Payer: MEDICARE

## 2024-07-16 ENCOUNTER — EVALUATION (OUTPATIENT)
Facility: CLINIC | Age: 69
End: 2024-07-16
Payer: MEDICARE

## 2024-07-16 DIAGNOSIS — R26.89 IMBALANCE: Primary | ICD-10-CM

## 2024-07-16 DIAGNOSIS — R41.9 COGNITIVE COMPLAINTS: Primary | ICD-10-CM

## 2024-07-16 DIAGNOSIS — H53.9 VISION CHANGES: ICD-10-CM

## 2024-07-16 DIAGNOSIS — R42 DIZZINESS: ICD-10-CM

## 2024-07-16 PROCEDURE — 97166 OT EVAL MOD COMPLEX 45 MIN: CPT | Performed by: OCCUPATIONAL THERAPIST

## 2024-07-16 PROCEDURE — 97112 NEUROMUSCULAR REEDUCATION: CPT

## 2024-07-16 NOTE — PROGRESS NOTES
"Daily Note     Today's date: 2024  Patient name: Yaya Rodriguez  : 1955  MRN: [id number]  Referring provider: Tracy Moore MD  Dx:   Encounter Diagnosis     ICD-10-CM    1. Imbalance  R26.89       2. Dizziness  R42                Subjective: No new updates - no falls; no episodes of dizziness.     Objective: See treatment diary below      Standing VOR x 1, self selected speed 1 minute each direction x 3 sets   Horizontal: 0/10 D   Vertical 0/10 D  Forward walking + backward walking, VOR x 1 target on wall (10 feet forward/backward) x 10 cycles   Horizontal: 4/10 D   Vertical: 2/10 D    Standing on foam with laser - spelling out A-Z x 5 minutes    0/10 dizziness  Forward and backward stepping over 6\" hurdles while tracing maze with cervical head laser x 5 minutes    0/10 dizziness    TE:  Exercises  - Seated Cervical Sidebending Stretch  - 1 x daily - 7 x weekly - 3 sets - 30 hold  - Seated Levator Scapulae Stretch  - 1 x daily - 7 x weekly - 3 sets - 30 hold      Assessment: Patient tolerated PT session fairly well. Asymptomatic with static VOR (Horizontal and vertical) at self-selected speed. Pt only symptomatic with dynamic VOR exercise while walking forward/backwrad walking (Horizontal > Dizziness). Patient reported increased concentration with dual tasking; plan to continue challenging patient with both cognition and motor load during balance activities. No overt LOB today. Plan to continue with balance activities with gaze stabilization, dual tasking, and habituation.  Plan:  Continue with PLOC - progress as appropriate. Gaze stabilization/habituation/balance with cognitive/motor dual tasking.          Outcome Measures Initial Eval  2024        5xSTS 7.38 sec without UE support        TUG  - Regular  - Cognitive    8.38 sec  - 9.95 (subtracting 3 from 100, multiple errors)           10 meter 1.14         FGA         DGI Defer         mCTSIB  - FTEO (firm)  - FTEC (firm)  - FTEO " (foam)  - FTEC (foam) - 30 sec  - 30 sec (+)  - 30 secs  - 30 sec (+)
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
## 2024-07-17 ENCOUNTER — OFFICE VISIT (OUTPATIENT)
Facility: CLINIC | Age: 69
End: 2024-07-17
Payer: MEDICARE

## 2024-07-17 DIAGNOSIS — R26.89 IMBALANCE: Primary | ICD-10-CM

## 2024-07-17 DIAGNOSIS — H53.9 VISION CHANGES: ICD-10-CM

## 2024-07-17 DIAGNOSIS — R41.9 COGNITIVE COMPLAINTS: Primary | ICD-10-CM

## 2024-07-17 DIAGNOSIS — R42 DIZZINESS: ICD-10-CM

## 2024-07-17 PROCEDURE — 97530 THERAPEUTIC ACTIVITIES: CPT

## 2024-07-17 PROCEDURE — 97112 NEUROMUSCULAR REEDUCATION: CPT

## 2024-07-17 PROCEDURE — 96125 COGNITIVE TEST BY HC PRO: CPT

## 2024-07-17 NOTE — PROGRESS NOTES
Daily Note     Today's date: 2024  Patient name: Yaya Rodriguze  : 1955  MRN: [id number]  Referring provider: Tracy Moore MD  Dx:   Encounter Diagnoses   Name Primary?    Cognitive complaints Yes    Vision changes        Start Time: 154  Stop Time: 1630  Total time in clinic (min): 45 minutes    PLAN OF CARE START: 2024  PLAN OF CARE END: 10/16/2024  FREQUENCY: 2-3x per week for 8-12 weeks  PRECAUTIONS balance deficits     Subjective: Pt states he saw Dr. Hook, stated his R eye was worse, convergence has improved.       Objective: See treatment below.    CISS Score: 50/60 indicating convergence insufficiency                                                      VISION SCREEN             glasses/contacts/none       Comments/symptom exacerbation:           Symptoms include Wears glasses      Last eye exam  About a week ago             Visual Acuity (near)  L: 20/100  R: 20/70     Binocularity (near)  OD: hypophoria OS: exophoria  Orthotropia bilaterally      Binocularity (far)  OS: Hyperphoria  OD: Hypophoria      Near point convergence       Frederic string        Red/green fusion light       Pursuits       Saccades       Range of motion       Visual perceptual midline test       Visual field testing        Assessments  The Repeatable Battery for the Assessment of Neuropsychological Status (RBANS) is a brief, individually-administered assessment which measures attention, language, visuospatial/constructional abilities, and immediate & delayed memory. The RBANS is intended for use with adolescents to adults, ages 12 to 89 years. The following results were obtained during the administration of the assessment.    Form: A    Cognitive Domain/Subtest: Index Score: Percentile Rank: Classification: IE: Status:   IMMEDIATE MEMORY 106  Average 106         1. List Learning ()          2. Story Memory ()           VISUOSPATIAL/  CONSTRUCTIONAL 84  Low Average 84         3. Figure Copy ()           4. Line Orientation (15/20)           LANGUAGE 92  Average 92         5. Picture Naming (10/10)          6. Semantic Fluency (15/40)           ATTENTION 91  Average 91         7. Digit Span (10/16)          8. Coding (30/89)           DELAYED MEMORY 78  Borderline 78         9. List Recall (2/10)          10. List Recognition (18/20)          11. Story Recall (2/12)          12. Figure Recall (12/20)           Form: A      Date: 7/17/24      Sum of Index Scores:  451      Total Score:  86      Percentile: 18%ile      Classification: Low Average          “IE” indicates the scores from the initial evaluation (7/17/24). Form: A    TIME SPENT  70 min for administration, documentation, interpretation, scoring and POC development using RBANS     Assessment: Tolerated treatment well. Pt demonstrating in session impaired delayed recall, OM teaming/fusion, convergence insufficiency. Pt would benefit from continued OT services to address these deficits      Plan: Continued skilled OT per POC. PLEASE COMPLETE VISION SCREEN NEXT SESSION    SHORT TERM GOALS    Pt will demo G recall of 75% of verbal/written information utilizing memory strategy of choice for improved delayed memory   Pt will increase delayed recall being able to recall 3 items on RBANs in LTM   Pt will increase oculomotor control for improved saccades, con/divergent tasks for improved reading, board to table tasks with minimal increase in symptoms 4 weeks    LONG TERM GOALS:     Memory     Pt will demo G recall of 85% of verbal/written information utilizing memory strategy of choice for improved delayed memory   Pt will increase delayed recall being able to recall 5 items on RBANs in LTM   Pt will improve overall score on RBANs to within average ranges.   Pt will increase oculomotor control for improved saccades, con/divergent tasks for improved reading, board to table tasks with no increase in symptoms in 12 weeks

## 2024-07-17 NOTE — PROGRESS NOTES
Daily Note     Today's date: 2024  Patient name: Yaya Rodriguez  : 1955  MRN: [id number]  Referring provider: Tracy Moore MD  Dx:   Encounter Diagnosis     ICD-10-CM    1. Imbalance  R26.89       2. Dizziness  R42           Subjective: No new updates - no falls; no episodes of dizziness.     Objective: See treatment diary below    Standing VOR x 1, self selected speed 1 minute each direction x 3 sets   Horizontal: 0/10 D   Vertical 0/10 D  Forward walking + backward walking, VOR x 1 target on wall (5, then 10 feet forward/backward) x 1 min   Horizontal: 2-3/10 D   Vertical: 2/10 D  Figure 8: 2 x 1 min   3/10 D  Over the rainbow with ball: 2 x 1 min   3/10 D    TE:  Exercises  - Seated Cervical Sidebending Stretch  - 1 x daily - 7 x weekly - 3 sets - 30 hold  - Seated Levator Scapulae Stretch  - 1 x daily - 7 x weekly - 3 sets - 30 hold      Assessment: Patient tolerated PT session fairly well. Patient with improvement in dizziness symptoms with walking VOR as compared to previous session. Did illustrate short interruptions in activity secondary to difficulty focusing his eyes/dizziness, however this did improve with repeated reps. Incorporated 120 beats/min with good tolerance and mild inc in DZ symptoms. Plan to continue with balance activities with gaze stabilization, dual tasking, and habituation.    Plan:  Continue with PLOC - progress as appropriate. Gaze stabilization/habituation/balance with cognitive/motor dual tasking.          Outcome Measures Initial Eval  2024        5xSTS 7.38 sec without UE support        TUG  - Regular  - Cognitive    8.38 sec  - 9.95 (subtracting 3 from 100, multiple errors)           10 meter 1.14         FGA         DGI Defer         mCTSIB  - FTEO (firm)  - FTEC (firm)  - FTEO (foam)  - FTEC (foam) - 30 sec  - 30 sec (+)  - 30 secs  - 30 sec (+)

## 2024-07-23 ENCOUNTER — OFFICE VISIT (OUTPATIENT)
Facility: CLINIC | Age: 69
End: 2024-07-23
Payer: MEDICARE

## 2024-07-23 DIAGNOSIS — H53.9 VISION CHANGES: ICD-10-CM

## 2024-07-23 DIAGNOSIS — R41.9 COGNITIVE COMPLAINTS: Primary | ICD-10-CM

## 2024-07-23 DIAGNOSIS — R42 DIZZINESS: ICD-10-CM

## 2024-07-23 DIAGNOSIS — R26.89 IMBALANCE: Primary | ICD-10-CM

## 2024-07-23 PROCEDURE — 97112 NEUROMUSCULAR REEDUCATION: CPT | Performed by: PHYSICAL THERAPIST

## 2024-07-23 NOTE — PROGRESS NOTES
Daily Note     Today's date: 2024  Patient name: Yaya Rodriguez  : 1955  MRN: [id number]  Referring provider: Tracy Moore MD  Dx:   Encounter Diagnosis     ICD-10-CM    1. Imbalance  R26.89       2. Dizziness  R42             Subjective: Pt reports to PT with no new complaints or episodes of dizziness.     Objective: See treatment diary below    Standing VOR x 1, metronome at 100 bpm speed 1.5 minute each direction x 1 sets   Horizontal: 0/10 D   Vertical 2/10 D  FWD/BWD walking, VOR x 1 target on wall with 100 bpm metronome- 1 min x 2   Horizontal: 2/10 D   Vertical: 2/10 D  Side stepping on foam beam with VOR x1 using popsicle stick with small letter   Horizontal: 2/10 D   Vertical: 2/10 D  Over the rainbow with physioball toss on ground: 12 reps bilaterally   3/10 D    TE:  Exercises  - Upper trap stretch- 30 sec hold x 2 bilaterally        Assessment: Patient tolerated PT session fairly well. Pt continues to demonstrate short interruptions in activity throughout sessions secondary to difficulty focusing his eyes/dizziness checking for smudges on his glasses. Pt continues to tolerate VOR x 1 with metronome with minimal increased reports of dizziness throughout. With side stepping on foam, pt demonstrated increased multidirectional postural sway, requiring a stepping strategy off the foam beam to maintain balance.  Plan to continue with balance activities with gaze stabilization, dual tasking, and habituation.    Plan:  Continue with PLOC - progress as appropriate. Gaze stabilization/habituation/balance with cognitive/motor dual tasking.          Outcome Measures Initial Eval  2024        5xSTS 7.38 sec without UE support        TUG  - Regular  - Cognitive    8.38 sec  - 9.95 (subtracting 3 from 100, multiple errors)           10 meter 1.14         FGA         DGI Defer         mCTSIB  - FTEO (firm)  - FTEC (firm)  - FTEO (foam)  - FTEC (foam) - 30 sec  - 30 sec (+)  - 30 secs  - 30  sec (+)

## 2024-07-23 NOTE — PROGRESS NOTES
Daily Note     Today's date: 2024  Patient name: Yaya Rodriguez  : 1955  MRN: [id number]  Referring provider: Tracy Moore MD  Dx:   Encounter Diagnoses   Name Primary?    Cognitive complaints Yes    Vision changes                   PLAN OF CARE START: 2024  PLAN OF CARE END: 10/16/2024  FREQUENCY: 2-3x per week for 8-12 weeks  PRECAUTIONS balance deficits     Subjective: Pt states he saw Dr. Hook, stated his R eye was worse, convergence has improved.       Objective: See treatment below.                                           VISION SCREEN             glasses/contacts/none       Comments/symptom exacerbation:           Symptoms include Wears glasses      Last eye exam  About a week ago             Visual Acuity (near)  L: 20/100  R: 20/70     Binocularity (near)  OD: hypophoria OS: exophoria  Orthotropia bilaterally      Binocularity (far) OS: Hyperphoria  OD: Hypophoria      Near point convergence       Frederic string        Red/green fusion light       Pursuits       Saccades       Range of motion       Visual perceptual midline test       Visual field testing            Assessment: Tolerated treatment well. Pt demonstrating in session impaired delayed recall, OM teaming/fusion, convergence insufficiency. Pt would benefit from continued OT services to address these deficits      Plan: Continued skilled OT per POC. PLEASE COMPLETE VISION SCREEN NEXT SESSION    SHORT TERM GOALS    Pt will demo G recall of 75% of verbal/written information utilizing memory strategy of choice for improved delayed memory   Pt will increase delayed recall being able to recall 3 items on RBANs in LTM   Pt will increase oculomotor control for improved saccades, con/divergent tasks for improved reading, board to table tasks with minimal increase in symptoms 4 weeks    LONG TERM GOALS:     Memory     Pt will demo G recall of 85% of verbal/written information utilizing memory strategy of choice for improved  delayed memory   Pt will increase delayed recall being able to recall 5 items on RBANs in LTM   Pt will improve overall score on RBANs to within average ranges.   Pt will increase oculomotor control for improved saccades, con/divergent tasks for improved reading, board to table tasks with no increase in symptoms in 12 weeks

## 2024-07-24 ENCOUNTER — OFFICE VISIT (OUTPATIENT)
Facility: CLINIC | Age: 69
End: 2024-07-24
Payer: MEDICARE

## 2024-07-24 DIAGNOSIS — R42 DIZZINESS: ICD-10-CM

## 2024-07-24 DIAGNOSIS — R26.89 IMBALANCE: Primary | ICD-10-CM

## 2024-07-24 DIAGNOSIS — R41.9 COGNITIVE COMPLAINTS: Primary | ICD-10-CM

## 2024-07-24 DIAGNOSIS — H53.9 VISION CHANGES: ICD-10-CM

## 2024-07-24 PROCEDURE — 97112 NEUROMUSCULAR REEDUCATION: CPT

## 2024-07-24 NOTE — PROGRESS NOTES
"Daily Note     Today's date: 2024  Patient name: Yaya Rodriguez  : 1955  MRN: [id number]  Referring provider: Tracy Moore MD  Dx:   Encounter Diagnosis     ICD-10-CM    1. Imbalance  R26.89       2. Dizziness  R42             Subjective: Pt reports to PT with no complaints. Reports \"feeling off\" which he always feel.     Objective: See treatment diary below    Standing VOR x 1, metronome at 100 bpm speed 60 minute each direction x 3 sets   Horizontal: 0/10 D   Vertical 2/10 D  Forward walking with self ball toss, 100 feet x 2    Off balance: 2-3/10  Step up with 6\" with 5.5 TT   Static standing on bosu with trunk rotation to pass 6# MB  Biodex \"play catch\" medium difficulty x2 minutes (2 sets)  Biodex \"Maze control training\"- 2 minutes     TE:  Exercises  - Upper trap stretch- 30 sec hold x 2 bilaterally        Assessment: Patient tolerated PT session fairly well.  No dizziness with static VOR x 1. Pt did not he \"felt better\" and didn't \"mess up\" as much when he didn't have his car on compared to glasses off. Pt has also reported at home he has been taking his glasses off more. Worked on higher level balance with head movements. Only increase in symptoms with ball toss when looking down. No overt LOB with standing on BOSU. Incorporated biodex to work on multi-directional weight shifting; no increase in symptoms. Most challenged with achieving anterior weight shift on the biodex. Plan to continue with balance activities with gaze stabilization, dual tasking, and habituation.    Plan:  Continue with PLOC - progress as appropriate. Gaze stabilization/habituation/balance with cognitive/motor dual tasking.          Outcome Measures Initial Eval  2024        5xSTS 7.38 sec without UE support        TUG  - Regular  - Cognitive    8.38 sec  - 9.95 (subtracting 3 from 100, multiple errors)           10 meter 1.14         FGA         DGI Defer         mCTSIB  - FTEO (firm)  - FTEC (firm)  - FTEO " (foam)  - FTEC (foam) - 30 sec  - 30 sec (+)  - 30 secs  - 30 sec (+)

## 2024-07-24 NOTE — PROGRESS NOTES
"Daily Note     Today's date: 2024  Patient name: Yaya Rodriguez  : 1955  MRN: [id number]  Referring provider: Tracy Moore MD  Dx:   Encounter Diagnoses   Name Primary?    Cognitive complaints Yes    Vision changes                     PLAN OF CARE START: 2024  PLAN OF CARE END: 10/16/2024  FREQUENCY: 2-3x per week for 8-12 weeks  PRECAUTIONS balance deficits     Subjective: Pt missed his appt earlier but reports he isn't sure why because he got a reminder text.    Objective: See treatment below.                                                     VISION SCREEN             glasses/contacts/none       Comments/symptom exacerbation:           Near point convergence Blurriness at 8\" and recovers at 10.5\"      Frederic string   Mild misalignment OD  X, no suppression     Pursuits Very mildly jerky in all directions.  Excessive blinking but pt denies noting symptom exacerbation      Saccades Undershooting in all directions.  OD mildly dysmetric      Range of motion WNL      Visual perceptual midline test WNL      Visual field testing WNL       -Spot it activity performed with cards presented 3 pairs at a time in positions to facilitate saccades in each direction.  Additional focus on working memory, .  Pt reports mild inc in symptoms to 2-3/10 (initially 0-1/10)    -Wooden intelligence mirror image activity performed with design provided on windowsill anteriorly and pt completing serial calculations based on the color of piece inserted.  Focus on mental manipulation, vertical saccades, visual accomodation, convergence/divergence, , divided attn, working memory.    Assessment: Tolerated treatment well. Pt demonstrating in session impaired delayed recall, OM teaming/fusion, convergence insufficiency. Pt would benefit from continued OT services to address these deficits      Plan: Continued skilled OT per POC.    SHORT TERM GOALS    Pt will demo G recall of 75% of verbal/written information " utilizing memory strategy of choice for improved delayed memory   Pt will increase delayed recall being able to recall 3 items on RBANs in LTM   Pt will increase oculomotor control for improved saccades, con/divergent tasks for improved reading, board to table tasks with minimal increase in symptoms 4 weeks    LONG TERM GOALS:     Memory     Pt will demo G recall of 85% of verbal/written information utilizing memory strategy of choice for improved delayed memory   Pt will increase delayed recall being able to recall 5 items on RBANs in LTM   Pt will improve overall score on RBANs to within average ranges.   Pt will increase oculomotor control for improved saccades, con/divergent tasks for improved reading, board to table tasks with no increase in symptoms in 12 weeks

## 2024-07-30 ENCOUNTER — OFFICE VISIT (OUTPATIENT)
Facility: CLINIC | Age: 69
End: 2024-07-30
Payer: MEDICARE

## 2024-07-30 DIAGNOSIS — H53.9 VISION CHANGES: Primary | ICD-10-CM

## 2024-07-30 DIAGNOSIS — R42 DIZZINESS: ICD-10-CM

## 2024-07-30 DIAGNOSIS — R26.89 IMBALANCE: Primary | ICD-10-CM

## 2024-07-30 DIAGNOSIS — R41.9 COGNITIVE COMPLAINTS: ICD-10-CM

## 2024-07-30 PROCEDURE — 97112 NEUROMUSCULAR REEDUCATION: CPT

## 2024-07-30 PROCEDURE — 97112 NEUROMUSCULAR REEDUCATION: CPT | Performed by: OCCUPATIONAL THERAPIST

## 2024-07-30 NOTE — PROGRESS NOTES
"Daily Note     Today's date: 2024  Patient name: Yaya Rodriguez  : 1955  MRN: [id number]  Referring provider: Tracy Moore MD  Dx:   Encounter Diagnosis     ICD-10-CM    1. Imbalance  R26.89       2. Dizziness  R42             Subjective: Pt reports to PT with no complaints. Reports \"feeling off\" which he always feel.     Objective: See treatment diary below    Standing VOR x 1, airex, 1 minute x 3 each direction   Horizontal: 0/10 D; sensation of off-balance   Vertical 0/10 D; sensation of off-balance  Forward step up on airex with 10# TT x 2 minutes   Lateral step up on airex with 10# TT x 2 minues each direction   Rotational turns on airex with UE and LE blaze pod taps (2 minutes x 2 sets)   - Reported \"Everything is moving afterwards)  Habituation exercise with picking cones off the ground    - Picked up 5 cones without a rest break    - \"off balance\" increased to 4-5/10, with A-P swaying with increased time.     TE:  Exercises  - Upper trap stretch- 30 sec hold x 2 bilaterally        Assessment: Patient tolerated PT session fairly well. Patient has an difficult time explaining what symptoms he is feeling with exercise, Often, patient is asked if he feels dizziness, he states \"Whatever, I don't know\". Pt has hard time distinguishing between off-balance versus dizziness. Pt displayed anterior-posterior swaying when standing up bending down with several minor LOB which he recovered independently. Intensity of A-P swaying increased with repetition. Plan to complete habituation with bending down.  Plan to continue with balance activities with gaze stabilization, dual tasking, and habituation.    Plan:  Continue with PLOC - progress as appropriate. Gaze stabilization/habituation/balance with cognitive/motor dual tasking.          Outcome Measures Initial Eval  2024        5xSTS 7.38 sec without UE support        TUG  - Regular  - Cognitive    8.38 sec  - 9.95 (subtracting 3 from 100, multiple " "SUBJECTIVE:   Keith Kevin is a 67 year old male who presents for Preventive Visit.    Are you in the first 12 months of your Medicare coverage?  No-[place] effective from 1/1/18    Annual Wellness Visit     In general, how would you rate your overall health?  Excellent    Frequency of exercise:  6-7 days/week    Duration of exercise:  Greater than 60 minutes    Do you usually eat at least 4 servings of fruit and vegetables a day, include whole grains    & fiber and avoid regularly eating high fat or \"junk\" foods?  Yes    Taking medications regularly:  Yes    Medication side effects:  Not applicable    Ability to successfully perform activities of daily living:  No assistance needed    Home Safety:  Lack of grab bars in the bathroom    Hearing Impairment:  Difficulty following a conversation in a noisy restaurant or crowded room, feel that people are mumbling or not speaking clearly, difficulty following dialogue in the theater, difficult to understand a speaker at a public meeting or Buddhist service, need to ask people to speak up or repeat themselves, find that men's voices are easier to understand than woman's, difficulty understanding soft or whispered speech and difficulty understanding speech on the telephone    In the past 6 months, have you been bothered by leaking of urine?  No    In general, how would you rate your overall mental or emotional health?  Excellent    PHQ-2 Total Score: 0    Additional concerns today:  No    Do you feel safe in your environment? Yes    Do you have a Health Care Directive? Yes: Advance Directive has been received and scanned.    Fall risk  Fallen 2 or more times in the past year?: No  Any fall with injury in the past year?: No    Cognitive Screening   1) Repeat 3 items (Leader, Season, Table)    2) Clock draw: NORMAL  3) 3 item recall: Recalls 3 objects  Results: 3 items recalled: COGNITIVE IMPAIRMENT LESS LIKELY    Mini-CogTM Copyright FAUSTINO Read. Licensed by the " author for use in [employer]; reprinted with permission (kelton@[facility]). All rights reserved.      Do you have sleep apnea, excessive snoring or daytime drowsiness?: no      Reviewed and updated as needed this visit by Provider  Tobacco  Allergies  Meds  Problems  Med Hx  Surg Hx  Fam Hx          Social History     Tobacco Use     Smoking status: Never Smoker     Smokeless tobacco: Never Used   Substance Use Topics     Alcohol use: Yes     Alcohol/week: 0.0 oz     Comment: 1-2 beers per week       Alcohol Use 2/12/2019   If you drink alcohol do you typically have greater than 3 drinks per day OR greater than 7 drinks per week? No       Current providers sharing in care for this patient include:   Patient Care Team:  Abram Villanueva MD as PCP - General  Abram Villanueva MD as PCP - Assigned PCP    The following health maintenance items are reviewed in Epic and correct as of today:  Health Maintenance   Topic Date Due     ZOSTER IMMUNIZATION (1 of 2) 07/21/1999     FALL RISK ASSESSMENT  02/07/2019     PHQ-2 Q1 YR  02/07/2019     COLONOSCOPY Q10 YR  04/18/2021     ADVANCE DIRECTIVE PLANNING Q5 YRS  06/14/2022     LIPID SCREENING Q5 YEARS  02/07/2023     DTAP/TDAP/TD IMMUNIZATION (3 - Td) 02/07/2028     INFLUENZA VACCINE  Completed     PNEUMOCOCCAL IMMUNIZATION 65+ LOW/MEDIUM RISK  Completed     AORTIC ANEURYSM SCREENING (SYSTEM ASSIGNED)  Completed     HEPATITIS C SCREENING  Completed     IPV IMMUNIZATION  Aged Out     MENINGITIS IMMUNIZATION  Aged Out     Labs reviewed in EPIC    Review of Systems   Constitutional: Negative for chills and fever.   HENT: Positive for hearing loss. Negative for congestion, ear pain and sore throat.    Eyes: Negative for pain and visual disturbance.   Respiratory: Negative for cough and shortness of breath.    Cardiovascular: Negative for chest pain, palpitations and peripheral edema.   Gastrointestinal: Negative for abdominal pain, constipation, diarrhea, heartburn,  errors)           10 meter 1.14         FGA 23/30        DGI Defer         mCTSIB  - FTEO (firm)  - FTEC (firm)  - FTEO (foam)  - FTEC (foam) - 30 sec  - 30 sec (+)  - 30 secs  - 30 sec (+)                                              "hematochezia and nausea.   Genitourinary: Negative for discharge, frequency, genital sores, hematuria, impotence and urgency.   Musculoskeletal: Negative for arthralgias, joint swelling and myalgias.   Skin: Negative for rash.   Neurological: Negative for dizziness, weakness, headaches and paresthesias.   Psychiatric/Behavioral: Negative for mood changes. The patient is not nervous/anxious.        OBJECTIVE:   /54 (BP Location: Right arm, Patient Position: Sitting, Cuff Size: Adult Regular)   Pulse 60   Temp 97.3  F (36.3  C) (Oral)   Resp 16   Ht 1.798 m (5' 10.77\")   Wt 81.6 kg (180 lb)   SpO2 97%   BMI 25.27 kg/m   Estimated body mass index is 25.27 kg/m  as calculated from the following:    Height as of this encounter: 1.798 m (5' 10.77\").    Weight as of this encounter: 81.6 kg (180 lb).  Physical Exam  GENERAL: healthy, alert and no distress  EYES: Eyes grossly normal to inspection, PERRL and conjunctivae and sclerae normal  HENT: ear canals and TM's normal, nose and mouth without ulcers or lesions  NECK: no adenopathy, no asymmetry, masses, or scars and thyroid normal to palpation  RESP: lungs clear to auscultation - no rales, rhonchi or wheezes  CV: regular rate and rhythm, normal S1 S2, no S3 or S4, no murmur, click or rub, no peripheral edema and peripheral pulses strong  ABDOMEN: soft, nontender, no hepatosplenomegaly, no masses and bowel sounds normal  MS: no gross musculoskeletal defects noted, no edema  SKIN: no suspicious lesions or rashes  NEURO: Normal strength and tone, mentation intact and speech normal  PSYCH: mentation appears normal, affect normal/bright      ASSESSMENT / PLAN:       ICD-10-CM    1. Routine general medical examination at a health care facility Z00.00 Glucose     Lipid Profile   2. Acute reaction to stress F43.0 propranolol (INDERAL) 10 MG tablet    uses Propranolol prn   3. Screening for prostate cancer Z12.5 Prostate spec antigen screen       End of Life " "Planning:  Patient currently has an advanced directive: Yes.  Practitioner is supportive of decision.    COUNSELING:  Reviewed preventive health counseling, as reflected in patient instructions    BP Readings from Last 1 Encounters:   02/12/19 112/54     Estimated body mass index is 25.27 kg/m  as calculated from the following:    Height as of this encounter: 1.798 m (5' 10.77\").    Weight as of this encounter: 81.6 kg (180 lb).           reports that  has never smoked. he has never used smokeless tobacco.      Appropriate preventive services were discussed with this patient, including applicable screening as appropriate for cardiovascular disease, diabetes, osteopenia/osteoporosis, and glaucoma.  As appropriate for age/gender, discussed screening for colorectal cancer, prostate cancer. Checklist reviewing preventive services available has been given to the patient.    Reviewed patients plan of care and provided an AVS. The Basic Care Plan (routine screening as documented in Health Maintenance) for Keith meets the Care Plan requirement. This Care Plan has been established and reviewed with the Patient.    Counseling Resources:  ATP IV Guidelines  Pooled Cohorts Equation Calculator  Breast Cancer Risk Calculator  FRAX Risk Assessment  ICSI Preventive Guidelines  Dietary Guidelines for Americans, 2010  [employer] MyPlate  ASA Prophylaxis  Lung CA Screening    Abram Villanueva MD  [facility]  "

## 2024-07-30 NOTE — PROGRESS NOTES
Daily Note     Today's date: 2024  Patient name: Yaya Rodriguez  : 1955  MRN: [id number]  Referring provider: Tracy Moore MD  Dx:   Encounter Diagnoses   Name Primary?    Vision changes Yes    Cognitive complaints            Start Time: 1630  Stop Time: 1715  Total time in clinic (min): 45 minutes    PLAN OF CARE START: 2024  PLAN OF CARE END: 10/16/2024  FREQUENCY: 2-3x per week for 8-12 weeks  PRECAUTIONS balance deficits     Subjective: Pt did not have anything new to report since last session.     Objective: See treatment below.                NMR:  -straw and toothpick activty performed to address convergence and hand eye coordination. Upgrading exercise to perform while moving straw vertically to challenge hand-eye coordination.     -peripheral vision integration activity while maintaining central fixation completed in short hallway. Pt instructed to maintain stance and indicate position of 3-4 letters/shapes in specific sequence. Upgrading activity by performing on foam beam for ~2 minutes. Significant postural sway noted initially on foam beam, however no LOB. Then performing walking forward and backwards. Activity completed to address peripheral vision integration, standing balance, immediate recall, and attention. Maintained high guard position throughout forward/backward ambulation, no LOB. CGA for safety throughout.     -double spot activity with cognitive component of stating a word for each coordinating letter to address saccades, recall, working memory,  skills. Performing activity in checkerboard cubby to address peripheral vision integration and convergence. Intermittently needing min category cues to provide a word for corresponding letter. Performing activity in stance while on 2 inch foam beam to address standing balance. Pt instructed to turn posteriorly to retreive colored coins to incorporate dynamic standing balance. CGA for safety.       -spot the difference  completed in checkerboard cubby to address peripheral vision integration, saccades and attention. Pt required min cues towards end of activity due to fatigue.       Assessment: Tolerated treatment well. Pt reported he felt okay throughout all activities today, although appeared unsteady and difficulty maintaining balance on foam surfaces. Pt demonstrating in session impaired delayed recall, OM teaming/fusion, convergence insufficiency. Pt would benefit from continued OT services to address these deficits      Plan: Continued skilled OT per POC.    SHORT TERM GOALS    Pt will demo G recall of 75% of verbal/written information utilizing memory strategy of choice for improved delayed memory   Pt will increase delayed recall being able to recall 3 items on RBANs in LTM   Pt will increase oculomotor control for improved saccades, con/divergent tasks for improved reading, board to table tasks with minimal increase in symptoms 4 weeks    LONG TERM GOALS:     Memory     Pt will demo G recall of 85% of verbal/written information utilizing memory strategy of choice for improved delayed memory   Pt will increase delayed recall being able to recall 5 items on RBANs in LTM   Pt will improve overall score on RBANs to within average ranges.   Pt will increase oculomotor control for improved saccades, con/divergent tasks for improved reading, board to table tasks with no increase in symptoms in 12 weeks

## 2024-07-31 ENCOUNTER — OFFICE VISIT (OUTPATIENT)
Facility: CLINIC | Age: 69
End: 2024-07-31
Payer: MEDICARE

## 2024-07-31 DIAGNOSIS — R42 DIZZINESS: ICD-10-CM

## 2024-07-31 DIAGNOSIS — R26.89 IMBALANCE: Primary | ICD-10-CM

## 2024-07-31 PROCEDURE — 97112 NEUROMUSCULAR REEDUCATION: CPT

## 2024-07-31 NOTE — PROGRESS NOTES
"Daily Note     Today's date: 2024  Patient name: Yaya Rodriguez  : 1955  MRN: [id number]  Referring provider: Tracy Moore MD  Dx:   Encounter Diagnosis     ICD-10-CM    1. Imbalance  R26.89       2. Dizziness  R42             Subjective: Arrived to therapy with complaints of feeling \"disgusted\" because he missed his OT appt.     Objective: See treatment diary below    TA:  Reviewed schedule with patient to prevent patient missing his appointments again x 10 minutes      NMR:   - STS with feet on medium RR + holding 20# TT  - 200 feet holding 20# TT (2 sets)   - Habituation exercise with picking cones off the ground    - Picked up 5 cones without a rest break (2 sets with and without 12# TT)     - \"off balance\" increased to 4-5/10, with A-P swaying with increased time   - Picked up 1 cone from 14\" height, moderate swaying   - Picked up 1 cone from 20\" height, little to no swaying     - 2 cones : little to no swaying A-P swaying    - 5 cones: little to no swaying A-P swaying   -  Picked up 1 cone from 16\" height, little to no swaying     - 2 cones : little to no swaying A-P swaying    - 5 cones: little to no swaying A-P swaying    TE:  Exercises  - Upper trap stretch- 30 sec hold x 2 bilaterally        Assessment: Patient tolerated PT session fairly well. Patient completed tidal tank exercises without any increase in dizziness or imbalance. Noted increased A-P sway when patient removed tidal tank from patient. Continue A-P swaying with bending down to  cone. Increased the height of the cones and patient demonstrated moderate swaying when picking up cone below 14\"; minimal swaying noted at both 16-20\" height of the cone. Pt also reported improved symptoms at higher height. Plan to habituate at 16\" by increase number of reps before reducing the height of the cone.   Plan to continue with balance activities with gaze stabilization, dual tasking, and habituation.    Plan:  Continue with PLOC - " progress as appropriate. Gaze stabilization/habituation/balance with cognitive/motor dual tasking.          Outcome Measures Initial Eval  7/1/2024        5xSTS 7.38 sec without UE support        TUG  - Regular  - Cognitive    8.38 sec  - 9.95 (subtracting 3 from 100, multiple errors)           10 meter 1.14         A 23/30        DGI Defer         mCTSIB  - FTEO (firm)  - FTEC (firm)  - FTEO (foam)  - FTEC (foam) - 30 sec  - 30 sec (+)  - 30 secs  - 30 sec (+)

## 2024-08-02 ENCOUNTER — OFFICE VISIT (OUTPATIENT)
Facility: CLINIC | Age: 69
End: 2024-08-02
Payer: MEDICARE

## 2024-08-02 DIAGNOSIS — H53.9 VISION CHANGES: Primary | ICD-10-CM

## 2024-08-02 DIAGNOSIS — R41.9 COGNITIVE COMPLAINTS: ICD-10-CM

## 2024-08-02 PROCEDURE — 97112 NEUROMUSCULAR REEDUCATION: CPT

## 2024-08-02 PROCEDURE — 97530 THERAPEUTIC ACTIVITIES: CPT

## 2024-08-02 NOTE — PROGRESS NOTES
OT Daily Note     Today's date: 2024  Patient name: Yaya Rodriguez  : 1955  MRN: [id number]  Referring provider: Tracy Moore MD  Dx:   Encounter Diagnoses   Name Primary?    Vision changes Yes    Cognitive complaints        Start Time: 930  Stop Time: 1015  Total time in clinic (min): 45 minutes        PLAN OF CARE START: 2024  PLAN OF CARE END: 10/16/2024  FREQUENCY: 2-3x per week for 8-12 weeks  PRECAUTIONS balance deficits     Subjective: Pt reported no significant change in status since previous session.     Objective: See treatment below.                NMR:  -Pt completed BITS Complex Array Sequence task (letters only). Pt asked to name foods that begin with prompted letter. Pt completed 26 hits in 9 minutes and 4 seconds with 20.94 second reaction time and 100% accuracy. Focused on attention, recall, scanning, saccades. Pt required mod verbal cues for recall.   -Pt completed BITS memory task with 5 word sequences. Pt completed 15 successful trials in 5 minutes with 75% accuracy. Focused on  skills, scanning, saccades, attention, working memory, and recall.   -Pt completed BITS Charts Puzzles with visual stimuli and 10 second flash time. Pt completed 5 out of 5 successful trials in 2 minutes and 22 seconds with 100% accuracy and 6.17 response time. Focused on attention and  skills.      TA:   -completed 120 number board with serial subtraction by 4; pt given categories (taped to anterior vertical surface). Focused on attention, recall, following multi step directions, saccades, and convergence.       Assessment: Tolerated treatment well. Pt demonstrated unsteadiness and difficulty maintaining balance following sit to stand transfer. Pt demonstrating in session impaired recall, OM teaming/fusion, convergence insufficiency. Pt would benefit from continued OT services to address these deficits      Plan: Continued skilled OT per POC.        SHORT TERM GOALS    Pt will demo G recall  of 75% of verbal/written information utilizing memory strategy of choice for improved delayed memory   Pt will increase delayed recall being able to recall 3 items on RBANs in LTM   Pt will increase oculomotor control for improved saccades, con/divergent tasks for improved reading, board to table tasks with minimal increase in symptoms 4 weeks    LONG TERM GOALS:     Memory     Pt will demo G recall of 85% of verbal/written information utilizing memory strategy of choice for improved delayed memory   Pt will increase delayed recall being able to recall 5 items on RBANs in LTM   Pt will improve overall score on RBANs to within average ranges.   Pt will increase oculomotor control for improved saccades, con/divergent tasks for improved reading, board to table tasks with no increase in symptoms in 12 weeks

## 2024-08-06 ENCOUNTER — OFFICE VISIT (OUTPATIENT)
Facility: CLINIC | Age: 69
End: 2024-08-06
Payer: MEDICARE

## 2024-08-06 ENCOUNTER — EVALUATION (OUTPATIENT)
Facility: CLINIC | Age: 69
End: 2024-08-06
Payer: MEDICARE

## 2024-08-06 DIAGNOSIS — H53.9 VISION CHANGES: Primary | ICD-10-CM

## 2024-08-06 DIAGNOSIS — R42 DIZZINESS: ICD-10-CM

## 2024-08-06 DIAGNOSIS — R41.9 COGNITIVE COMPLAINTS: ICD-10-CM

## 2024-08-06 DIAGNOSIS — R26.89 IMBALANCE: Primary | ICD-10-CM

## 2024-08-06 PROCEDURE — 97112 NEUROMUSCULAR REEDUCATION: CPT

## 2024-08-06 PROCEDURE — 97530 THERAPEUTIC ACTIVITIES: CPT

## 2024-08-06 NOTE — PROGRESS NOTES
OT Daily Note     Today's date: 2024  Patient name: Yaya Rodriguez  : 1955  MRN: [id number]  Referring provider: Tracy Moore MD  Dx:   Encounter Diagnoses   Name Primary?    Vision changes Yes    Cognitive complaints          Start Time: 845  Stop Time: 930  Total time in clinic (min): 45 minutes        PLAN OF CARE START: 2024  PLAN OF CARE END: 10/16/2024  FREQUENCY: 2-3x per week for 8-12 weeks  PRECAUTIONS balance deficits     Subjective: Pt reported no significant change in status since previous session.     Objective: See treatment below.                NMR:  -Spot it with two cards placed posterior on mirror with instructions to alternating turning R and L. Completed to address saccades, pursuits, ambient vision, dynamic head turns, convergence and accomodation.    -marker pushups completed to address convergence.  Pt instructed to add this exercise to his HEP. Pt stated after completion of activity he noted an increase in disorientation.    -fill in the blank crossword puzzle with fill in words, word bank placed posteriorly on mirror to incorporate dynamic head turns, instructed to place bananagrams on magnetic tray to complete fill in the blank crossword. Completed to address working memory, recall, attention,  skills, visual scanning and functional cognition.       Assessment: Tolerated treatment well. Pt demonstrated unsteadiness and difficulty maintaining balance following sit to stand transfer. Pt demonstrating in session impaired recall, OM teaming/fusion, convergence insufficiency. Pt would benefit from continued OT services to address these deficits      Plan: Continued skilled OT per POC.        SHORT TERM GOALS    Pt will demo G recall of 75% of verbal/written information utilizing memory strategy of choice for improved delayed memory   Pt will increase delayed recall being able to recall 3 items on RBANs in LTM   Pt will increase oculomotor control for improved  saccades, con/divergent tasks for improved reading, board to table tasks with minimal increase in symptoms 4 weeks    LONG TERM GOALS:     Memory     Pt will demo G recall of 85% of verbal/written information utilizing memory strategy of choice for improved delayed memory   Pt will increase delayed recall being able to recall 5 items on RBANs in LTM   Pt will improve overall score on RBANs to within average ranges.   Pt will increase oculomotor control for improved saccades, con/divergent tasks for improved reading, board to table tasks with no increase in symptoms in 12 weeks
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
## 2024-08-07 ENCOUNTER — OFFICE VISIT (OUTPATIENT)
Facility: CLINIC | Age: 69
End: 2024-08-07
Payer: MEDICARE

## 2024-08-07 DIAGNOSIS — H53.9 VISION CHANGES: Primary | ICD-10-CM

## 2024-08-07 DIAGNOSIS — R26.89 IMBALANCE: Primary | ICD-10-CM

## 2024-08-07 DIAGNOSIS — R41.9 COGNITIVE COMPLAINTS: ICD-10-CM

## 2024-08-07 DIAGNOSIS — R42 DIZZINESS: ICD-10-CM

## 2024-08-07 PROCEDURE — 97112 NEUROMUSCULAR REEDUCATION: CPT | Performed by: OCCUPATIONAL THERAPIST

## 2024-08-07 PROCEDURE — 97112 NEUROMUSCULAR REEDUCATION: CPT

## 2024-08-07 NOTE — PROGRESS NOTES
"OT Daily Note     Today's date: 2024  Patient name: Yaya Rodriguez  : 1955  MRN: [id number]  Referring provider: Tracy Moore MD  Dx:   Encounter Diagnoses   Name Primary?    Vision changes Yes    Cognitive complaints            Start Time: 1015  Stop Time: 1100  Total time in clinic (min): 45 minutes        PLAN OF CARE START: 2024  PLAN OF CARE END: 10/16/2024  FREQUENCY: 2-3x per week for 8-12 weeks  PRECAUTIONS balance deficits     Subjective: \"I went for a walk around the complex before I came in today\"    Objective: See treatment below.                NMR:  -pencil/pen jumps 2x10 performed to address convergence. Pt was able to maintain physiological diplopia throughout exercise. Pt did not report any headache increase with this activity.     -abbreviation crossword puzzle performed to address EF skills, saccades, pursuits, convergence, accomodation, and attention. Completed in checkerboard cubby to address peripheral vision integration. Pt preformed standing on foam to address standing balance challenge.     -qbits performed to address EF skills, problem solving, saccades, and  skills. Completed in checkerboard cubby to address peripheral vision integration with cubes placed on floor to challenge visual and vestibular systems. Pt reported that when he feels like he's concentrated on something he does not feel as disoriented compared to when he is walking in an open room. Required occulusion of row by row to assist with  skills and problem solving aspect of activity. SBA while pt was performing activity.     Assessment: Tolerated treatment well. Pt demonstrated unsteadiness and difficulty maintaining balance following sit to stand transfer. Pt demonstrating in session impaired recall, OM teaming/fusion, convergence insufficiency. Pt would benefit from continued OT services to address these deficits      Plan: Continued skilled OT per POC.        SHORT TERM GOALS    Pt will demo G " recall of 75% of verbal/written information utilizing memory strategy of choice for improved delayed memory   Pt will increase delayed recall being able to recall 3 items on RBANs in LTM   Pt will increase oculomotor control for improved saccades, con/divergent tasks for improved reading, board to table tasks with minimal increase in symptoms 4 weeks    LONG TERM GOALS:     Memory     Pt will demo G recall of 85% of verbal/written information utilizing memory strategy of choice for improved delayed memory   Pt will increase delayed recall being able to recall 5 items on RBANs in LTM   Pt will improve overall score on RBANs to within average ranges.   Pt will increase oculomotor control for improved saccades, con/divergent tasks for improved reading, board to table tasks with no increase in symptoms in 12 weeks

## 2024-08-07 NOTE — PROGRESS NOTES
"                                                    Daily Note     Today's date: 2024  Patient name: Yaya Rodriguez  : 1955  MRN: [id number]  Referring provider: Tracy Moore MD  Dx:   Encounter Diagnosis     ICD-10-CM    1. Imbalance  R26.89       2. Dizziness  R42             Subjective: Arrived to therapy with complaints of feeling \"disgusted\" because he missed his OT appt.     Objective: See treatment diary below    TA:  Reviewed schedule with patient to prevent patient missing his appointments again x 10 minutes        TE:   - Upper trap stretch bilateral: 45 seconds x 3   - Feeling \"imbalance\" when SB towards the R  - Levator stretch bilateral: 45 seconds x 3  - Chin tucks x 30  - Seated cervical side bending x 20   - Cranio-cervical flexion treaining    5 sec hold at 30 mmhg, 40 mmhg, 50 mmhg x 2    Manual:  - STM to left levator, upper trap, and upp/mid paraspinal of cervical spine x 10    Access Code: [email or web address]  URL: [email or web address]  Date: 2024  Prepared by: Francesca Arroyo-Jacqueline    Exercises  - Seated Cervical Sidebending Stretch  - 1 x daily - 7 x weekly - 3 sets - 30s hold  - Seated Levator Scapulae Stretch  - 1 x daily - 7 x weekly - 3 sets - 30s hold  - Standing Cervical Sidebending AROM  - 1 x daily - 7 x weekly - 3 sets - 10 reps  - Seated Cervical Retraction  - 1 x daily - 7 x weekly - 3 sets - 10 reps        Assessment: Patient tolerated PT session fairly well. Initiated cervical ROM based on cervical limitations and increase feeling of imbalance with palpation of the L cervical paraspinals. Minimal improvement of feeling \"off\" or \"disoriented\" with cervical ROM exercises. Patient demonstrate increased tightness in both sidebending and rotation. Patient did well with passive stretching. No increase in off balance with seated chin tucks. Plan to continue with balance activities with balance and cervical strengthening/ROM.     Plan:  Continue with PLOC - " progress as appropriate. Gaze stabilization/habituation/balance with cognitive/motor dual tasking + cervical stretching/strengthening.           Outcome Measures Initial Eval  7/1/2024        5xSTS 7.38 sec without UE support        TUG  - Regular  - Cognitive    8.38 sec  - 9.95 (subtracting 3 from 100, multiple errors)           10 meter 1.14         A 23/30        DGI Defer         mCTSIB  - FTEO (firm)  - FTEC (firm)  - FTEO (foam)  - FTEC (foam) - 30 sec  - 30 sec (+)  - 30 secs  - 30 sec (+)

## 2024-08-12 ENCOUNTER — APPOINTMENT (OUTPATIENT)
Facility: CLINIC | Age: 69
End: 2024-08-12
Payer: MEDICARE

## 2024-08-13 ENCOUNTER — OFFICE VISIT (OUTPATIENT)
Facility: CLINIC | Age: 69
End: 2024-08-13
Payer: MEDICARE

## 2024-08-13 DIAGNOSIS — H53.9 VISION CHANGES: Primary | ICD-10-CM

## 2024-08-13 DIAGNOSIS — R41.9 COGNITIVE COMPLAINTS: ICD-10-CM

## 2024-08-13 DIAGNOSIS — R26.89 IMBALANCE: Primary | ICD-10-CM

## 2024-08-13 DIAGNOSIS — R42 DIZZINESS: ICD-10-CM

## 2024-08-13 PROCEDURE — 97112 NEUROMUSCULAR REEDUCATION: CPT | Performed by: OCCUPATIONAL THERAPIST

## 2024-08-13 PROCEDURE — 97112 NEUROMUSCULAR REEDUCATION: CPT

## 2024-08-13 NOTE — PROGRESS NOTES
"OT Daily Note     Today's date: 2024  Patient name: Yaya Rodriguez  : 1955  MRN: [id number]  Referring provider: Tracy Moore MD  Dx:   Encounter Diagnoses   Name Primary?    Vision changes Yes    Cognitive complaints              Start Time: 845  Stop Time: 930  Total time in clinic (min): 45 minutes        PLAN OF CARE START: 2024  PLAN OF CARE END: 10/16/2024  FREQUENCY: 2-3x per week for 8-12 weeks  PRECAUTIONS balance deficits     Subjective: \"I went for a walk around the complex before I came in today\"    Objective: See treatment below.                NMR:  -Reaching and pulling squiggs off bottom portion of mirror and transferring to top. Pt required use of visual cue to for categories of each color. Activity completed to address standing balance, vestibular input challenge, recall, and divided attention.     -BITS visual scanning number to letter activity with central fixation completed to address recall, attention, working memory, and dual tasking. Required mod-max cues for recall of previous letter/number tapped. Pt initially asked to state a food with each letter, although he was unable to focus and attend to all components and downgraded activity to complete with success. Pt became frustrated with activity towards the end due to cognitive demand and memory skills.     -Therapist and pt discussed memory strategies and stress management techniques, as pt was discussing when he is at home and becomes frustrated he will throw the item on the floor and just forget about it and go to bed. At this time pt did not want any further information in regards to take home strategies.      Assessment: Tolerated treatment well. Pt demonstrated unsteadiness and difficulty maintaining balance following sit to stand transfer. Pt demonstrating in session impaired recall, OM teaming/fusion, convergence insufficiency. Pt would benefit from continued OT services to address these deficits      Plan: " Continued skilled OT per POC.        SHORT TERM GOALS    Pt will demo G recall of 75% of verbal/written information utilizing memory strategy of choice for improved delayed memory   Pt will increase delayed recall being able to recall 3 items on RBANs in LTM   Pt will increase oculomotor control for improved saccades, con/divergent tasks for improved reading, board to table tasks with minimal increase in symptoms 4 weeks    LONG TERM GOALS:     Memory     Pt will demo G recall of 85% of verbal/written information utilizing memory strategy of choice for improved delayed memory   Pt will increase delayed recall being able to recall 5 items on RBANs in LTM   Pt will improve overall score on RBANs to within average ranges.   Pt will increase oculomotor control for improved saccades, con/divergent tasks for improved reading, board to table tasks with no increase in symptoms in 12 weeks

## 2024-08-13 NOTE — PROGRESS NOTES
"                                                    Daily Note     Today's date: 2024  Patient name: Yaya Rodriguez  : 1955  MRN: [id number]  Referring provider: Tracy Moore MD  Dx:   Encounter Diagnosis     ICD-10-CM    1. Imbalance  R26.89       2. Dizziness  R42             Subjective: Arrived to therapy with reports of increase fatigue over the weekend.    Objective: See treatment diary below    BP: 133/80 mmhg    NMR:  - FT, EC, foam 1 min x 3  - foam, FA, EC 1 min x 3  - foam FA, EC, HN 1 min x 3  - foam, FA, EC, HT 1 min x 3   - Tandem stance 1 min x 4 (x2 on each side)  - Flat surface, FA, EC HT 1 min x 3  - Flat surface, FA, EC, HN 1 min x 3           Access Code: [email or web address]  URL: [email or web address]  Date: 2024  Prepared by: Francesca Arroyo-Matsuba    Exercises  - Seated Cervical Sidebending Stretch  - 1 x daily - 7 x weekly - 3 sets - 30s hold  - Seated Levator Scapulae Stretch  - 1 x daily - 7 x weekly - 3 sets - 30s hold  - Standing Cervical Sidebending AROM  - 1 x daily - 7 x weekly - 3 sets - 10 reps  - Seated Cervical Retraction  - 1 x daily - 7 x weekly - 3 sets - 10 reps        Assessment: Patient tolerated PT session fairly well. Session focused on static balance with eyes closed and head turns to increase vestibular proprioception. Pt had no feelings of \"disorientation\" with vertical head movements. Mild feeling of \"disorientation\" with moving his head side. Pt had significant A-P swaying when attempting to maintain his feet together. Noted poor coordination with head rotation,  Plan to continue with balance activities with balance and cervical strengthening/ROM.     Plan:  Continue with PLOC - progress as appropriate. Gaze stabilization/habituation/balance with cognitive/motor dual tasking + cervical stretching/strengthening.           Outcome Measures Initial Eval  2024        5xSTS 7.38 sec without UE support        TUG  - Regular  - Cognitive    8.38 " sec  - 9.95 (subtracting 3 from 100, multiple errors)           10 meter 1.14         FGA 23/30        DGI Defer         mCTSIB  - FTEO (firm)  - FTEC (firm)  - FTEO (foam)  - FTEC (foam) - 30 sec  - 30 sec (+)  - 30 secs  - 30 sec (+)

## 2024-08-14 ENCOUNTER — OFFICE VISIT (OUTPATIENT)
Facility: CLINIC | Age: 69
End: 2024-08-14
Payer: MEDICARE

## 2024-08-14 ENCOUNTER — EVALUATION (OUTPATIENT)
Facility: CLINIC | Age: 69
End: 2024-08-14
Payer: MEDICARE

## 2024-08-14 DIAGNOSIS — H53.9 VISION CHANGES: Primary | ICD-10-CM

## 2024-08-14 DIAGNOSIS — R41.9 COGNITIVE COMPLAINTS: ICD-10-CM

## 2024-08-14 DIAGNOSIS — R26.89 IMBALANCE: Primary | ICD-10-CM

## 2024-08-14 DIAGNOSIS — R42 DIZZINESS: ICD-10-CM

## 2024-08-14 PROCEDURE — 97112 NEUROMUSCULAR REEDUCATION: CPT

## 2024-08-14 PROCEDURE — 96125 COGNITIVE TEST BY HC PRO: CPT

## 2024-08-14 PROCEDURE — 97530 THERAPEUTIC ACTIVITIES: CPT

## 2024-08-14 NOTE — PROGRESS NOTES
Daily Note     Today's date: 2024  Patient name: Yaya Rodriguez  : 1955  MRN: [id number]  Referring provider: Tracy Moore MD  Dx:   Encounter Diagnosis     ICD-10-CM    1. Imbalance  R26.89       2. Dizziness  R42             Subjective: Arrived to therapy without complaints  Objective: See treatment diary below      NMR:  Cervical proprioception training with targets, horizontal movement x 2 minutes   - 2 minutes on foam  Cervical proprioception training with targets, vertical movements x 2 minutes   - 2 minutes on foam  Forward ambulation + ball toss on foam pad x 3 minutes  Lateral stepping on balance beam foam pad x 3 minutes      TE:  Cervical SB stretch 1 min x 2 each direction  Levator stretch  1 min x 2 each side        Access Code: [email or web address]  URL: [email or web address]  Date: 2024  Prepared by: Francesca Arroyo-Matsuba    Exercises  - Seated Cervical Sidebending Stretch  - 1 x daily - 7 x weekly - 3 sets - 30s hold  - Seated Levator Scapulae Stretch  - 1 x daily - 7 x weekly - 3 sets - 30s hold  - Standing Cervical Sidebending AROM  - 1 x daily - 7 x weekly - 3 sets - 10 reps  - Seated Cervical Retraction  - 1 x daily - 7 x weekly - 3 sets - 10 reps        Assessment: Patient tolerated PT session fairly well. No dizziness with cervical proprioception exercise, Also, demonstrated good control of neck motion without over/under shooting targets. Pt most challenged with excessive A-P swaying with forward tandem on the balance beam. Increased time to recover balance   Plan to continue with balance activities with balance and cervical strengthening/ROM.     Plan:  Continue with PLOC - progress as appropriate. Gaze stabilization/habituation/balance with cognitive/motor dual tasking + cervical stretching/strengthening.           Outcome Measures Initial Eval  2024        5xSTS 7.38 sec without UE support        TUG  -  Regular  - Cognitive    8.38 sec  - 9.95 (subtracting 3 from 100, multiple errors)           10 meter 1.14         [facility] 23/30        DGI Defer         mCTSIB  - FTEO (firm)  - FTEC (firm)  - FTEO (foam)  - FTEC (foam) - 30 sec  - 30 sec (+)  - 30 secs  - 30 sec (+)
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
## 2024-08-20 ENCOUNTER — APPOINTMENT (OUTPATIENT)
Facility: CLINIC | Age: 69
End: 2024-08-20
Payer: MEDICARE

## 2024-08-21 ENCOUNTER — OFFICE VISIT (OUTPATIENT)
Facility: CLINIC | Age: 69
End: 2024-08-21
Payer: MEDICARE

## 2024-08-21 DIAGNOSIS — H53.9 VISION CHANGES: Primary | ICD-10-CM

## 2024-08-21 DIAGNOSIS — R26.89 IMBALANCE: Primary | ICD-10-CM

## 2024-08-21 DIAGNOSIS — R42 DIZZINESS: ICD-10-CM

## 2024-08-21 DIAGNOSIS — R41.9 COGNITIVE COMPLAINTS: ICD-10-CM

## 2024-08-21 PROCEDURE — 97112 NEUROMUSCULAR REEDUCATION: CPT | Performed by: OCCUPATIONAL THERAPIST

## 2024-08-21 PROCEDURE — 97112 NEUROMUSCULAR REEDUCATION: CPT

## 2024-08-21 NOTE — PROGRESS NOTES
OT Daily Note     Today's date: 2024  Patient name: Yaya Rodriguez  : 1955  MRN: [id number]  Referring provider: Tracy Moore MD  Dx:   Encounter Diagnoses   Name Primary?    Vision changes Yes    Cognitive complaints        Start Time: 843  Stop Time: 925  Total time in clinic (min): 42 minutes    PLAN OF CARE START: 2024  PLAN OF CARE END: 10/16/2024  FREQUENCY: 2-3x per week for 8-12 weeks  PRECAUTIONS balance deficits     Subjective: Pt had no new reports since last session     Objective: See treatment below.                NMR:  -Thumb push ups completed to address convergence and divergence.     -Donut puzzle while walking on foam beam to complete with focus on EF skills,  skills, recall, attention and working memory. Pt was able to maintain balance while walking on foam beam throughout activity. Min cues for  skills while problem solving puzzle. Pt had a 3/10 headache at end of activity.     -letter number marble activity completed while stating an item beginning with each letter to address attention, saccades, pursuits, recall. Completed seated due to pt feeling fatigued after previous activity.     -BITS geoboard activity completed to address  skills, attention and hand-eye coordination. Completed by copying mirror image with success.     -Discussed starting PNE group in 2 weeks with pt. He was agreeable to participate in group, and was informed of day/time.     Assessment: Tolerated treatment well. Pt demonstrated unsteadiness while on foam beam, although able to maintain balance, fatigued after completion of activity. Pt demonstrating in session impaired recall, OM teaming/fusion, convergence insufficiency. Pt would benefit from continued OT services to address these deficits      Plan: Continued skilled OT per POC.        SHORT TERM GOALS    Pt will demo G recall of 75% of verbal/written information utilizing memory strategy of choice for improved delayed memory   Pt will  increase delayed recall being able to recall 3 items on RBANs in LTM   Pt will increase oculomotor control for improved saccades, con/divergent tasks for improved reading, board to table tasks with minimal increase in symptoms 4 weeks    LONG TERM GOALS:     Memory     Pt will demo G recall of 85% of verbal/written information utilizing memory strategy of choice for improved delayed memory   Pt will increase delayed recall being able to recall 5 items on RBANs in LTM   Pt will improve overall score on RBANs to within average ranges.   Pt will increase oculomotor control for improved saccades, con/divergent tasks for improved reading, board to table tasks with no increase in symptoms in 12 weeks

## 2024-08-21 NOTE — PROGRESS NOTES
Daily Note     Today's date: 2024  Patient name: Yaya Rodriguez  : 1955  MRN: [id number]  Referring provider: Tracy Moore MD  Dx:   Encounter Diagnosis     ICD-10-CM    1. Imbalance  R26.89       2. Dizziness  R42             Subjective: Patient reports to PT session with no new issues or complaints; denies headaches or dizziness.       Objective: See treatment diary below      NMR:  - STS from standard chair w/ foam pad under feet + playing Spot-It x 2 minutes  - Sidestepping on foam beam + washer maze x 3 minutes  - Step ups to medium river rocks + chain linking x 2 minutes  - Bending over to retrieve bean bags while playing cornhole x 20 throws  - Bending over to retrieve Squigz + FWD lunge to BOSU and place on mirror while naming items based on associated color      Assessment: Patient tolerated PT session well today with focus on balance based exercises, compliant surfaces, and use of external fixation. While patient demonstrates considerable postural instability when negotiating compliant surfaces, particularly foam beam, he is able to recover independently with use of hip and ankle strategy. During session, patient dropped chain link to floor and was noted to have significant postural sway upon return to standing that diminished with return to activity.  Plan to continue with balance activities with balance and cervical strengthening/ROM.       Plan:  Continue with PLOC - progress as appropriate. Gaze stabilization/habituation/balance with cognitive/motor dual tasking + cervical stretching/strengthening.           Outcome Measures Initial Eval  2024        5xSTS 7.38 sec without UE support        TUG  - Regular  - Cognitive    8.38 sec  - 9.95 (subtracting 3 from 100, multiple errors)           10 meter 1.14         FGA         DGI Defer         mCTSIB  - FTEO (firm)  - FTEC (firm)  - FTEO (foam)  - FTEC (foam) - 30 sec  - 30  sec (+)  - 30 secs  - 30 sec (+)                                        Access Code: [email or web address]  URL: [email or web address]  Date: 08/07/2024  Prepared by: Francesca Arroyo-Dakshauba    Exercises  - Seated Cervical Sidebending Stretch  - 1 x daily - 7 x weekly - 3 sets - 30s hold  - Seated Levator Scapulae Stretch  - 1 x daily - 7 x weekly - 3 sets - 30s hold  - Standing Cervical Sidebending AROM  - 1 x daily - 7 x weekly - 3 sets - 10 reps  - Seated Cervical Retraction  - 1 x daily - 7 x weekly - 3 sets - 10 reps

## 2024-08-26 ENCOUNTER — APPOINTMENT (OUTPATIENT)
Facility: CLINIC | Age: 69
End: 2024-08-26
Payer: MEDICARE

## 2024-08-27 ENCOUNTER — OFFICE VISIT (OUTPATIENT)
Facility: CLINIC | Age: 69
End: 2024-08-27
Payer: MEDICARE

## 2024-08-27 ENCOUNTER — OFFICE VISIT (OUTPATIENT)
Dept: NEUROLOGY | Facility: CLINIC | Age: 69
End: 2024-08-27
Payer: MEDICARE

## 2024-08-27 VITALS
SYSTOLIC BLOOD PRESSURE: 151 MMHG | BODY MASS INDEX: 29.12 KG/M2 | DIASTOLIC BLOOD PRESSURE: 66 MMHG | HEIGHT: 71 IN | WEIGHT: 208 LBS | HEART RATE: 73 BPM

## 2024-08-27 DIAGNOSIS — E55.9 VITAMIN D DEFICIENCY: ICD-10-CM

## 2024-08-27 DIAGNOSIS — R26.89 IMBALANCE: Primary | ICD-10-CM

## 2024-08-27 DIAGNOSIS — H53.9 VISION CHANGES: Primary | ICD-10-CM

## 2024-08-27 DIAGNOSIS — G44.329 CHRONIC POST-TRAUMATIC HEADACHE, NOT INTRACTABLE: Primary | ICD-10-CM

## 2024-08-27 DIAGNOSIS — R42 DIZZINESS: ICD-10-CM

## 2024-08-27 DIAGNOSIS — R41.9 COGNITIVE COMPLAINTS: ICD-10-CM

## 2024-08-27 DIAGNOSIS — G43.009 MIGRAINE WITHOUT AURA AND WITHOUT STATUS MIGRAINOSUS, NOT INTRACTABLE: ICD-10-CM

## 2024-08-27 DIAGNOSIS — R53.83 FATIGUE: ICD-10-CM

## 2024-08-27 PROCEDURE — 97112 NEUROMUSCULAR REEDUCATION: CPT

## 2024-08-27 PROCEDURE — G2211 COMPLEX E/M VISIT ADD ON: HCPCS | Performed by: PSYCHIATRY & NEUROLOGY

## 2024-08-27 PROCEDURE — 97112 NEUROMUSCULAR REEDUCATION: CPT | Performed by: OCCUPATIONAL THERAPIST

## 2024-08-27 PROCEDURE — G2212 PROLONG OUTPT/OFFICE VIS: HCPCS | Performed by: PSYCHIATRY & NEUROLOGY

## 2024-08-27 PROCEDURE — 99215 OFFICE O/P EST HI 40 MIN: CPT | Performed by: PSYCHIATRY & NEUROLOGY

## 2024-08-27 RX ORDER — LOSARTAN POTASSIUM 50 MG/1
50 TABLET ORAL DAILY
COMMUNITY

## 2024-08-27 NOTE — PROGRESS NOTES
OT Daily Note     Today's date: 2024  Patient name: Yaya Rodriguez  : 1955  MRN: [id number]  Referring provider: Tracy Moore MD  Dx:   Encounter Diagnoses   Name Primary?    Vision changes Yes    Cognitive complaints          Start Time: 845  Stop Time: 930  Total time in clinic (min): 45 minutes    PLAN OF CARE START: 2024  PLAN OF CARE END: 10/16/2024  FREQUENCY: 2-3x per week for 8-12 weeks  PRECAUTIONS balance deficits     Subjective: Pt had no new reports since last session     Objective: See treatment below.                NMR:  -marker push ups completed to address convergence and divergence. Completed in checkerboard cubby for increased peripheral vision integration challenge.      -Completed pixy cubes in checkerboard cubby while standing on foam to address EF skills, problem solving, saccades, and  skills. Completed in checkerboard cubby to address peripheral vision integration with cubes placed on floor to challenge visual and vestibular systems and dynamic head turns.       -card sorting task with cog component to state foods for black cards and states for red cards while sorting cards by suit. Completed to address recall, attention, dual tasking, EF skills, and functional cognition. Min cues for recall component. Visual cue card provided for beginning of activity, then removed for added memory challenge.     -BITS: reverse number tapping from 50 completed to address attention, recall, sequencing and functional cognition. Completed with 90% accuracy and 67% for fixation attention. Completed while standing on foam for increased standing balance challenge.      -BITS peripheral vision activity completed to address peripheral vision integration by maintaining focus on central fixation; target size 8. Completed while standing on foam for increased standing balance challenge.      Assessment: Tolerated treatment well. Pt demonstrated unsteadiness while performing bending  over/standing up, although able to maintain balance. Pt demonstrated difficulty with recall component during card sorting task. Pt demonstrating in session impaired recall, OM teaming/fusion, convergence insufficiency. Pt would benefit from continued OT services to address these deficits      Plan: Continued skilled OT per POC.        SHORT TERM GOALS    Pt will demo G recall of 75% of verbal/written information utilizing memory strategy of choice for improved delayed memory   Pt will increase delayed recall being able to recall 3 items on RBANs in LTM   Pt will increase oculomotor control for improved saccades, con/divergent tasks for improved reading, board to table tasks with minimal increase in symptoms 4 weeks    LONG TERM GOALS:     Memory     Pt will demo G recall of 85% of verbal/written information utilizing memory strategy of choice for improved delayed memory   Pt will increase delayed recall being able to recall 5 items on RBANs in LTM   Pt will improve overall score on RBANs to within average ranges.   Pt will increase oculomotor control for improved saccades, con/divergent tasks for improved reading, board to table tasks with no increase in symptoms in 12 weeks

## 2024-08-27 NOTE — PATIENT INSTRUCTIONS
"  Please obtain the labs anytime in the near future for monitoring on this medication.  -As we now have discussed at multiple visits please obtain the labs so that I can monitor your electrolytes  - I am glad that you stopped hydrochlorothiazide after we discussed with Dr. Argueta just because as we discussed acetazolamide and hydrochlorothiazide both can lower your potassium and act similarly, we will see what your labs show hopefully today      Please do follow-up with eye doctor for your regularly scheduled eye exam and please try and obtain the note for my review or have it faxed to us if possible.    Please follow up with sleep medicine regarding the sleep apnea and the CPAP machine with Dr. Ortiz as I think you have not seen him since 11/2022  -As we discussed in the past I placed a referral to the ENT/otolaryngology/ear nose and throat doctors and specifically know that Dr. Shafer with [facility] he is one of the doctors in the region who does the inspire device if this is something you are interested in as I would not give up on treating her sleep apnea - [phone]  - At some point, no pressure, consider following up for hearing test because if you do need hearing aids not wearing them could increase risk of dementia    -Continue to follow with primary care provider regarding secondary stroke prevention - high blood pressure, high cholesterol     Fall precautions     - \"Rewire Your Anxious Brain: How to Use the Neuroscience of Fear to End Anxiety, Panic, and Worry\" By Abilio PhD  - I recommend continued counselor for post traumatic stress     Headache/migraine treatment:   Abortive medications (for immediate treatment of a headache):   It is ok to take ibuprofen, acetaminophen or naproxen (Advil, Tylenol,  Aleve, Excedrin) if they help your headaches you should limit these to No more than 3 times a week to avoid medication overuse/rebound headaches.       Over the counter preventive supplements for " headaches/migraines (if you try, try for 3 months straight)  (to take every day to help prevent headaches - not to take at the time of headache):  There are combo pills online of these - none of which regulated by FDA and double check dosing - take appropriate dose only once a day- preventa migraine, migravent, mind ease, migrelief   [x] Magnesium 400mg daily (If any diarrhea or upset stomach, decrease dose  as tolerated)        Prescription preventive medications for headaches/migraines   (to take every day to help prevent headaches - not to take at the time of headache):  [x]  I suggest trial of lower dose diamox than we typically use in more severe cases -  -     acetaZOLAMIDE (DIAMOX) -continue to take at 7 AM and 7 PM for now and we discussed the plan what the labs show we may be able to switch it to 3 times a day    If we were to switch it to 3 times a day in the future I would take it around 7 AM, 3 PM and 10 PM -but wait to do this until we see what your labs show and if we do transition to 3 times a day before next visit and you do not feel better you could always go back to twice a day, as we discussed I suspect the long-acting capsules last or have at least peak effect more around 8-hour timeframe rather than the 12-hour timeframe.    We discussed certainly another option if you are taken at 7 AM and 7 PM if you feel worse between 3 PM and 7 PM you absolutely could take a 250 mg short acting tab at 3 PM as it could help with the wearing off effect       - if a lower dose is helpful, can stay lower, if higher dose causes side effects, go back to last tolerated dose.  If you get all the way up to the dose recommended and is not helping enough let me know as I will absolutely go higher.    - make sure to stay hydrated while on this as can cause dehydration since that is it's purpose to take fluid off.   - most common side effect is tingling of the nerves at times  - can cause electrolyte disturbances, but  not typically in any significant way. However, be cautious if taking with other meds that lower potassium like hydrochlorothiazide etc    *Typically these types of medications take time untill you see the benefit, although some may see improvement in days, often it may take weeks, especially if the medication is being titrated up to a beneficial level. Please contact us if there are any concerns or questions regarding the medication.     Lifestyle Recommendations:  [x] SLEEP - Maintain a regular sleep schedule: Adults need at least 7-8 hours of uninterrupted a night. Maintain good sleep hygiene:  Going to bed and waking up at consistent times, avoiding excessive daytime naps, avoiding caffeinated beverages in the evening, avoid excessive stimulation in the evening and generally using bed primarily for sleeping.  One hour before bedtime would recommend turning lights down lower, decreasing your activity (may read quietly, listen to music at a low volume). When you get into bed, should eliminate all technology (no texting, emailing, playing with your phone, iPad or tablet in bed).  [x] HYDRATION - Maintain good hydration.  Drink  2L of fluid a day (4 typical small water bottles)  [x] DIET - Maintain good nutrition. In particular don't skip meals and try and eat healthy balanced meals regularly.  [x] EXERCISE - physical exercise as we all know is good for you in many ways, and not only is good for your heart, but also is beneficial for your mental health, cognitive health and  chronic pain/headaches. I would encourage at the least 5 days of physical exercise weekly for at least 30 minutes.     Education and Follow-up  [x] Please call with any questions or concerns. Of course if any new concerning symptoms go to the emergency department.  [x] Follow up 3 months, sooner if needed

## 2024-08-27 NOTE — PROGRESS NOTES
Review of Systems   Constitutional:  Negative for appetite change, fatigue and fever.   HENT: Negative.  Negative for hearing loss, tinnitus, trouble swallowing and voice change.    Eyes: Negative.  Negative for photophobia, pain and visual disturbance.   Respiratory: Negative.  Negative for shortness of breath.    Cardiovascular: Negative.  Negative for palpitations.   Gastrointestinal: Negative.  Negative for nausea and vomiting.   Endocrine: Negative.  Negative for cold intolerance.   Genitourinary: Negative.  Negative for dysuria, frequency and urgency.   Musculoskeletal:  Negative for back pain, gait problem, myalgias, neck pain and neck stiffness.   Skin: Negative.  Negative for rash.   Allergic/Immunologic: Negative.    Neurological:  Positive for headaches (more frequents- not bothersome). Negative for dizziness, tremors, seizures, syncope, facial asymmetry, speech difficulty, weakness, light-headedness and numbness.   Hematological: Negative.  Does not bruise/bleed easily.   Psychiatric/Behavioral: Negative.  Negative for confusion, hallucinations and sleep disturbance.
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
## 2024-08-28 ENCOUNTER — OFFICE VISIT (OUTPATIENT)
Facility: CLINIC | Age: 69
End: 2024-08-28
Payer: MEDICARE

## 2024-08-28 DIAGNOSIS — R26.89 IMBALANCE: Primary | ICD-10-CM

## 2024-08-28 DIAGNOSIS — H53.9 VISION CHANGES: Primary | ICD-10-CM

## 2024-08-28 DIAGNOSIS — R41.9 COGNITIVE COMPLAINTS: ICD-10-CM

## 2024-08-28 DIAGNOSIS — R42 DIZZINESS: ICD-10-CM

## 2024-08-28 LAB — 25(OH)D3+25(OH)D2 SERPL-MCNC: 35 NG/ML (ref 30–100)

## 2024-08-28 PROCEDURE — 97112 NEUROMUSCULAR REEDUCATION: CPT

## 2024-08-28 NOTE — PROGRESS NOTES
"                                                    Daily Note     Today's date: 2024  Patient name: Yaya Rodriguez  : 1955  MRN: [id number]  Referring provider: Tracy Moore MD  Dx:   Encounter Diagnosis     ICD-10-CM    1. Imbalance  R26.89       2. Dizziness  R42             Subjective: \"I feel off everyday\"     Objective: See treatment diary below      NMR:  - Forward walking with 20#  feet x 2 ( 2 sets)  - Tandem stance on airex + moving rubber bands off can 2 minutes x 2  - Lateral stepping on BB + ball toss, naming car brands x 2 minutes   - Lateral stepping on BB + toe tap to balance pods naming food based on the color  x 2 minutes   - Step ups to balance pods + self ball toss x 2 minutes   - Agility ladder drills (in/outs) x 2 minutes   Agility ladder drills (in/outs) + bending over to UL blaze pod taps (random mode) x 2 minutes       Assessment: Patient tolerated PT session well today with continued focus on both cognitive and motor dual task. Pt is both challenged by motor and cognitive dual tasking especially when on non-complaint surface. Increased swaying and LOB as dual task load increases. Pt often demonstrates an A-P swaying with excessive trunk flexion/hip movement to recover LOB. Attempted to add cognitive load again to agility ladder and pt did demonstrate improvement compared to previous session especially when added cognitive task after patient practiced agility ladder only first. Plan to continue with balance activities with balance and dual task to reduce fall risk and maximize function.      Plan:  Continue with PLOC - progress as appropriate. Gaze stabilization/habituation/balance with cognitive/motor dual tasking + cervical stretching/strengthening.           Outcome Measures Initial Eval  2024        5xSTS 7.38 sec without UE support        TUG  - Regular  - Cognitive    8.38 sec  - 9.95 (subtracting 3 from 100, multiple errors)           10 meter 1.14       "   FGA 23/30        DGI Defer         mCTSIB  - FTEO (firm)  - FTEC (firm)  - FTEO (foam)  - FTEC (foam) - 30 sec  - 30 sec (+)  - 30 secs  - 30 sec (+)                                        Access Code: [email or web address]  URL: [email or web address]  Date: 08/07/2024  Prepared by: Francesca Arroyo-Dakshauba    Exercises  - Seated Cervical Sidebending Stretch  - 1 x daily - 7 x weekly - 3 sets - 30s hold  - Seated Levator Scapulae Stretch  - 1 x daily - 7 x weekly - 3 sets - 30s hold  - Standing Cervical Sidebending AROM  - 1 x daily - 7 x weekly - 3 sets - 10 reps  - Seated Cervical Retraction  - 1 x daily - 7 x weekly - 3 sets - 10 reps

## 2024-08-28 NOTE — PROGRESS NOTES
OT Daily Note     Today's date: 2024  Patient name: Yaya Rodriguez  : 1955  MRN: [id number]  Referring provider: Tracy Moore MD  Dx:   Encounter Diagnoses   Name Primary?    Vision changes Yes    Cognitive complaints          Start Time: 0800  Stop Time: 0844  Total time in clinic (min): 44 minutes    PLAN OF CARE START: 2024  PLAN OF CARE END: 10/16/2024  FREQUENCY: 2-3x per week for 8-12 weeks  PRECAUTIONS balance deficits     Subjective: Pt had no new reports since last session     Objective: See treatment below.                NMR:  -completed spot it task in checkered board focusing on saccades, visual-vestibular integration, tolerance of visual clutter   -completed x 10 reps of monocular lazy 8's focusing on pursuits required cues for pace.  -completed logical solutions of letters by location focusing on sustained attention, EF skills, direction following,  skills, completed in slant board for sustained convergence, also working on saccades ; completed with independence  -completed in slant board for sustained convergence of word search with arrows focusing on EF skills,  skills, sustained attention, saccades and convergence.    Assessment: Tolerated treatment well. Pt demonstrated unsteadiness while performing checkered board task. Pt demonstrating in session impaired recall, OM teaming/fusion, convergence insufficiency. Pt would benefit from continued OT services to address these deficits      Plan: Continued skilled OT per POC.        SHORT TERM GOALS    Pt will demo G recall of 75% of verbal/written information utilizing memory strategy of choice for improved delayed memory   Pt will increase delayed recall being able to recall 3 items on RBANs in LTM   Pt will increase oculomotor control for improved saccades, con/divergent tasks for improved reading, board to table tasks with minimal increase in symptoms 4 weeks    LONG TERM GOALS:     Memory     Pt will demo G recall of 85%  of verbal/written information utilizing memory strategy of choice for improved delayed memory   Pt will increase delayed recall being able to recall 5 items on RBANs in LTM   Pt will improve overall score on RBANs to within average ranges.   Pt will increase oculomotor control for improved saccades, con/divergent tasks for improved reading, board to table tasks with no increase in symptoms in 12 weeks

## 2024-09-03 ENCOUNTER — OFFICE VISIT (OUTPATIENT)
Facility: CLINIC | Age: 69
End: 2024-09-03
Payer: MEDICARE

## 2024-09-03 DIAGNOSIS — H53.9 VISION CHANGES: Primary | ICD-10-CM

## 2024-09-03 DIAGNOSIS — R26.89 IMBALANCE: Primary | ICD-10-CM

## 2024-09-03 DIAGNOSIS — R42 DIZZINESS: ICD-10-CM

## 2024-09-03 DIAGNOSIS — R41.9 COGNITIVE COMPLAINTS: ICD-10-CM

## 2024-09-03 PROCEDURE — 97530 THERAPEUTIC ACTIVITIES: CPT

## 2024-09-03 PROCEDURE — 97112 NEUROMUSCULAR REEDUCATION: CPT

## 2024-09-03 NOTE — PROGRESS NOTES
"OT Daily Note     Today's date: 9/3/2024  Patient name: Yaya Rodriguez  : 1955  MRN: [id number]  Referring provider: Tracy Moore MD  Dx:   Encounter Diagnoses   Name Primary?    Vision changes Yes    Cognitive complaints          Start Time: 848  Stop Time: 928  Total time in clinic (min): 40 minutes    PLAN OF CARE START: 2024  PLAN OF CARE END: 10/16/2024  FREQUENCY: 2-3x per week for 8-12 weeks  PRECAUTIONS balance deficits     Subjective: \"I do well during, then I can't stand straight after\" - referring to cata ball exercise.     Objective: See treatment below.                NMR:  -Cata ball exercises completed in standing. Pt performed monocular first, then binocular. Pt reported a mild increase in dizziness following each set and required rest breaks. Progressed to ball bunting with good performance during the exercise, however increase in symptoms when terminating the exercise. Exercise focused on smooth pursuits and visual-vestibular integration.   -Completed rotator sequence in standing with 50 targets on speed 1 to work on saccades, pursuits, and motion tolerance. Pt reported better tolerance with this activity.   -Completed smooth pursuits on BITS on speed 4 for 2 minutes each direction with rest breaks in between to work on motion tolerance, smooth pursuits. No increase in symptoms reported with this exercise.     TA:   Completed 2x qbitz extreme puzzles to work on  skills, problem solving. Required increased time but competed independently follow set up and instruction from the therapist.     Assessment: Tolerated treatment well. Pt demonstrated unsteadiness while performing cata ball tasks. Pt demonstrating in session impaired recall, OM teaming/fusion, convergence insufficiency. Pt would benefit from continued OT services to address these deficits      Plan: Continued skilled OT per POC.        SHORT TERM GOALS    Pt will demo G recall of 75% of verbal/written " information utilizing memory strategy of choice for improved delayed memory   Pt will increase delayed recall being able to recall 3 items on RBANs in LTM   Pt will increase oculomotor control for improved saccades, con/divergent tasks for improved reading, board to table tasks with minimal increase in symptoms 4 weeks    LONG TERM GOALS:     Memory     Pt will demo G recall of 85% of verbal/written information utilizing memory strategy of choice for improved delayed memory   Pt will increase delayed recall being able to recall 5 items on RBANs in LTM   Pt will improve overall score on RBANs to within average ranges.   Pt will increase oculomotor control for improved saccades, con/divergent tasks for improved reading, board to table tasks with no increase in symptoms in 12 weeks
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
## 2024-09-04 ENCOUNTER — APPOINTMENT (OUTPATIENT)
Facility: CLINIC | Age: 69
End: 2024-09-04
Payer: MEDICARE

## 2024-09-04 ENCOUNTER — OFFICE VISIT (OUTPATIENT)
Facility: CLINIC | Age: 69
End: 2024-09-04
Payer: MEDICARE

## 2024-09-04 DIAGNOSIS — H53.9 VISION CHANGES: Primary | ICD-10-CM

## 2024-09-04 DIAGNOSIS — R41.9 COGNITIVE COMPLAINTS: ICD-10-CM

## 2024-09-04 PROCEDURE — 97530 THERAPEUTIC ACTIVITIES: CPT | Performed by: OCCUPATIONAL THERAPIST

## 2024-09-04 NOTE — PROGRESS NOTES
OT Daily Note     Today's date: 2024  Patient name: Yaya Rodriguez  : 1955  MRN: [id number]  Referring provider: Tracy Moore MD  Dx:   Encounter Diagnoses   Name Primary?    Vision changes Yes    Cognitive complaints          Start Time: 151  Stop Time: 1620  Total time in clinic (min): 65 minutes    PLAN OF CARE START: 2024  PLAN OF CARE END: 10/16/2024  FREQUENCY: 2-3x per week for 8-12 weeks  PRECAUTIONS balance deficits     Subjective: Pt reports that when he does things he enjoys, like riding his bike, working on cars, and doing drywall jobs, his symptoms do not interfere and he feels good.    Objective: See treatment below.  TA:    Week 1: Patient introduced to the topic of pain neuroscience education and that improving knowledge of how pain works promotes improved recovery and rehab. Current knowledge and understanding of patient on pain related topics was explored to create baseline. Patient educated on the concept of the nervous system as the bodies alarm system, and the role of nociception to warn body of danger. Peripheral nerve sensitization, hyperalgesia, and allodynia were explained using metaphors to promote deep learning. Patient encouraged to identify personal yellow flags, and explore/identify activity limitations as a result of nervous system hypersensitivity prior to next visit. In depth discussion regarding pt's specific yellow flags (sxs of depression including apathy, lack of engagement in meaningful activities). The definition of positive affect and effects on pain, sleep, physical function, and stress were discussed. Educated on participating in positive activities, rather than just reflecting on them, to promote symptom reduction, help decrease catastrophizing and improve mood. Examples of positive activities that are shown to be most beneficial in promoting happiness were provided. Education on internal and external motivators provided.     Homework: Participate  in at least 1 positive activity this week, record what he did and how he felt before and after. Create a measurable goal to achieve in the next 7 days that involves engaging in a preferred leisure activity or type of exercise.      TherEx:  -Stationary bike x5 minutes focusing on cardiovascular endurance and participation in a preferred type of exercise.       Assessment: Tolerated treatment well. Pt receptive to goal making and the idea that he needs to increase participation in meaningful activities as a way to manage his symptoms. Pt demonstrating in session impaired recall, OM teaming/fusion, convergence insufficiency. Pt would benefit from continued OT services to address these deficits      Plan: Continued skilled OT per POC.        SHORT TERM GOALS    Pt will demo G recall of 75% of verbal/written information utilizing memory strategy of choice for improved delayed memory   Pt will increase delayed recall being able to recall 3 items on RBANs in LTM   Pt will increase oculomotor control for improved saccades, con/divergent tasks for improved reading, board to table tasks with minimal increase in symptoms 4 weeks    LONG TERM GOALS:     Memory     Pt will demo G recall of 85% of verbal/written information utilizing memory strategy of choice for improved delayed memory   Pt will increase delayed recall being able to recall 5 items on RBANs in LTM   Pt will improve overall score on RBANs to within average ranges.   Pt will increase oculomotor control for improved saccades, con/divergent tasks for improved reading, board to table tasks with no increase in symptoms in 12 weeks

## 2024-09-09 ENCOUNTER — TELEPHONE (OUTPATIENT)
Age: 69
End: 2024-09-09

## 2024-09-09 NOTE — TELEPHONE ENCOUNTER
Received a call from [facility]. Office wanted to make Marilyn aware that the records she requested were faxed over at 8:39 am this morning. Routed message to JAMARI Sanders to make her aware of this.

## 2024-09-10 ENCOUNTER — OFFICE VISIT (OUTPATIENT)
Facility: CLINIC | Age: 69
End: 2024-09-10
Payer: MEDICARE

## 2024-09-10 DIAGNOSIS — R41.9 COGNITIVE COMPLAINTS: ICD-10-CM

## 2024-09-10 DIAGNOSIS — R26.89 IMBALANCE: Primary | ICD-10-CM

## 2024-09-10 DIAGNOSIS — R42 DIZZINESS: ICD-10-CM

## 2024-09-10 DIAGNOSIS — H53.9 VISION CHANGES: Primary | ICD-10-CM

## 2024-09-10 PROCEDURE — 97112 NEUROMUSCULAR REEDUCATION: CPT

## 2024-09-10 NOTE — PROGRESS NOTES
Daily Note     Today's date: 9/10/2024  Patient name: Yaya Rodriguez  : 1955  MRN: [id number]  Referring provider: Tracy Moore MD  Dx:   Encounter Diagnosis     ICD-10-CM    1. Imbalance  R26.89       2. Dizziness  R42             Subjective: Reports he feels tired today but not off. Reports he went on bike ride and made effort     Objective: See treatment diary below      NMR  - Static standing on inverted bosu 1 min   - Static standing on inverted bosu + HT 1 min x 3  - Static standing on inverted bosu + HN 1 min x 3  - Static standing on inverted bosu + playing memory game x 5 minutes   -  Bending down to pick color block off the ground from foam beam, trunk rotation to place them on table behind him with cognitive dual task x 5 minutes       Assessment: Patient tolerated PT session well today with continued focus on both cognitive and motor dual task. Progress non-complaint surface to inverted bosu today with increased instability with static standing with head movements. Excessive trunk lateral shifting in order to maintain balance. Improved stability and did not report feelings of disorientation with bending down activity indicating improved habituation. Plan to continue with balance activities with balance and dual task to reduce fall risk and maximize function.      Plan:  Continue with PLOC - progress as appropriate. Gaze stabilization/habituation/balance with cognitive/motor dual tasking + cervical stretching/strengthening.           Outcome Measures Initial Eval  2024        5xSTS 7.38 sec without UE support        TUG  - Regular  - Cognitive    8.38 sec  - 9.95 (subtracting 3 from 100, multiple errors)           10 meter 1.14         FGA         DGI Defer         mCTSIB  - FTEO (firm)  - FTEC (firm)  - FTEO (foam)  - FTEC (foam) - 30 sec  - 30 sec (+)  - 30 secs  - 30 sec (+)                                         Access Code: [email or web address]  URL: [email or web address]  Date: 08/07/2024  Prepared by: Francesca Arroyo-Jacqueline    Exercises  - Seated Cervical Sidebending Stretch  - 1 x daily - 7 x weekly - 3 sets - 30s hold  - Seated Levator Scapulae Stretch  - 1 x daily - 7 x weekly - 3 sets - 30s hold  - Standing Cervical Sidebending AROM  - 1 x daily - 7 x weekly - 3 sets - 10 reps  - Seated Cervical Retraction  - 1 x daily - 7 x weekly - 3 sets - 10 reps

## 2024-09-10 NOTE — PROGRESS NOTES
OT Daily Note     Today's date: 9/10/2024  Patient name: Yaya Rodriguez  : 1955  MRN: [id number]  Referring provider: Tracy Moore MD  Dx:   Encounter Diagnoses   Name Primary?    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    Objective: See treatment below.    NMR:   -Rotator sequence on BITS to work on motion tolerance, saccades, pursuits.   -Geoboard copy with direct copy and mirror image copying to work on VM skills,  skills, saccades.   -Marisela ball performed monocular then binocular for smooth pursuits.  -Ball bunting in standing to work on visual-vestibular integration, bilateral coordination. Pt reported difficulty with keeping the ball straight, he was observed to push more with his L UE, however reported it felt like his R arm was out further.   -Form fields chart performed on the BITS to work on peripheral awareness. Performed chart in sequential order 2x. Pt reported he needed to blink to refocus his eyes several times thru the exercise.   -Completed 2x IQ digits puzzles at window sill to work on saccades, convergence/divergence,  skills.    Assessment: Tolerated treatment well. Pt reported no increase in symptoms today following exercises. Impaired perception and bilateral coordination observed during ball bunting exercise. Pt demonstrating in session impaired recall, OM teaming/fusion, convergence insufficiency. Pt would benefit from continued OT services to address these deficits      Plan: Continued skilled OT per POC.        SHORT TERM GOALS    Pt will demo G recall of 75% of verbal/written  information utilizing memory strategy of choice for improved delayed memory   Pt will increase delayed recall being able to recall 3 items on RBANs in LTM   Pt will increase oculomotor control for improved saccades, con/divergent tasks for improved reading, board to table tasks with minimal increase in symptoms 4 weeks    LONG TERM GOALS:     Memory     Pt will demo G recall of 85% of verbal/written information utilizing memory strategy of choice for improved delayed memory   Pt will increase delayed recall being able to recall 5 items on RBANs in LTM   Pt will improve overall score on RBANs to within average ranges.   Pt will increase oculomotor control for improved saccades, con/divergent tasks for improved reading, board to table tasks with no increase in symptoms in 12 weeks

## 2024-09-11 ENCOUNTER — OFFICE VISIT (OUTPATIENT)
Facility: CLINIC | Age: 69
End: 2024-09-11
Payer: MEDICARE

## 2024-09-11 ENCOUNTER — APPOINTMENT (OUTPATIENT)
Facility: CLINIC | Age: 69
End: 2024-09-11
Payer: MEDICARE

## 2024-09-11 DIAGNOSIS — R26.89 IMBALANCE: Primary | ICD-10-CM

## 2024-09-11 DIAGNOSIS — R42 DIZZINESS: ICD-10-CM

## 2024-09-11 PROCEDURE — 97530 THERAPEUTIC ACTIVITIES: CPT | Performed by: PHYSICAL THERAPIST

## 2024-09-11 PROCEDURE — 97110 THERAPEUTIC EXERCISES: CPT | Performed by: PHYSICAL THERAPIST

## 2024-09-11 PROCEDURE — 97112 NEUROMUSCULAR REEDUCATION: CPT

## 2024-09-11 NOTE — PROGRESS NOTES
Daily Note     Today's date: 2024  Patient name: Yaya Rodriguez  : 1955  MRN: [id number]  Referring provider: Tracy Moore MD  Dx:   Encounter Diagnosis     ICD-10-CM    1. Imbalance  R26.89       2. Dizziness  R42               Subjective: Patient reports that he went on 2 bike rides this week, 1 for 5 miles and 1 for 7 miles.    Objective: See treatment diary below      Neuro PNE Exercises  Neuro Re-Ed:  Week 2: Patient educated regarding spreading pain symptoms, and that feeling pain in adjacent areas of the body does not indicate definite tissue injury. Hyperalgesia, immune responses and central sensitization topics were introduced using metaphors to promote deep learning.   Automatic negative thoughts and relationship to chronic pain.   Definition and examples of cognitive distortions, and education on how they perpetuate negative thought patterns were discussed.   Education on maladaptive coping strategies that often occur with chronic pain (ie. avoidance and compensating) was provided.   Guided imagery was introduced as a relaxation technique, and completed before exercising.    TherEx:  Week 2 - Generalized Strengthening and Cardio  Circuit 1  - Week 2 - Jumping  - Week 2 - Wall push ups  - Week 2 - Sled push w/ partner           Assessment: Patient tolerated PT session well today with continued education via PNE group as stated above. Performed cardio and strength based circuits today with good tolerance and ability to intermittently trial simultaneous foot jumping activity. Educated patient regarding the need for the body to work in homeostasis to avoid a super system and potential for increased chronic pain through physical activity of partner sled push with good verbal understanding. He will continue to benefit from skilled outpatient PT in order to maximize his function and his QoL.      Plan:  Continue with PLOC -  progress as appropriate. Gaze stabilization/habituation/balance with cognitive/motor dual tasking + cervical stretching/strengthening.           Outcome Measures Initial Eval  7/1/2024        5xSTS 7.38 sec without UE support        TUG  - Regular  - Cognitive    8.38 sec  - 9.95 (subtracting 3 from 100, multiple errors)           10 meter 1.14         FGA 23/30        DGI Defer         mCTSIB  - FTEO (firm)  - FTEC (firm)  - FTEO (foam)  - FTEC (foam) - 30 sec  - 30 sec (+)  - 30 secs  - 30 sec (+)                                        Access Code: [email or web address]  URL: [email or web address]  Date: 08/07/2024  Prepared by: Francesca Arroyo-Dakshauba    Exercises  - Seated Cervical Sidebending Stretch  - 1 x daily - 7 x weekly - 3 sets - 30s hold  - Seated Levator Scapulae Stretch  - 1 x daily - 7 x weekly - 3 sets - 30s hold  - Standing Cervical Sidebending AROM  - 1 x daily - 7 x weekly - 3 sets - 10 reps  - Seated Cervical Retraction  - 1 x daily - 7 x weekly - 3 sets - 10 reps

## 2024-09-17 ENCOUNTER — EVALUATION (OUTPATIENT)
Facility: CLINIC | Age: 69
End: 2024-09-17
Payer: MEDICARE

## 2024-09-17 ENCOUNTER — OFFICE VISIT (OUTPATIENT)
Facility: CLINIC | Age: 69
End: 2024-09-17
Payer: MEDICARE

## 2024-09-17 DIAGNOSIS — R41.9 COGNITIVE COMPLAINTS: ICD-10-CM

## 2024-09-17 DIAGNOSIS — H53.9 VISION CHANGES: Primary | ICD-10-CM

## 2024-09-17 DIAGNOSIS — R42 DIZZINESS: ICD-10-CM

## 2024-09-17 DIAGNOSIS — R26.89 IMBALANCE: Primary | ICD-10-CM

## 2024-09-17 PROCEDURE — 97112 NEUROMUSCULAR REEDUCATION: CPT

## 2024-09-17 PROCEDURE — 97530 THERAPEUTIC ACTIVITIES: CPT | Performed by: OCCUPATIONAL THERAPIST

## 2024-09-17 NOTE — PROGRESS NOTES
"                                                              Daily Note     Today's date: 2024  Patient name: Yaya Rodriguez  : 1955  MRN: [id number]  Referring provider: Tracy Moore MD  Dx:   Encounter Diagnosis     ICD-10-CM    1. Imbalance  R26.89       2. Dizziness  R42             Subjective: Patient reports no new changes, complaints, or falls      Objective: See treatment diary below      NMR  - Bending down to pick Bananagrams off the ground from foam , trunk rotation to place them on table behind him with cognitive dual task (spelling words found in a grocery store) x 10 minutes   - Static standing on inverted bosu + HT 1 min x 2  - Static standing on inverted bosu + HN 1 min x 2  - Side stepping on foam beam + tennis ball bounce + naming car makes/models x 2 minutes   - Static standing on inverted bosu + spot the difference x 2 minutes       Assessment: Patient tolerated PT session well today with continued focus on both cognitive and motor dual task. He demonstrated good tolerance to increased duration of dual tasking with occasional use of excessive hip strategy; patient did endorse \"a little disorientation\" following activities with return to baseline with brief rest. Plan to continue with balance activities with balance and dual task to reduce fall risk and maximize function.      Plan:  Continue with PLOC - progress as appropriate. Gaze stabilization/habituation/balance with cognitive/motor dual tasking + cervical stretching/strengthening.           Outcome Measures Initial Eval  2024        5xSTS 7.38 sec without UE support        TUG  - Regular  - Cognitive    8.38 sec  - 9.95 (subtracting 3 from 100, multiple errors)           10 meter 1.14         [facility]         DGI Defer         mCTSIB  - FTEO (firm)  - FTEC (firm)  - FTEO (foam)  - FTEC (foam) - 30 sec  - 30 sec (+)  - 30 secs  - 30 sec (+)                                        Access Code: [email or web address]  URL: " [email or web address]  Date: 08/07/2024  Prepared by: Francesca Arroyo-Jacqueline    Exercises  - Seated Cervical Sidebending Stretch  - 1 x daily - 7 x weekly - 3 sets - 30s hold  - Seated Levator Scapulae Stretch  - 1 x daily - 7 x weekly - 3 sets - 30s hold  - Standing Cervical Sidebending AROM  - 1 x daily - 7 x weekly - 3 sets - 10 reps  - Seated Cervical Retraction  - 1 x daily - 7 x weekly - 3 sets - 10 reps
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
## 2024-09-18 ENCOUNTER — APPOINTMENT (OUTPATIENT)
Facility: CLINIC | Age: 69
End: 2024-09-18
Payer: MEDICARE

## 2024-09-18 ENCOUNTER — OFFICE VISIT (OUTPATIENT)
Facility: CLINIC | Age: 69
End: 2024-09-18
Payer: MEDICARE

## 2024-09-18 DIAGNOSIS — R41.9 COGNITIVE COMPLAINTS: ICD-10-CM

## 2024-09-18 DIAGNOSIS — H53.9 VISION CHANGES: Primary | ICD-10-CM

## 2024-09-18 PROCEDURE — 97530 THERAPEUTIC ACTIVITIES: CPT | Performed by: OCCUPATIONAL THERAPIST

## 2024-09-18 PROCEDURE — 97110 THERAPEUTIC EXERCISES: CPT | Performed by: OCCUPATIONAL THERAPIST

## 2024-09-18 NOTE — PROGRESS NOTES
"OT Daily Note     Today's date: 2024  Patient name: Yaya Rodriguez  : 1955  MRN: [id number]  Referring provider: Tracy Moore MD  Dx:   Encounter Diagnoses   Name Primary?    Vision changes Yes    Cognitive complaints                     PLAN OF CARE START: 2024  PLAN OF CARE END: 10/16/2024  FREQUENCY: 2-3x per week for 8-12 weeks  PRECAUTIONS balance deficits     Subjective:     Objective: See treatment below.  Neuro PNE Exercises  Neuro Re-Ed:  Week 3: Patient was educated regarding endogenous mechanisms and strategies to increase the brain's production of chemicals which decrease pain, such as aerobic exercise and improved pain knowledge. The concepts of pacing, graded exposure, 'sore but safe' and 'hurt does not equal harm' were discussed.   ANTs and types of cognitive distortions were reviewed. Education provided on when ANTs/cognitive distortions are most likely to occur. Discussed how to recognize patterns in day to day life that can lead to cognitive distortions/ANTs in order to start working toward more productive thought processes.  Education on identifying and implementing positive coping statements as a way to combat ANTs was provided.     TherEx:  Week 3 - Generalized Strengthening and Balance    Week 3 - Step up onto foam beam w/ lat movements and dual task of alphabetical naming  - Standing on airex pad stepping over 8\" gely to tap pill ball with alternating feet  - [facility]one weaving forward/backward and negotiating around group members  - Cone weaving with ball toss/catch    Assessment: Tolerated treatment well. Receptive to content discussed and asked questions to improve understanding. Demonstrated impaired balance, with more difficulty on compliant surface. Pt would benefit from continued OT services to address these deficits      Plan: Continued skilled OT per POC.        SHORT TERM GOALS    Pt will demo G recall of 75% of verbal/written information utilizing memory " strategy of choice for improved delayed memory   Pt will increase delayed recall being able to recall 3 items on RBANs in LTM   Pt will increase oculomotor control for improved saccades, con/divergent tasks for improved reading, board to table tasks with minimal increase in symptoms 4 weeks    LONG TERM GOALS:     Memory     Pt will demo G recall of 85% of verbal/written information utilizing memory strategy of choice for improved delayed memory   Pt will increase delayed recall being able to recall 5 items on RBANs in LTM   Pt will improve overall score on RBANs to within average ranges.   Pt will increase oculomotor control for improved saccades, con/divergent tasks for improved reading, board to table tasks with no increase in symptoms in 12 weeks

## 2024-09-24 ENCOUNTER — OFFICE VISIT (OUTPATIENT)
Facility: CLINIC | Age: 69
End: 2024-09-24
Payer: MEDICARE

## 2024-09-24 DIAGNOSIS — R42 DIZZINESS: ICD-10-CM

## 2024-09-24 DIAGNOSIS — R26.89 IMBALANCE: Primary | ICD-10-CM

## 2024-09-24 DIAGNOSIS — R41.9 COGNITIVE COMPLAINTS: ICD-10-CM

## 2024-09-24 DIAGNOSIS — H53.9 VISION CHANGES: Primary | ICD-10-CM

## 2024-09-24 PROCEDURE — 97112 NEUROMUSCULAR REEDUCATION: CPT

## 2024-09-24 NOTE — PROGRESS NOTES
OT Daily Note     Today's date: 2024  Patient name: Yaya Rodriguez  : 1955  MRN: [id number]  Referring provider: Tracy Moore MD  Dx:   Encounter Diagnoses   Name Primary?    Vision changes Yes    Cognitive complaints      Start Time: 845  Stop Time: 925  Total time in clinic (min): 40 minutes    PLAN OF CARE START: 2024  PLAN OF CARE END: 10/16/2024  FREQUENCY: 2-3x per week for 8-12 weeks  PRECAUTIONS balance deficits     Subjective: Pt states he feels like he is progressing with the group he started.     Objective: See treatment below.    NMR:  -Completed complex array on BITS with alternating numbers and letters. Pt turning between each time to switch between the two activities. Focused on alternating attention, dynamic standing balance, working memory, visual-vestibular integration.   -Completed bean bag toss activity with the patient turning 180 degrees each repetition to grab a bean bag, then tossing toward board to work on hand eye coordination, visual-vestibular integration, dynamic standing balance.   -Completed 2x pixie cubes puzzles in standing set up to work on convergence/divergence, accomodation,  skills, EF and problem solving. One cues provided to assist with more complex puzzle.     Assessment: Tolerated treatment well. No complaints throughout session. Continues to demonstrate deficits with OM skills, cognition, and visual vestibular integration. Pt would benefit from continued OT services to address these deficits    Plan: Continued skilled OT per POC.    SHORT TERM GOALS    Pt will demo G recall of 75% of verbal/written information utilizing memory strategy of choice for improved delayed memory   Pt will increase delayed recall being able to recall 3 items on RBANs in LTM   Pt will increase oculomotor control for improved saccades, con/divergent tasks for improved reading, board to table tasks with minimal increase in symptoms 4 weeks    LONG TERM GOALS:     Memory      Pt will demo G recall of 85% of verbal/written information utilizing memory strategy of choice for improved delayed memory   Pt will increase delayed recall being able to recall 5 items on RBANs in LTM   Pt will improve overall score on RBANs to within average ranges.   Pt will increase oculomotor control for improved saccades, con/divergent tasks for improved reading, board to table tasks with no increase in symptoms in 12 weeks

## 2024-09-24 NOTE — PROGRESS NOTES
Daily Note     Today's date: 2024  Patient name: Yaya Rodriguez  : 1955  MRN: [id number]  Referring provider: Tracy Moore MD  Dx:   Encounter Diagnosis     ICD-10-CM    1. Imbalance  R26.89       2. Dizziness  R42             Subjective: Patient reports no new changes, complaints, or falls      Objective: See treatment diary below      TE:  10 minutes recumbent bike - hill mode, seat 9     NMR  - Resisted step up to large RR (red bungee) x 2 minutes   - added HT x 2 minutes   - added HN x 2 minutes   - Walking across large/medium RR + balance pods x 2 minutes  - Walking across large/medium RR + balance pods    - moving rubber bands, top of can to the bottom x 2 minutes  - 180 degree turns on BB x 2 minutes          Assessment: Patient tolerated PT session well today with continued focus on both cognitive and motor dual task. Added the recumbent bike to increase salience and encourage returning back to recreational activity such as biking. Continues to demonstrate excessive A-P swaying with occasional squatting reactive balance strategy on non-complaint surfaces. Plan to continue with balance activities with balance and dual task to reduce fall risk and maximize function.      Plan:  Continue with PLOC - progress as appropriate. Gaze stabilization/habituation/balance with cognitive/motor dual tasking + cervical stretching/strengthening.           Outcome Measures Initial Eval  2024        5xSTS 7.38 sec without UE support        TUG  - Regular  - Cognitive    8.38 sec  - 9.95 (subtracting 3 from 100, multiple errors)           10 meter 1.14         FGA         DGI Defer         mCTSIB  - FTEO (firm)  - FTEC (firm)  - FTEO (foam)  - FTEC (foam) - 30 sec  - 30 sec (+)  - 30 secs  - 30 sec (+)                                        Access Code: [email or web address]  URL: [email or web address]  Date: 2024  Prepared by:  Francesca Arroyo-Jacqueline    Exercises  - Seated Cervical Sidebending Stretch  - 1 x daily - 7 x weekly - 3 sets - 30s hold  - Seated Levator Scapulae Stretch  - 1 x daily - 7 x weekly - 3 sets - 30s hold  - Standing Cervical Sidebending AROM  - 1 x daily - 7 x weekly - 3 sets - 10 reps  - Seated Cervical Retraction  - 1 x daily - 7 x weekly - 3 sets - 10 reps

## 2024-09-25 ENCOUNTER — OFFICE VISIT (OUTPATIENT)
Facility: CLINIC | Age: 69
End: 2024-09-25
Payer: MEDICARE

## 2024-09-25 ENCOUNTER — APPOINTMENT (OUTPATIENT)
Facility: CLINIC | Age: 69
End: 2024-09-25
Payer: MEDICARE

## 2024-09-25 DIAGNOSIS — R41.9 COGNITIVE COMPLAINTS: ICD-10-CM

## 2024-09-25 DIAGNOSIS — H53.9 VISION CHANGES: Primary | ICD-10-CM

## 2024-09-25 PROCEDURE — 97110 THERAPEUTIC EXERCISES: CPT | Performed by: OCCUPATIONAL THERAPIST

## 2024-09-25 PROCEDURE — 97530 THERAPEUTIC ACTIVITIES: CPT | Performed by: OCCUPATIONAL THERAPIST

## 2024-09-25 NOTE — PROGRESS NOTES
"OT Daily Note     Today's date: 2024  Patient name: Yaya Rodriguez  : 1955  MRN: [id number]  Referring provider: Tracy Moore MD  Dx:   Encounter Diagnoses   Name Primary?    Vision changes Yes    Cognitive complaints          Start Time: 1540  Stop Time: 1630  Total time in clinic (min): 50 minutes    PLAN OF CARE START: 2024  PLAN OF CARE END: 10/16/2024  FREQUENCY: 2-3x per week for 8-12 weeks  PRECAUTIONS balance deficits     Subjective: Pt stated he doesn't have stress    Objective: See treatment below.  Neuro PNE Exercises  Neuro Re-Ed:  Neuro PNE Exercises  Neuro Re-Ed:  Week 4: Patient educated on the pain neuromatrix or pain map via fMRI examples, and how various areas of the brain are involved in pain experience. These areas have alternative primary focus which is disturbed when the brain is producing pain (memory, focus, concentration, emotion, fine motor control, temperature), causing potential patient issues/struggles. Patient educated on importance of graded introduction to activity, including review of \"hurt does not equal harm\" when discussing progression of aerobic and functional activity progressions, pt expressed good understanding. Patient encouraged to journal body issues experienced as a result of pain, and problem solve strategies discussed at today's session which can improve \"pain maps\".   Briefly reviewed positive affect and effects on stress and pain.  Education on the definition of negativity bias and effect on chronic pain, as well as the 3:1 positivity ratio and how positivity improves overall well-being was provided. The concept of mindfulness and acknowledging feelings/bodily sensations without judgment was introduced, in order to promote productive reactions to pain and improved QOL long-term.   How pain and stress are processed in the brain, and how higher levels of stress result in higher perceived pain were discussed.   Body scanning was introduced as a " "mindfulness and self-regulation technique.    TherEx:  Week 4 - Generalized Strengthening and Cognition  - Week 4 - Random Blaze Pod (2 high/2 low) tapping w/ UE/LE and R/L per associated color  - Week 4 - [place] press while naming cities [place]  - Sidestepping and naming \"anything but\" color of cone  - Week 4 - Amb w/ med ball naming foods that start w/ the last letter of the word prior      Assessment: Tolerated treatment well. Asked appropriate questions for clarification.  Demonstrated impaired balance, with more difficulty on compliant surface. Pt would benefit from continued OT services to address these deficits      Plan: Continued skilled OT per POC.        SHORT TERM GOALS    Pt will demo G recall of 75% of verbal/written information utilizing memory strategy of choice for improved delayed memory   Pt will increase delayed recall being able to recall 3 items on RBANs in LTM   Pt will increase oculomotor control for improved saccades, con/divergent tasks for improved reading, board to table tasks with minimal increase in symptoms 4 weeks    LONG TERM GOALS:     Memory     Pt will demo G recall of 85% of verbal/written information utilizing memory strategy of choice for improved delayed memory   Pt will increase delayed recall being able to recall 5 items on RBANs in LTM   Pt will improve overall score on RBANs to within average ranges.   Pt will increase oculomotor control for improved saccades, con/divergent tasks for improved reading, board to table tasks with no increase in symptoms in 12 weeks    "

## 2024-10-01 ENCOUNTER — OFFICE VISIT (OUTPATIENT)
Facility: CLINIC | Age: 69
End: 2024-10-01
Payer: MEDICARE

## 2024-10-01 DIAGNOSIS — H53.9 VISION CHANGES: Primary | ICD-10-CM

## 2024-10-01 DIAGNOSIS — R26.89 IMBALANCE: Primary | ICD-10-CM

## 2024-10-01 DIAGNOSIS — R41.9 COGNITIVE COMPLAINTS: ICD-10-CM

## 2024-10-01 DIAGNOSIS — R42 DIZZINESS: ICD-10-CM

## 2024-10-01 PROCEDURE — 97530 THERAPEUTIC ACTIVITIES: CPT | Performed by: OCCUPATIONAL THERAPIST

## 2024-10-01 PROCEDURE — 97112 NEUROMUSCULAR REEDUCATION: CPT | Performed by: OCCUPATIONAL THERAPIST

## 2024-10-01 PROCEDURE — 97110 THERAPEUTIC EXERCISES: CPT

## 2024-10-01 PROCEDURE — 97530 THERAPEUTIC ACTIVITIES: CPT

## 2024-10-01 NOTE — PROGRESS NOTES
"                                                              Daily Note     Today's date: 10/1/2024  Patient name: Yaya Rodriguez  : 1955  MRN: [id number]  Referring provider: Tracy Moore MD  Dx:   Encounter Diagnosis     ICD-10-CM    1. Imbalance  R26.89       2. Dizziness  R42             Subjective: Patient reports no new changes, complaints, or falls  Reports he went bike riding over the weekend. Plans to join the gym once the weather gets cold    Objective: See treatment diary below      TE:  10 minutes recumbent bike - hill mode, level 6, seat 9     NMR  - STS with feet on inverted foam + holding 10# TT x 2 minutes  - STS with feet on inverted foam + eyes closed x 1 minute  - Standing on foam pad + trunk rotation with 10# TT x 2 minutes  - Standing on foam pad + trunk rotation with eyes closed + #10TT x 2 minutes   -Regressed to opening eyes at center   -  Forward/backward step over 9\" gely from/to foam x 2 minutes   - Lateral stepping over 9\" gely from/to foam x 2 minutes  - Eyes closed on medium RR +  1 minute  - Eyes closed + HN on medium RR x 1 minute  - Eyes closed + HT on medium RR x 1 minute           Assessment: Patient tolerated PT session well today with continued focus on both cognitive and motor dual task. Challenged with eyes closed on non-complaint surface with added trunk rotation with excessive A-P swaying likely due to heavy reliance on visual input to maintain his balance. Plan to continue to add eyes closed on non-complaint surface to strengthening the other visual systems. Plan to continue with balance activities with balance and dual task to reduce fall risk and maximize function.      Plan:  Continue with PLOC - progress as appropriate. Gaze stabilization/habituation/balance with cognitive/motor dual tasking + cervical stretching/strengthening.           Outcome Measures Initial Eval  2024        5xSTS 7.38 sec without UE support        TUG  - Regular  - Cognitive "    8.38 sec  - 9.95 (subtracting 3 from 100, multiple errors)           10 meter 1.14         A 23/30        DGI Defer         mCTSIB  - FTEO (firm)  - FTEC (firm)  - FTEO (foam)  - FTEC (foam) - 30 sec  - 30 sec (+)  - 30 secs  - 30 sec (+)                                        Access Code: [email or web address]  URL: [email or web address]  Date: 08/07/2024  Prepared by: Francesca Arroyo-Dakshauba    Exercises  - Seated Cervical Sidebending Stretch  - 1 x daily - 7 x weekly - 3 sets - 30s hold  - Seated Levator Scapulae Stretch  - 1 x daily - 7 x weekly - 3 sets - 30s hold  - Standing Cervical Sidebending AROM  - 1 x daily - 7 x weekly - 3 sets - 10 reps  - Seated Cervical Retraction  - 1 x daily - 7 x weekly - 3 sets - 10 reps

## 2024-10-01 NOTE — PROGRESS NOTES
"OT Daily Note     Today's date: 10/1/2024  Patient name: Yaya Rodriguez  : 1955  MRN: [id number]  Referring provider: Tracy Moore MD  Dx:   Encounter Diagnoses   Name Primary?    Vision changes Yes    Cognitive complaints      Start Time: 0850  Stop Time: 0948  Total time in clinic (min): 58 minutes    PLAN OF CARE START: 2024  PLAN OF CARE END: 10/16/2024  FREQUENCY: 2-3x per week for 8-12 weeks  PRECAUTIONS balance deficits     Subjective: \"Make me better\"    Objective: See treatment below.    NMR:  -Pencils push-ups with Aaron chart while sitting focusing on OM control for convergence/accommodation. Then completed standing, and standing on airex pad to incorporate static balance. Min postural sway while standing, no LOB.  -Letter/number grids placed on separate tables with foam beam between tables. Pt walked back and forth on beam to cover letters and numbers in reverse order focusing on visual-vestibular integration, dynamic balance, working memory, divided attention. Pt reported level of challenge -8/10.   -Bruno while walking focusing on visual-vestibular integration and working memory.  -First/last letter decoding worksheet focusing on OM control for saccades, convergence, sustained attention.    TA:  -Reading with straight edge under line being read trialed as compensatory strategy to improve ability to perform IADLs. Pt reported mild improvement in reading ability with this technique.        Assessment: Tolerated treatment well. Continues to demonstrate deficits with OM skills, cognition, and visual vestibular integration. Pt would benefit from continued OT services to address these deficits    Plan: Continued skilled OT per POC.    SHORT TERM GOALS    Pt will demo G recall of 75% of verbal/written information utilizing memory strategy of choice for improved delayed memory   Pt will increase delayed recall being able to recall 3 items on RBANs in LTM   Pt will increase oculomotor " control for improved saccades, con/divergent tasks for improved reading, board to table tasks with minimal increase in symptoms 4 weeks    LONG TERM GOALS:     Memory     Pt will demo G recall of 85% of verbal/written information utilizing memory strategy of choice for improved delayed memory   Pt will increase delayed recall being able to recall 5 items on RBANs in LTM   Pt will improve overall score on RBANs to within average ranges.   Pt will increase oculomotor control for improved saccades, con/divergent tasks for improved reading, board to table tasks with no increase in symptoms in 12 weeks

## 2024-10-02 ENCOUNTER — APPOINTMENT (OUTPATIENT)
Facility: CLINIC | Age: 69
End: 2024-10-02
Payer: MEDICARE

## 2024-10-02 ENCOUNTER — OFFICE VISIT (OUTPATIENT)
Facility: CLINIC | Age: 69
End: 2024-10-02
Payer: MEDICARE

## 2024-10-02 DIAGNOSIS — R41.9 COGNITIVE COMPLAINTS: ICD-10-CM

## 2024-10-02 DIAGNOSIS — H53.9 VISION CHANGES: Primary | ICD-10-CM

## 2024-10-02 PROCEDURE — 97110 THERAPEUTIC EXERCISES: CPT | Performed by: OCCUPATIONAL THERAPIST

## 2024-10-02 PROCEDURE — 97530 THERAPEUTIC ACTIVITIES: CPT | Performed by: OCCUPATIONAL THERAPIST

## 2024-10-04 NOTE — PROGRESS NOTES
OT Daily Note     Today's date: 10/3/2024  Patient name: Yaya Rodriguez  : 1955  MRN: [id number]  Referring provider: Tracy Moore MD  Dx:   Encounter Diagnoses   Name Primary?    Vision changes Yes    Cognitive complaints                     PLAN OF CARE START: 2024  PLAN OF CARE END: 10/16/2024  FREQUENCY: 2-3x per week for 8-12 weeks  PRECAUTIONS balance deficits     Subjective: No changes reported since last session    Objective: See treatment below.  TA:  Week 5: - Patient was educated regarding sympathetic nervous system topics as they pertain to pain, including stress biology, fight or flight response, the role of adrenaline and cortisol in pain, the body's immune response and multiple output mechanisms. Metaphors were used to promote deep learning, and the role of self-care techniques useful in claming the sympathetic nervous system were addressed.  A brief review of definitions and benefits of positive affect and mindfulness for chronic pain were given.   Education provided on the effects of mindfulness-based strategies for sleep disturbances.   Education on and guided practice of progressive muscle relaxation was completed.   Education on the benefits of progressive muscle relaxation for chronic pain and stress management was provided.    TherEx:  Week 5 - Generalized Strengthening and Fine Motor  Circuit 1  - Week 5 - Bananagram letters on floor and making word on elevated surface  - Week 5 - Wall sit passing pegs down the line and placing in peg board    Circuit 2  - Week 5 - Ambulation w/ rotating pinch  on dots  - Week 5 - [place] twists w/ picking up/placing tape on either side        Assessment: Tolerated treatment well. Seemed receptive to trying PMR at home. Continues to demonstrate signs of impaired visual-vestibular integration. Pt would benefit from continued OT services to address these deficits      Plan: Continued skilled OT per POC.        SHORT TERM GOALS    Pt will  demo G recall of 75% of verbal/written information utilizing memory strategy of choice for improved delayed memory   Pt will increase delayed recall being able to recall 3 items on RBANs in LTM   Pt will increase oculomotor control for improved saccades, con/divergent tasks for improved reading, board to table tasks with minimal increase in symptoms 4 weeks    LONG TERM GOALS:     Memory     Pt will demo G recall of 85% of verbal/written information utilizing memory strategy of choice for improved delayed memory   Pt will increase delayed recall being able to recall 5 items on RBANs in LTM   Pt will improve overall score on RBANs to within average ranges.   Pt will increase oculomotor control for improved saccades, con/divergent tasks for improved reading, board to table tasks with no increase in symptoms in 12 weeks

## 2024-10-08 ENCOUNTER — OFFICE VISIT (OUTPATIENT)
Facility: CLINIC | Age: 69
End: 2024-10-08
Payer: MEDICARE

## 2024-10-08 DIAGNOSIS — R41.9 COGNITIVE COMPLAINTS: ICD-10-CM

## 2024-10-08 DIAGNOSIS — R26.89 IMBALANCE: Primary | ICD-10-CM

## 2024-10-08 DIAGNOSIS — H53.9 VISION CHANGES: Primary | ICD-10-CM

## 2024-10-08 DIAGNOSIS — R42 DIZZINESS: ICD-10-CM

## 2024-10-08 PROCEDURE — 97530 THERAPEUTIC ACTIVITIES: CPT

## 2024-10-08 PROCEDURE — 97530 THERAPEUTIC ACTIVITIES: CPT | Performed by: OCCUPATIONAL THERAPIST

## 2024-10-08 NOTE — PROGRESS NOTES
"OT Daily Note     Today's date: 10/8/2024  Patient name: Yaya Rodriguez  : 1955  MRN: [id number]  Referring provider: Tracy Moore MD  Dx:   Encounter Diagnoses   Name Primary?    Vision changes Yes    Cognitive complaints        Start Time: 855  Stop Time: 933  Total time in clinic (min): 38 minutes    PLAN OF CARE START: 2024  PLAN OF CARE END: 10/16/2024  FREQUENCY: 2-3x per week for 8-12 weeks  PRECAUTIONS balance deficits     Subjective: \"I haven't opened my mail in years because I don't care. There's a check for $83K somewhere in my house but I don't know where it is.\"    Objective: See treatment below.  Prior to session discussed pt's progress with POC with primary PT.     -Lengthy discussion regarding pt's goals and goals in POC, subjective progress with therapy 2/2 pt reporting poor carryover with HEP to PT. During our conversation pt reported that he completes Frederic string exercises at least 1x/day, but he could be doing more. He has started riding his bike again 2x/wk since it was suggested during PNE group. He acknowledges that he thinks he's depressed but doesn't want to discuss this with any of his doctors or take medication to address it. We discussed sxs of depression including apathy, loss of motivation, cognitive changes and that depression can go away with appropriate tx.   -Recommended starting to create daily habits/routines to improve QOL, since pt is not working, starting with opening his mail every day. He was agreeable.   -Initiated COPM, however unable to complete 2/2 time constraints. Plan to complete remainder during next 1:1 session.      Assessment: Session focused on reviewing pt's goals, goals in POC, progress to date, and addressing sxs of depression. Pt was open to working toward another new goal- opening his mail daily.   Pt continues to demonstrate deficits with OM skills, cognition, and visual vestibular integration. Pt would benefit from continued OT " services to address these deficits    Plan: Continued skilled OT per POC.    SHORT TERM GOALS    Pt will demo G recall of 75% of verbal/written information utilizing memory strategy of choice for improved delayed memory   Pt will increase delayed recall being able to recall 3 items on RBANs in LTM   Pt will increase oculomotor control for improved saccades, con/divergent tasks for improved reading, board to table tasks with minimal increase in symptoms 4 weeks    LONG TERM GOALS:     Memory     Pt will demo G recall of 85% of verbal/written information utilizing memory strategy of choice for improved delayed memory   Pt will increase delayed recall being able to recall 5 items on RBANs in LTM   Pt will improve overall score on RBANs to within average ranges.   Pt will increase oculomotor control for improved saccades, con/divergent tasks for improved reading, board to table tasks with no increase in symptoms in 12 weeks
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
## 2024-10-09 ENCOUNTER — OFFICE VISIT (OUTPATIENT)
Facility: CLINIC | Age: 69
End: 2024-10-09
Payer: MEDICARE

## 2024-10-09 DIAGNOSIS — R41.9 COGNITIVE COMPLAINTS: ICD-10-CM

## 2024-10-09 DIAGNOSIS — H53.9 VISION CHANGES: Primary | ICD-10-CM

## 2024-10-09 PROCEDURE — 97110 THERAPEUTIC EXERCISES: CPT | Performed by: OCCUPATIONAL THERAPIST

## 2024-10-09 PROCEDURE — 97530 THERAPEUTIC ACTIVITIES: CPT | Performed by: OCCUPATIONAL THERAPIST

## 2024-10-09 NOTE — PROGRESS NOTES
OT Daily Note     Today's date: 10/9/2024  Patient name: Yaya Rodriguez  : 1955  MRN: [id number]  Referring provider: Tracy Moore MD  Dx:   Encounter Diagnoses   Name Primary?    Vision changes Yes    Cognitive complaints                     PLAN OF CARE START: 2024  PLAN OF CARE END: 10/16/2024  FREQUENCY: 2-3x per week for 8-12 weeks  PRECAUTIONS balance deficits     Subjective: No changes reported since last session    Objective: See treatment below.  Neuro PNE Exercises  TA:  Week 6: Patient educated about the relationship between tissue injury and pain, and that pain is an output by the brain. Included was the principle that tissue injury does not have to hurt, and that you can have pain without having tissue injury, particularly with  a hypersensitive nervous system. Patient educated regarding neurogenic inflammation concepts, including retrograde firing, persistent swelling, and immune response. Thoughts are also nerve impulses, thus emotional stress and worry/fear can keep tissues inflamed. Discussed topics of mindfulness and selective coping for stress modulation; pt expressed good understanding. Education on the effectiveness of graded activity and graded exposure when increasing tolerance for tasks with chronic pain present was provided. Pt educated on the benefits of aerobic activity for overall health and chronic pain management. Normal tissue healing time was reviewed.    TherEx:  Week 6 - Generalized Strengthening and Agility  Circuit 1  - Week 6 - Partner resisted fwd/bwd activity in agility ladder  - Week 6 - In/in/out/out over hurdles  - Week 6 - Wall sit to run and tap associated pod in front and back to wall    Circuit 2  - Week 6 - Lat plank walks in UE and LE agility ladder  - Week 6 - Speed walking with called out random directional changes    Assessment: Tolerated treatment well. Notable symptoms of visual-vestibular integration dysfunction during speed walking with  directional turns and navigating in crowded environment. Pt would benefit from continued OT services to address these deficits      Plan: Continued skilled OT per POC.        SHORT TERM GOALS    Pt will demo G recall of 75% of verbal/written information utilizing memory strategy of choice for improved delayed memory   Pt will increase delayed recall being able to recall 3 items on RBANs in LTM   Pt will increase oculomotor control for improved saccades, con/divergent tasks for improved reading, board to table tasks with minimal increase in symptoms 4 weeks    LONG TERM GOALS:     Memory     Pt will demo G recall of 85% of verbal/written information utilizing memory strategy of choice for improved delayed memory   Pt will increase delayed recall being able to recall 5 items on RBANs in LTM   Pt will improve overall score on RBANs to within average ranges.   Pt will increase oculomotor control for improved saccades, con/divergent tasks for improved reading, board to table tasks with no increase in symptoms in 12 weeks

## 2024-10-15 ENCOUNTER — OFFICE VISIT (OUTPATIENT)
Facility: CLINIC | Age: 69
End: 2024-10-15
Payer: MEDICARE

## 2024-10-15 DIAGNOSIS — H53.9 VISION CHANGES: Primary | ICD-10-CM

## 2024-10-15 DIAGNOSIS — R26.89 IMBALANCE: Primary | ICD-10-CM

## 2024-10-15 DIAGNOSIS — R41.9 COGNITIVE COMPLAINTS: ICD-10-CM

## 2024-10-15 DIAGNOSIS — R42 DIZZINESS: ICD-10-CM

## 2024-10-15 PROCEDURE — 97530 THERAPEUTIC ACTIVITIES: CPT

## 2024-10-15 NOTE — PROGRESS NOTES
Daily Note     Today's date: 10/15/2024  Patient name: Yaya Rodriguez  : 1955  MRN: [id number]  Referring provider: Tracy Moore MD  Dx:   Encounter Diagnosis     ICD-10-CM    1. Imbalance  R26.89       2. Dizziness  R42             Subjective: Patient reports no new changes, complaints, or falls. Pt reports he is considering joining the gym.     Objective: See treatment diary below      TA:  Finished outcome measures: FSTS, TUG, FGA, mCTSIB    NMR:   X2 minutes blaze pods to force cervical extension x 2 minutes (twsice)   1st set: 39 hits   2nd set: 51 hits    Assessment: Patient tolerated PT session well today with completing the remaining outcome measures. Pt continues to make improvement in all outcome measures. He demonstrated decreased swaying on the mCTSIB indicating improved vestibular integration. He also increased his FGA score by 3 points and only demonstrated instability with walking with his eyes closed. Pt continues to report mild instability on uneven surfaces and looking upward. Plan to focus up to four more weeks on higher level activities with an focus with looking upward and non-complaint surfaces. Plan to continue with balance activities with balance and dual task to reduce fall risk and maximize function.      Plan:  Continue with PLOC - progress as appropriate. Gaze stabilization/habituation/balance with cognitive/motor dual tasking + cervical stretching/strengthening.           Outcome Measures Initial Eval  2024 2024 9/3/24 10/8/24 10/15/24    5xSTS 7.38 sec without UE support 6.95 sec without AD   5.95 sec without AD Defer to NV due to time constraint 5.78 seconds without UE support    TUG  - Regular  - Cognitive    8.38 sec  - 9.95 (subtracting 3 from 100, multiple errors)    7.25 sec    7.15 (Subtracting 3 from 100, multiple errors)   7.33 second     5.83 seconds (subtracting 3, multiple error) Defer to NV  due to time constraint   6.93 seconds       6.82 seconds (counting backwards from 3)    10 meter 1.14  1,33 m/s  1.34 m/s  Defer to NV due to time constraint NT    FGA 23/30 26/30 26/30 Defer to NV due to time constraint 29/30    DGI Defer         mCTSIB  - FTEO (firm)  - FTEC (firm)  - FTEO (foam)  - FTEC (foam) - 30 sec  - 30 sec (+)  - 30 secs  - 30 sec (+) - 30 sec  - 30 sec (+)  - 30 sec  - 30 sec (+)  - 30 sec  - 30 sec (+)  - 30 sec  - 30 sec (+) Defer to NV due to time constraint - 30s  - 30s  - 30s  - 30s     I  54/100 64/100 64/100                          Access Code: [email or web address]  URL: [email or web address]  Date: 08/07/2024  Prepared by: Francesca Arroyo-Matsuba    Exercises  - Seated Cervical Sidebending Stretch  - 1 x daily - 7 x weekly - 3 sets - 30s hold  - Seated Levator Scapulae Stretch  - 1 x daily - 7 x weekly - 3 sets - 30s hold  - Standing Cervical Sidebending AROM  - 1 x daily - 7 x weekly - 3 sets - 10 reps  - Seated Cervical Retraction  - 1 x daily - 7 x weekly - 3 sets - 10 reps

## 2024-10-15 NOTE — PROGRESS NOTES
OT Daily Note     Today's date: 10/15/2024  Patient name: Yaya Rodriguez  : 1955  MRN: [id number]  Referring provider: Tracy Moore MD  Dx:   Encounter Diagnoses   Name Primary?    Vision changes Yes    Cognitive complaints      Start Time: 848  Stop Time: 930  Total time in clinic (min): 42 minutes    PLAN OF CARE START: 2024  PLAN OF CARE END: 10/16/2024  FREQUENCY: 2-3x per week for 8-12 weeks  PRECAUTIONS balance deficits     Subjective: Pt states he does not enjoy riding as much when he is put on a schedule of how long and when to ride.     Objective: See treatment below.    Performed COPM today to finish from prior session.   OPP Importance Performance Satisfaction   1. Mountain Biking 10 5 7   2. Riding Bike Socially 5 5 5   3. Working on Cars 8 8 9   4. Managing Finances 2 1 1     Therapist had a lengthy discussion today taking the full duration of today's appointment, there the therapist and the pt discussed occupational performance problems thru the COPM, finding areas that are the most important to the patient. Encouraged the pt to engage in social participation events thru biking, which he was partially receptive to, however hesitant due to fear of holding others back. Discussed intrinsic vs extrinsic motivation, pt stating he did not do well with outside factors for motivation.     Assessment: Tolerated treatment well. Pt symptoms continue to limit occupational performance in various areas, both in leisure and IADL. Pt continues to struggle with motivation to perform these occupations.  Pt would benefit from continued OT services to address these deficits      Plan: Continued skilled OT per POC.        SHORT TERM GOALS    Pt will demo G recall of 75% of verbal/written information utilizing memory strategy of choice for improved delayed memory   Pt will increase delayed recall being able to recall 3 items on RBANs in LTM   Pt will increase oculomotor control for improved saccades,  con/divergent tasks for improved reading, board to table tasks with minimal increase in symptoms 4 weeks    LONG TERM GOALS:     Memory     Pt will demo G recall of 85% of verbal/written information utilizing memory strategy of choice for improved delayed memory   Pt will increase delayed recall being able to recall 5 items on RBANs in LTM   Pt will improve overall score on RBANs to within average ranges.   Pt will increase oculomotor control for improved saccades, con/divergent tasks for improved reading, board to table tasks with no increase in symptoms in 12 weeks

## 2024-10-16 ENCOUNTER — OFFICE VISIT (OUTPATIENT)
Facility: CLINIC | Age: 69
End: 2024-10-16
Payer: MEDICARE

## 2024-10-16 DIAGNOSIS — H53.9 VISION CHANGES: Primary | ICD-10-CM

## 2024-10-16 DIAGNOSIS — R41.9 COGNITIVE COMPLAINTS: ICD-10-CM

## 2024-10-16 PROCEDURE — 97530 THERAPEUTIC ACTIVITIES: CPT | Performed by: OCCUPATIONAL THERAPIST

## 2024-10-16 PROCEDURE — 97110 THERAPEUTIC EXERCISES: CPT | Performed by: OCCUPATIONAL THERAPIST

## 2024-10-17 NOTE — PROGRESS NOTES
OT Daily Note     Today's date: 10/16/2024  Patient name: Yaya Rodriguez  : 1955  MRN: [id number]  Referring provider: Tracy Moore MD  Dx:   Encounter Diagnoses   Name Primary?    Vision changes Yes    Cognitive complaints          Start Time: 1508  Stop Time: 1630  Total time in clinic (min): 82 minutes    PLAN OF CARE START: 2024  PLAN OF CARE END: 10/16/2024  FREQUENCY: 2-3x per week for 8-12 weeks  PRECAUTIONS balance deficits     Subjective: No changes reported since last session    Objective: See treatment below.  Pt participated in pain neuroscience education group to address management strategies for chronic symptoms while incorporating socialization to promote progress towards functional goals.  TA:  Week 7: Patient educated regarding the role of the primary somatosensory cortex in pain and body maps (which depend on movement and use of the body parts) and that for people with persistent pain, decreased use of the body and other biologic processes change the body maps. Laterality proficiency was discussed and addressed.  Reviewed homework from prior weeks (positive activity engagement, progressive muscle relaxation, guided imagery).  Provided education on the effects of diet on chronic pain. Characteristics of anti-inflammatory diets were reviewed. Education on the effects of smoking on chronic pain was provided.      TherEx:  Week 7 - Generalized Strengthening and Coordination  2 minutes each:  - Week 7 - Ambulation w/ alternate hand to knee taps x2 rounds  - Week 7 - Bird dogs  - Week 7 - Agility ladder forward x2 minutes, backward x2 minutes      Assessment: Tolerated treatment well. Notable symptoms of visual-vestibular integration dysfunction at end of session when navigating in crowded environment. Pt would benefit from continued OT services to address these deficits      Plan: Continued skilled OT per POC.        SHORT TERM GOALS    Pt will demo G recall of 75% of verbal/written  information utilizing memory strategy of choice for improved delayed memory   Pt will increase delayed recall being able to recall 3 items on RBANs in LTM   Pt will increase oculomotor control for improved saccades, con/divergent tasks for improved reading, board to table tasks with minimal increase in symptoms 4 weeks    LONG TERM GOALS:     Memory     Pt will demo G recall of 85% of verbal/written information utilizing memory strategy of choice for improved delayed memory   Pt will increase delayed recall being able to recall 5 items on RBANs in LTM   Pt will improve overall score on RBANs to within average ranges.   Pt will increase oculomotor control for improved saccades, con/divergent tasks for improved reading, board to table tasks with no increase in symptoms in 12 weeks

## 2024-10-22 ENCOUNTER — OFFICE VISIT (OUTPATIENT)
Facility: CLINIC | Age: 69
End: 2024-10-22
Payer: MEDICARE

## 2024-10-22 DIAGNOSIS — H53.9 VISION CHANGES: Primary | ICD-10-CM

## 2024-10-22 DIAGNOSIS — R26.89 IMBALANCE: Primary | ICD-10-CM

## 2024-10-22 DIAGNOSIS — R42 DIZZINESS: ICD-10-CM

## 2024-10-22 DIAGNOSIS — R41.9 COGNITIVE COMPLAINTS: ICD-10-CM

## 2024-10-22 PROCEDURE — 97112 NEUROMUSCULAR REEDUCATION: CPT

## 2024-10-22 NOTE — PROGRESS NOTES
Daily Note     Today's date: 10/22/2024  Patient name: Yaya Rodriguez  : 1955  MRN: [id number]  Referring provider: Tracy Moore MD  Dx:   Encounter Diagnosis     ICD-10-CM    1. Imbalance  R26.89       2. Dizziness  R42             Subjective: Patient reports no new changes, complaints, or falls. Pt reports he is considering joining the gym.     Objective: See treatment diary below    TE:  Recumbent bike hills level 8 x 10 minutes     NMR:  - Ball toss with rotational turn, pulling squiz off the wall based on color called out by therapist + looking up and naming an object based on the blaze pod color x 15 minutes  - Tennis ball rolled to patient + patient stand up and throw the ball back to student x 15  - Tennis ball rolled to patient + patient stand up + look up to ceiling then throw ball x 15  - Reaching up and picking up physioballs off the shelf 5 physioball x 2 sets  - Reaching up and picking up physioballs off the shelf 5 physioball x 2 sets   - standing on foam beam       Assessment: Patient tolerated PT session well today with habituation of looking upward and non-complaint surfaces. Pt demonstrated moderate trunk A-P swaying with sustained cervical extension to look up to the celing. Plan to continue with balance activities with balance and dual task to reduce fall risk and maximize function.      Plan:  Continue with PLOC - progress as appropriate. Gaze stabilization/habituation/balance with cognitive/motor dual tasking + cervical stretching/strengthening.           Outcome Measures Initial Eval  2024 2024 9/3/24 10/8/24 10/15/24    5xSTS 7.38 sec without UE support 6.95 sec without AD   5.95 sec without AD Defer to NV due to time constraint 5.78 seconds without UE support    TUG  - Regular  - Cognitive    8.38 sec  - 9.95 (subtracting 3 from 100, multiple errors)    7.25 sec    7.15 (Subtracting 3 from 100, multiple  errors)   7.33 second     5.83 seconds (subtracting 3, multiple error) Defer to NV due to time constraint   6.93 seconds       6.82 seconds (counting backwards from 3)    10 meter 1.14  1,33 m/s  1.34 m/s  Defer to NV due to time constraint NT    FGA 23/30 26/30 26/30 Defer to NV due to time constraint 29/30    DGI Defer         mCTSIB  - FTEO (firm)  - FTEC (firm)  - FTEO (foam)  - FTEC (foam) - 30 sec  - 30 sec (+)  - 30 secs  - 30 sec (+) - 30 sec  - 30 sec (+)  - 30 sec  - 30 sec (+)  - 30 sec  - 30 sec (+)  - 30 sec  - 30 sec (+) Defer to NV due to time constraint - 30s  - 30s  - 30s  - 30s     I  54/100 64/100 64/100                          Access Code: [email or web address]  URL: [email or web address]  Date: 08/07/2024  Prepared by: Francesca Arroyo-Matsuba    Exercises  - Seated Cervical Sidebending Stretch  - 1 x daily - 7 x weekly - 3 sets - 30s hold  - Seated Levator Scapulae Stretch  - 1 x daily - 7 x weekly - 3 sets - 30s hold  - Standing Cervical Sidebending AROM  - 1 x daily - 7 x weekly - 3 sets - 10 reps  - Seated Cervical Retraction  - 1 x daily - 7 x weekly - 3 sets - 10 reps

## 2024-10-22 NOTE — PROGRESS NOTES
"OT Daily Note     Today's date: 10/16/2024  Patient name: Yaya Rodriguez  : 1955  MRN: [id number]  Referring provider: Tracy Moore MD  Dx:   Encounter Diagnoses   Name Primary?    Vision changes Yes    Cognitive complaints          Start Time: 845  Stop Time: 930  Total time in clinic (min): 45 minutes    PLAN OF CARE START: 2024  PLAN OF CARE END: 10/16/2024  FREQUENCY: 2-3x per week for 8-12 weeks  PRECAUTIONS balance deficits     Subjective: Pt states he was working on a friend's car this past weekend.     Objective: See treatment below.    NMR:   -Completed exercise using spot it to have pt turning head in all planes to work on visual vestibular integration, saccades, and attention   -Completed 1 simple qbitz and 1 qbitz extreme with pt standing on foam at table to looking upward at distance to copy pattern provided. Activity focused on convergence/divergence,  skills, visual vestibular integration. Pt did well with simple qbitz, however required increased time and mod cues from the therapist to complete qbitz extreme puzzle.       Assessment: Tolerated treatment well. Pt reported no increase in symptoms during exercise when he was \"concentrating on a task\", however stated symptoms would start as soon as the task was terminated. Continued deficits with OM skills and visual vestibular integration. Pt would benefit from continued OT services to address these deficits      Plan: Continued skilled OT per POC.        SHORT TERM GOALS    Pt will demo G recall of 75% of verbal/written information utilizing memory strategy of choice for improved delayed memory   Pt will increase delayed recall being able to recall 3 items on RBANs in LTM   Pt will increase oculomotor control for improved saccades, con/divergent tasks for improved reading, board to table tasks with minimal increase in symptoms 4 weeks    LONG TERM GOALS:     Memory     Pt will demo G recall of 85% of verbal/written information " utilizing memory strategy of choice for improved delayed memory   Pt will increase delayed recall being able to recall 5 items on RBANs in LTM   Pt will improve overall score on RBANs to within average ranges.   Pt will increase oculomotor control for improved saccades, con/divergent tasks for improved reading, board to table tasks with no increase in symptoms in 12 weeks

## 2024-10-23 ENCOUNTER — OFFICE VISIT (OUTPATIENT)
Facility: CLINIC | Age: 69
End: 2024-10-23
Payer: MEDICARE

## 2024-10-23 DIAGNOSIS — H53.9 VISION CHANGES: Primary | ICD-10-CM

## 2024-10-23 DIAGNOSIS — R41.9 COGNITIVE COMPLAINTS: ICD-10-CM

## 2024-10-23 PROCEDURE — 97530 THERAPEUTIC ACTIVITIES: CPT | Performed by: OCCUPATIONAL THERAPIST

## 2024-10-23 PROCEDURE — 97110 THERAPEUTIC EXERCISES: CPT | Performed by: OCCUPATIONAL THERAPIST

## 2024-10-24 NOTE — PROGRESS NOTES
OT Daily Note     Today's date: 10/16/2024  Patient name: Yaya Rodriguez  : 1955  MRN: [id number]  Referring provider: Tracy Moore MD  Dx:   Encounter Diagnoses   Name Primary?    Vision changes Yes    Cognitive complaints                     PLAN OF CARE START: 2024  PLAN OF CARE END: 2024  FREQUENCY: 2-3x per week for 8-12 weeks  PRECAUTIONS balance deficits     Subjective: Pt reports he cut his dose of acetazolamide in half because it was expensive. He states that his sxs of dizziness have increased since decreasing his medication. Pt instructed to follow up with his physician regarding changing medication dose.    Objective: See treatment below.  Pt participated in pain neuroscience education group to address management strategies for chronic symptoms while incorporating socialization to promote progress towards functional goals.  TA:  Week 8: Patient educated on the relationship between emotions and pain, and the role that fear, catastrophization, nociception and threat play in persistent symptoms, by activating the body's alarm system, resulting in hypersensitive nerves. Metaphors incorporated to foster deep learning and paradigm shift for patient. Provided with resources regarding alternative medicines, including natural herbs, massage, and acupuncture, expressed good understanding. Reviewed functional limitations and timeline of events following increased stress prior to previous session.  Neuroplasticity was defined and factors that contribute to it were reviewed.  The benefits of mindfulness for promoting neuroplastic changes when dealing with chronic symptoms were discussed. Cognitive distortions and the importance of recognizing and working toward more constructive ways of thinking were reviewed. Extensive education/discussion regarding pt's symptoms of depression (ie. Apathy, sleep changes, loss of motivation) and how they can contribute to worsened symptoms. Pt encouraged to  address concerns for depression with his doctor in order to get clarification of how his mental health may be affecting his recovery s/p concussion. Discussed need for meaningful occupations, as pt states he spends most of his time watching TV, but used to be very active and worked a FT job. Encouraged pt to begin proactively creating additional new habits/routines to fill his time to promote feelings of fulfillment. Pt indicated understanding, and agreed to start going for walks for 1 hour a day on the days that he's not riding his bike. He also stated he plans to start a project that he has been putting off- changing the brakes on his car.     TherEx:  Week 8 - Generalized Strengthening and Modifying Functional Activities  Circuit 1  - Week 8 - Ambulation for 10 minutes  2 minutes each:  - Ascending/descending stairs  -Ascending/descending stairs while holding bosu ball  -Overhead wall balls with bosu ball    Assessment: Tolerated treatment well. Notable symptoms of visual-vestibular integration dysfunction at end of session after overhead wall balls. Best performance with ambulation noted when pt was completing stair negotiation while holding bosu. Pt would benefit from continued OT services to address these deficits      Plan: Continued skilled OT per POC.        SHORT TERM GOALS    Pt will demo G recall of 75% of verbal/written information utilizing memory strategy of choice for improved delayed memory   Pt will increase delayed recall being able to recall 3 items on RBANs in LTM   Pt will increase oculomotor control for improved saccades, con/divergent tasks for improved reading, board to table tasks with minimal increase in symptoms 4 weeks    LONG TERM GOALS:     Memory     Pt will demo G recall of 85% of verbal/written information utilizing memory strategy of choice for improved delayed memory   Pt will increase delayed recall being able to recall 5 items on RBANs in LTM   Pt will improve overall score on  RBANs to within average ranges.   Pt will increase oculomotor control for improved saccades, con/divergent tasks for improved reading, board to table tasks with no increase in symptoms in 12 weeks

## 2024-10-29 ENCOUNTER — OFFICE VISIT (OUTPATIENT)
Facility: CLINIC | Age: 69
End: 2024-10-29
Payer: MEDICARE

## 2024-10-29 DIAGNOSIS — R26.89 IMBALANCE: Primary | ICD-10-CM

## 2024-10-29 DIAGNOSIS — R42 DIZZINESS: ICD-10-CM

## 2024-10-29 PROCEDURE — 97110 THERAPEUTIC EXERCISES: CPT

## 2024-10-29 PROCEDURE — 97112 NEUROMUSCULAR REEDUCATION: CPT

## 2024-10-29 NOTE — PROGRESS NOTES
"                                                              Daily Note     Today's date: 10/29/2024  Patient name: Yaya Rodriguez  : 1955  MRN: [id number]  Referring provider: Tracy Moore MD  Dx:   Encounter Diagnosis     ICD-10-CM    1. Imbalance  R26.89       2. Dizziness  R42             Subjective: Patient reports no new changes, complaints, or falls. Reports he went bike riding yesterday.     Objective: See treatment diary below    TE:  Recumbent bike hills level 8 x 103minutes     NMR:  - Walking down the hallway + ball toss and visually following the ball with his head/eyes to look up 50 feet x 4 laps  - Tennis ball rolled to patient + patient stand up and throw ball back x 2 minutes  - Tennis ball rolled to patient + patient stand up + self ball toss vertically + throw ball back x 2 minutes  - Tennis ball rolled to patient + patient stand up + self ball toss vertically + throw ball back x 2 minutes   - on rolled up yoga mat  - Tennis ball rolled to patient + patient stand up + look up to ceiling then throw ball x 15  - Reaching up and grabbing physioballs off the shelf 5 physioball x 2 minutes  - Reaching up for physioball + 180 degree turn + throw ball up + return ball back to shelf x 2 minutes       Assessment: Patient tolerated PT session well today with habituation of looking upward and non-complaint surfaces. Minimal to no A-P swaying on flat surfaces indicated improved motor planning. Pt reports feeling of \"disorientation\" occurs initially with reduction during repetitive movements. Increase swaying with non-complaint surfaces. Pt reports feeling of \"disorientation\" when he stops activity and lasts approx 30 seconds.  Plan to continue with balance activities with balance and dual task to reduce fall risk and maximize function.      Plan:  Continue with PLOC - progress as appropriate. Gaze stabilization/habituation/balance with cognitive/motor dual tasking + cervical " stretching/strengthening.           Outcome Measures Initial Eval  7/1/2024 8/6/2024 9/3/24 10/8/24 10/15/24    5xSTS 7.38 sec without UE support 6.95 sec without AD   5.95 sec without AD Defer to NV due to time constraint 5.78 seconds without UE support    TUG  - Regular  - Cognitive    8.38 sec  - 9.95 (subtracting 3 from 100, multiple errors)    7.25 sec    7.15 (Subtracting 3 from 100, multiple errors)   7.33 second     5.83 seconds (subtracting 3, multiple error) Defer to NV due to time constraint   6.93 seconds       6.82 seconds (counting backwards from 3)    10 meter 1.14  1,33 m/s  1.34 m/s  Defer to NV due to time constraint NT    FGA 23/30 26/30 26/30 Defer to NV due to time constraint 29/30    DGI Defer         mCTSIB  - FTEO (firm)  - FTEC (firm)  - FTEO (foam)  - FTEC (foam) - 30 sec  - 30 sec (+)  - 30 secs  - 30 sec (+) - 30 sec  - 30 sec (+)  - 30 sec  - 30 sec (+)  - 30 sec  - 30 sec (+)  - 30 sec  - 30 sec (+) Defer to NV due to time constraint - 30s  - 30s  - 30s  - 30s     [facility]  54/100 64/100 64/100                          Access Code: [email or web address]  URL: [email or web address]  Date: 08/07/2024  Prepared by: Francesca Arroyo-Jacqueline    Exercises  - Seated Cervical Sidebending Stretch  - 1 x daily - 7 x weekly - 3 sets - 30s hold  - Seated Levator Scapulae Stretch  - 1 x daily - 7 x weekly - 3 sets - 30s hold  - Standing Cervical Sidebending AROM  - 1 x daily - 7 x weekly - 3 sets - 10 reps  - Seated Cervical Retraction  - 1 x daily - 7 x weekly - 3 sets - 10 reps

## 2024-10-30 ENCOUNTER — OFFICE VISIT (OUTPATIENT)
Facility: CLINIC | Age: 69
End: 2024-10-30
Payer: MEDICARE

## 2024-10-30 DIAGNOSIS — R41.9 COGNITIVE COMPLAINTS: ICD-10-CM

## 2024-10-30 DIAGNOSIS — H53.9 VISION CHANGES: Primary | ICD-10-CM

## 2024-10-30 PROCEDURE — 97530 THERAPEUTIC ACTIVITIES: CPT | Performed by: OCCUPATIONAL THERAPIST

## 2024-10-30 PROCEDURE — 97110 THERAPEUTIC EXERCISES: CPT | Performed by: OCCUPATIONAL THERAPIST

## 2024-10-30 NOTE — PROGRESS NOTES
OT Daily Note     Today's date: 10/16/2024  Patient name: Yaya Rodriguez  : 1955  MRN: [id number]  Referring provider: Tracy Moore MD  Dx:   Encounter Diagnoses   Name Primary?    Vision changes Yes    Cognitive complaints          Start Time: 1503  Stop Time: 1628  Total time in clinic (min): 85 minutes    PLAN OF CARE START: 2024  PLAN OF CARE END: 2024  FREQUENCY: 2-3x per week for 8-12 weeks  PRECAUTIONS balance deficits     Subjective: No changes reported since last session.    Objective: See treatment below.  Pt participated in pain neuroscience education group to address management strategies for chronic symptoms while incorporating socialization to promote progress towards functional goals.    TA:  Week 9: Recap of main points of weeks 1-8 including the oversensitive nervous system, yellow flags, nerve sensors, hyperalgesia, the impact of mental health on chronic pain/dizziness, mindfulness based stress reduction strategies, the effects of smoking, diet, and exercise on chronic symptoms.     TherEx:  -10 minutes walking on treadmill. Completed .43 miles. Signs of impaired visual-vestibular integration when stepping off treadmill.    Assessment: Tolerated treatment well. Notable symptoms of visual-vestibular integration dysfunction at end of session after overhead wall balls. Best performance with ambulation noted when pt was completing stair negotiation while holding bosu. Pt would benefit from continued OT services to address these deficits      Plan: Continued skilled OT per POC.        SHORT TERM GOALS    Pt will demo G recall of 75% of verbal/written information utilizing memory strategy of choice for improved delayed memory   Pt will increase delayed recall being able to recall 3 items on RBANs in LTM   Pt will increase oculomotor control for improved saccades, con/divergent tasks for improved reading, board to table tasks with minimal increase in symptoms 4 weeks    LONG TERM  GOALS:     Memory     Pt will demo G recall of 85% of verbal/written information utilizing memory strategy of choice for improved delayed memory   Pt will increase delayed recall being able to recall 5 items on RBANs in LTM   Pt will improve overall score on RBANs to within average ranges.   Pt will increase oculomotor control for improved saccades, con/divergent tasks for improved reading, board to table tasks with no increase in symptoms in 12 weeks

## 2024-11-05 ENCOUNTER — OFFICE VISIT (OUTPATIENT)
Facility: CLINIC | Age: 69
End: 2024-11-05
Payer: MEDICARE

## 2024-11-05 DIAGNOSIS — H53.9 VISION CHANGES: Primary | ICD-10-CM

## 2024-11-05 DIAGNOSIS — R41.9 COGNITIVE COMPLAINTS: ICD-10-CM

## 2024-11-05 PROCEDURE — 97112 NEUROMUSCULAR REEDUCATION: CPT | Performed by: OCCUPATIONAL THERAPIST

## 2024-11-05 NOTE — PROGRESS NOTES
OT Daily Note     Today's date: 10/16/2024  Patient name: Yaya Rodriguez  : 1955  MRN: [id number]  Referring provider: Tracy Moore MD  Dx:   Encounter Diagnoses   Name Primary?    Vision changes Yes    Cognitive complaints          Start Time: 802  Stop Time: 842  Total time in clinic (min): 40 minutes    PLAN OF CARE START: 2024  PLAN OF CARE END: 2024  FREQUENCY: 2-3x per week for 8-12 weeks  PRECAUTIONS balance deficits     Subjective: Pt reports he went back to taking his normal medication dose and feels slightly better than with half the dose.    Objective: See treatment below.  NMR:  -Walking marker push-ups x5 minutes focusing on OM control for convergence/accommodation and visual vestibular integration.   -BITS 4 5x5 letter/number/arrow Aaron charts alternating with large Aaron chart on tabletop incorporating up/down head movements for visual-vestibular integration.   -BITS rotator x52 letters, speed 3, with pt also alternately pointing to the same letter on large Aaron chart on tabletop to incorporate up/down head movements for visual-vestibular integration.  -Tricky fingers with prompt taped overhead x3 rounds focusing on visual-vestibular integration.  -Qbitz x1 round with prompt taped overhead focusing on visual-vestibular integration and  skills. Plan to grade up  skills challenge in upcoming sessions.      Assessment: Tolerated treatment well. Notable symptoms of visual-vestibular integration dysfunction during walking marker push-ups. Pt would benefit from continued OT services to address OM, visual-vestibular integration, and cognitive deficits.      Plan: Continued skilled OT per POC.        SHORT TERM GOALS    Pt will demo G recall of 75% of verbal/written information utilizing memory strategy of choice for improved delayed memory   Pt will increase delayed recall being able to recall 3 items on RBANs in LTM   Pt will increase oculomotor control for improved saccades,  con/divergent tasks for improved reading, board to table tasks with minimal increase in symptoms 4 weeks    LONG TERM GOALS:     Memory     Pt will demo G recall of 85% of verbal/written information utilizing memory strategy of choice for improved delayed memory   Pt will increase delayed recall being able to recall 5 items on RBANs in LTM   Pt will improve overall score on RBANs to within average ranges.   Pt will increase oculomotor control for improved saccades, con/divergent tasks for improved reading, board to table tasks with no increase in symptoms in 12 weeks

## 2024-11-12 ENCOUNTER — OFFICE VISIT (OUTPATIENT)
Facility: CLINIC | Age: 69
End: 2024-11-12
Payer: MEDICARE

## 2024-11-12 DIAGNOSIS — H53.9 VISION CHANGES: Primary | ICD-10-CM

## 2024-11-12 DIAGNOSIS — R41.9 COGNITIVE COMPLAINTS: ICD-10-CM

## 2024-11-12 DIAGNOSIS — R42 DIZZINESS: Primary | ICD-10-CM

## 2024-11-12 DIAGNOSIS — R26.89 IMBALANCE: ICD-10-CM

## 2024-11-12 PROCEDURE — 97530 THERAPEUTIC ACTIVITIES: CPT

## 2024-11-12 PROCEDURE — 97530 THERAPEUTIC ACTIVITIES: CPT | Performed by: OCCUPATIONAL THERAPIST

## 2024-11-12 PROCEDURE — 97112 NEUROMUSCULAR REEDUCATION: CPT | Performed by: OCCUPATIONAL THERAPIST

## 2024-11-12 NOTE — PROGRESS NOTES
"                                                              Daily Note     Today's date: 2024  Patient name: Yaya Rodriguez  : 1955  MRN: [id number]  Referring provider: Tracy Moore MD  Dx:   Encounter Diagnosis     ICD-10-CM    1. Dizziness  R42       2. Imbalance  R26.89           Patient treated by AYE Burrell with supervision from this PT.  We discussed appropriate care and I reviewed the documentation.    Subjective: Patient reports no new changes, complaints, or falls.      Objective: See treatment diary below    TE:  Recumbent bike hills level 10 x 10 minutes     NMR:  - taking squigs from container on floor + looking up to place on window x2min  - taking squigs from container on floor + looking up to place on window on airex  x2min  - walking in hallway drawing letters on ceiling every three steps with laser x100ft  -walking in hallway holding 10# TT drawing letters on ceiling with laser every three steps x50 ft  -walking in hallway holding 10# TT drawing letters on ceiling  with laser every three steps looking all the way up from floor x50 ft  - ball toss looking up to catch pass from partner x2min  - self ball toss on airex looking from floor to ceiling to catch x1 min    Assessment: Patient tolerated PT session well today with habituation of looking upward and non-complaint surfaces. Minimal to no A-P swaying on flat surfaces indicated improved motor planning. Pt reports feeling of \"disorientation\" occurs initially with reduction during repetitive movements. Increase swaying with non-complaint surfaces.  Pt tolerated new exercise of walking and drawing letters well, as he was able to progress to holding a tidal tank and looking all the way from the floor as opposed to looking from forward.  Pt reports disorientation seems to be taking less time to resolve after movement than before, indicating adaptation of the vestibular system. Pt also reports doing more things at home that " challenge him to look up, like picking up items off the floor after they fall instead of leaving them until he must pick them up.   Plan to continue with balance activities with balance and dual task to reduce fall risk and maximize function.      Plan:  Continue with PLOC - progress as appropriate. Gaze stabilization/habituation/balance with cognitive/motor dual tasking + cervical stretching/strengthening.           Outcome Measures Initial Eval  7/1/2024 8/6/2024 9/3/24 10/8/24 10/15/24    5xSTS 7.38 sec without UE support 6.95 sec without AD   5.95 sec without AD Defer to NV due to time constraint 5.78 seconds without UE support    TUG  - Regular  - Cognitive    8.38 sec  - 9.95 (subtracting 3 from 100, multiple errors)    7.25 sec    7.15 (Subtracting 3 from 100, multiple errors)   7.33 second     5.83 seconds (subtracting 3, multiple error) Defer to NV due to time constraint   6.93 seconds       6.82 seconds (counting backwards from 3)    10 meter 1.14  1,33 m/s  1.34 m/s  Defer to NV due to time constraint NT    FGA 23/30 26/30 26/30 Defer to NV due to time constraint 29/30    DGI Defer         mCTSIB  - FTEO (firm)  - FTEC (firm)  - FTEO (foam)  - FTEC (foam) - 30 sec  - 30 sec (+)  - 30 secs  - 30 sec (+) - 30 sec  - 30 sec (+)  - 30 sec  - 30 sec (+)  - 30 sec  - 30 sec (+)  - 30 sec  - 30 sec (+) Defer to NV due to time constraint - 30s  - 30s  - 30s  - 30s     I  54/100 64/100 64/100                          Access Code: [email or web address]  URL: [email or web address]  Date: 08/07/2024  Prepared by: Francesca Head    Exercises  - Seated Cervical Sidebending Stretch  - 1 x daily - 7 x weekly - 3 sets - 30s hold  - Seated Levator Scapulae Stretch  - 1 x daily - 7 x weekly - 3 sets - 30s hold  - Standing Cervical Sidebending AROM  - 1 x daily - 7 x weekly - 3 sets - 10 reps  - Seated Cervical Retraction  - 1 x daily - 7 x weekly - 3 sets - 10 reps

## 2024-11-12 NOTE — PROGRESS NOTES
OT Daily Note     Today's date: 10/16/2024  Patient name: Yaya Rodriguez  : 1955  MRN: [id number]  Referring provider: Tracy Moore MD  Dx:   Encounter Diagnoses   Name Primary?    Vision changes Yes    Cognitive complaints          Start Time: 847  Stop Time: 929  Total time in clinic (min): 42 minutes    PLAN OF CARE START: 2024  PLAN OF CARE END: 2024  FREQUENCY: 2-3x per week for 8-12 weeks  PRECAUTIONS balance deficits     Subjective: Pt reported that when he concentrates he does not feel unsteady (stated after reverse sentences activity)    Objective: See treatment below.  TA/NMR:  -While standing pt completed sentences in reverse worksheet with prompt taped overhead and pt looking upward to read, then writing on paper on tabletop to address dynamic OM control, EF, and visual-vestibular integration. Required extra time, no sxs reported.  -120 number board activity with prompt taped overhead, number tiles on low box posteriorly. Pt memorized 3 numbers at a time to locate and then place on board on tabletop. Addressed dynamic OM control, visual-vestibular integration, saccades, working memory.       Assessment: Tolerated treatment well. No sxs reported during activities today. Plan for re-eval next session to assess progress with POC.      Plan: Continued skilled OT per POC.        SHORT TERM GOALS    Pt will demo G recall of 75% of verbal/written information utilizing memory strategy of choice for improved delayed memory   Pt will increase delayed recall being able to recall 3 items on RBANs in LTM   Pt will increase oculomotor control for improved saccades, con/divergent tasks for improved reading, board to table tasks with minimal increase in symptoms 4 weeks    LONG TERM GOALS:     Memory     Pt will demo G recall of 85% of verbal/written information utilizing memory strategy of choice for improved delayed memory   Pt will increase delayed recall being able to recall 5 items on  RBANs in LTM   Pt will improve overall score on RBANs to within average ranges.   Pt will increase oculomotor control for improved saccades, con/divergent tasks for improved reading, board to table tasks with no increase in symptoms in 12 weeks

## 2024-11-13 ENCOUNTER — EVALUATION (OUTPATIENT)
Facility: CLINIC | Age: 69
End: 2024-11-13
Payer: MEDICARE

## 2024-11-13 DIAGNOSIS — R41.9 COGNITIVE COMPLAINTS: ICD-10-CM

## 2024-11-13 DIAGNOSIS — H53.9 VISION CHANGES: Primary | ICD-10-CM

## 2024-11-13 PROCEDURE — 97530 THERAPEUTIC ACTIVITIES: CPT | Performed by: OCCUPATIONAL THERAPIST
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
## 2024-11-19 ENCOUNTER — APPOINTMENT (OUTPATIENT)
Facility: CLINIC | Age: 69
End: 2024-11-19
Payer: MEDICARE

## 2024-11-19 ENCOUNTER — EVALUATION (OUTPATIENT)
Facility: CLINIC | Age: 69
End: 2024-11-19
Payer: MEDICARE

## 2024-11-19 DIAGNOSIS — R42 DIZZINESS: ICD-10-CM

## 2024-11-19 DIAGNOSIS — R26.89 IMBALANCE: Primary | ICD-10-CM

## 2024-11-19 PROCEDURE — 97530 THERAPEUTIC ACTIVITIES: CPT
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
## 2024-11-20 ENCOUNTER — OFFICE VISIT (OUTPATIENT)
Facility: CLINIC | Age: 69
End: 2024-11-20
Payer: MEDICARE

## 2024-11-20 DIAGNOSIS — R41.9 COGNITIVE COMPLAINTS: ICD-10-CM

## 2024-11-20 DIAGNOSIS — H53.9 VISION CHANGES: Primary | ICD-10-CM

## 2024-11-20 PROCEDURE — 97112 NEUROMUSCULAR REEDUCATION: CPT | Performed by: OCCUPATIONAL THERAPIST

## 2024-11-20 NOTE — PROGRESS NOTES
OT Daily Note     Today's date: 10/16/2024  Patient name: Yaya Rodriguez  : 1955  MRN: [id number]  Referring provider: Tracy Moore MD  Dx:   Encounter Diagnoses   Name Primary?    Vision changes Yes    Cognitive complaints          Start Time: 801  Stop Time: 841  Total time in clinic (min): 40 minutes    PLAN OF CARE START: 2024  PLAN OF CARE END: 2024  FREQUENCY: 2-3x per week for 8-12 weeks  PRECAUTIONS balance deficits     Subjective: Pt reported that when he concentrates he does not feel unsteady (stated after reverse sentences activity)    Objective: See treatment below.  NMR:  -While walking pt completed scrambled words worksheet focusing on visual-vestibular integration and EF. Casey/cues to solve, no sxs reported while walking, but significant postural sway noted when static standing.  -Spot it doing STS for each pair, then graded up to turning to retrieve cards from low surface and place on elevated table. Addressed OM control, visual-vestibular integration, sustained attention.   -Qbitz standing in checkerboard cubby x2 focusing on  skills and visual vestibular integration.    TA:  -Scrambled sentences worksheet initiated to address EF.      Assessment: Tolerated treatment fairly. Significant postural sway noted when going from walking to static standing.       Plan: Continued skilled OT per POC. Continue incorporating dual tasks with walking and frequent stopping/starting.        SHORT TERM GOALS    Pt will demo G recall of 75% of verbal/written information utilizing memory strategy of choice for improved delayed memory   Pt will increase delayed recall being able to recall 3 items on RBANs in LTM   Pt will increase oculomotor control for improved saccades, con/divergent tasks for improved reading, board to table tasks with minimal increase in symptoms 4 weeks    LONG TERM GOALS:     Memory     Pt will demo G recall of 85% of verbal/written information utilizing memory  strategy of choice for improved delayed memory   Pt will increase delayed recall being able to recall 5 items on RBANs in LTM   Pt will improve overall score on RBANs to within average ranges.   Pt will increase oculomotor control for improved saccades, con/divergent tasks for improved reading, board to table tasks with no increase in symptoms in 12 weeks

## 2024-11-26 ENCOUNTER — APPOINTMENT (OUTPATIENT)
Facility: CLINIC | Age: 69
End: 2024-11-26
Payer: MEDICARE

## 2024-11-27 ENCOUNTER — OFFICE VISIT (OUTPATIENT)
Facility: CLINIC | Age: 69
End: 2024-11-27
Payer: MEDICARE

## 2024-11-27 DIAGNOSIS — H53.9 VISION CHANGES: Primary | ICD-10-CM

## 2024-11-27 DIAGNOSIS — R41.9 COGNITIVE COMPLAINTS: ICD-10-CM

## 2024-11-27 PROCEDURE — 97112 NEUROMUSCULAR REEDUCATION: CPT | Performed by: OCCUPATIONAL THERAPIST

## 2024-11-27 NOTE — PROGRESS NOTES
OT Daily Note     Today's date: 10/16/2024  Patient name: Yaya Rodriguez  : 1955  MRN: [id number]  Referring provider: Tracy Moore MD  Dx:   Encounter Diagnoses   Name Primary?    Vision changes Yes    Cognitive complaints          Start Time: 802  Stop Time: 844  Total time in clinic (min): 42 minutes    PLAN OF CARE START: 2024  PLAN OF CARE END: 2024  FREQUENCY: 2-3x per week for 8-12 weeks  PRECAUTIONS balance deficits     Subjective: No changes since last session reported    Objective: See treatment below.  NMR:  -While walking pt completed marker push-ups focusing on OM control for convergence/accommodation and visual-vestibular integration  -Standing on foam beam pt completed number fill-ins with key hanging on elevated surface to incorporate up/down head movements to address OM control for convergence with cog loading and balance challenge.  -Walking with stops and turns every few feet while completing abstract thinking worksheet to address visual-vestibular integration.      Assessment: Tolerated treatment fairly. Significant postural sway noted when going from walking to static standing after marker push-ups. Difficulty with abstract thinking activity.       Plan: Continued skilled OT per POC. Continue incorporating dual tasks with walking and frequent stopping/starting.        SHORT TERM GOALS    Pt will demo G recall of 75% of verbal/written information utilizing memory strategy of choice for improved delayed memory   Pt will increase delayed recall being able to recall 3 items on RBANs in LTM   Pt will increase oculomotor control for improved saccades, con/divergent tasks for improved reading, board to table tasks with minimal increase in symptoms 4 weeks    LONG TERM GOALS:     Memory     Pt will demo G recall of 85% of verbal/written information utilizing memory strategy of choice for improved delayed memory   Pt will increase delayed recall being able to recall 5 items on  RBANs in LTM   Pt will improve overall score on RBANs to within average ranges.   Pt will increase oculomotor control for improved saccades, con/divergent tasks for improved reading, board to table tasks with no increase in symptoms in 12 weeks

## 2024-12-04 ENCOUNTER — OFFICE VISIT (OUTPATIENT)
Facility: CLINIC | Age: 69
End: 2024-12-04
Payer: MEDICARE

## 2024-12-04 DIAGNOSIS — H53.9 VISION CHANGES: Primary | ICD-10-CM

## 2024-12-04 DIAGNOSIS — R41.9 COGNITIVE COMPLAINTS: ICD-10-CM

## 2024-12-04 PROCEDURE — 97112 NEUROMUSCULAR REEDUCATION: CPT | Performed by: OCCUPATIONAL THERAPIST

## 2024-12-04 NOTE — PROGRESS NOTES
OT Daily Note     Today's date: 10/16/2024  Patient name: Yaya Rodriguez  : 1955  MRN: [id number]  Referring provider: Tracy Moore MD  Dx:   Encounter Diagnoses   Name Primary?    Vision changes Yes    Cognitive complaints          Start Time: 805  Stop Time: 844  Total time in clinic (min): 39 minutes    PLAN OF CARE START: 2024  PLAN OF CARE END: 2024  FREQUENCY: 2-3x per week for 8-12 weeks  PRECAUTIONS balance deficits     Subjective: No changes since last session reported    Objective: See treatment below.  NMR:  -While walking pt completed marker push-ups focusing on OM control for convergence/accommodation and visual-vestibular integration  -Standing on foam beam pt completed number fill-ins with key hanging on elevated surface to incorporate up/down head movements to address OM control for convergence with cog loading and balance challenge.  -Walking with stops and turns every few feet while completing abstract thinking worksheet to address visual-vestibular integration.      Assessment: Tolerated treatment fairly. Significant postural sway noted when going from walking to static standing after marker push-ups. Difficulty with abstract thinking activity.       Plan: Continued skilled OT per POC. Continue incorporating dual tasks with walking and frequent stopping/starting.        SHORT TERM GOALS    Pt will demo G recall of 75% of verbal/written information utilizing memory strategy of choice for improved delayed memory   Pt will increase delayed recall being able to recall 3 items on RBANs in LTM   Pt will increase oculomotor control for improved saccades, con/divergent tasks for improved reading, board to table tasks with minimal increase in symptoms 4 weeks    LONG TERM GOALS:     Memory     Pt will demo G recall of 85% of verbal/written information utilizing memory strategy of choice for improved delayed memory   Pt will increase delayed recall being able to recall 5 items on  RBANs in LTM   Pt will improve overall score on RBANs to within average ranges.   Pt will increase oculomotor control for improved saccades, con/divergent tasks for improved reading, board to table tasks with no increase in symptoms in 12 weeks  OT Daily Note     Today's date: 10/16/2024  Patient name: Yaya Rodriguez  : 1955  MRN: [id number]  Referring provider: Tracy Moore MD  Dx:   Encounter Diagnoses   Name Primary?    Vision changes Yes    Cognitive complaints          Start Time: 805  Stop Time: 844  Total time in clinic (min): 39 minutes    PLAN OF CARE START: 2024  PLAN OF CARE END: 2024  FREQUENCY: 2-3x per week for 8-12 weeks  PRECAUTIONS balance deficits     Subjective: No changes since last session reported    Objective: See treatment below.  NMR:  -Standing on foam in checkerboard cubby pt completed number/symbol decoding activity with each number/symbol combination on a different post-it and placed randomly in checkerboard. Addressed visual-vestibular integration. At end of activity pt stated he felt OK throughout and was surprised by that.   -Walking marker push-ups with Aaron chart x5 minutes. One LOB utilizing stepping strategy and braced against wall to regain balance.  -Walking with intermittent stops/turns at ines pads while completing omit 1 letter worksheets. LOB 2x when stopping requiring CGA. Required Demetria for solving items on worksheet.      Assessment: Tolerated treatment fairly. Significant postural sway and use of stepping strategy at time d/t LOB during dynamic activities. Difficulty with abstract thinking activity.       Plan: Continued skilled OT per POC. Continue incorporating dual tasks with walking and frequent stopping/starting.        SHORT TERM GOALS    Pt will demo G recall of 75% of verbal/written information utilizing memory strategy of choice for improved delayed memory   Pt will increase delayed recall being able to recall 3 items on RBANs in LTM    Pt will increase oculomotor control for improved saccades, con/divergent tasks for improved reading, board to table tasks with minimal increase in symptoms 4 weeks    LONG TERM GOALS:     Memory     Pt will demo G recall of 85% of verbal/written information utilizing memory strategy of choice for improved delayed memory   Pt will increase delayed recall being able to recall 5 items on RBANs in LTM   Pt will improve overall score on RBANs to within average ranges.   Pt will increase oculomotor control for improved saccades, con/divergent tasks for improved reading, board to table tasks with no increase in symptoms in 12 weeks

## 2024-12-11 ENCOUNTER — OFFICE VISIT (OUTPATIENT)
Facility: CLINIC | Age: 69
End: 2024-12-11
Payer: MEDICARE

## 2024-12-11 DIAGNOSIS — R41.9 COGNITIVE COMPLAINTS: ICD-10-CM

## 2024-12-11 DIAGNOSIS — H53.9 VISION CHANGES: Primary | ICD-10-CM

## 2024-12-11 PROCEDURE — 96125 COGNITIVE TEST BY HC PRO: CPT | Performed by: OCCUPATIONAL THERAPIST

## 2024-12-11 PROCEDURE — 97530 THERAPEUTIC ACTIVITIES: CPT | Performed by: OCCUPATIONAL THERAPIST
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
## 2024-12-16 ENCOUNTER — OFFICE VISIT (OUTPATIENT)
Dept: NEUROLOGY | Facility: CLINIC | Age: 69
End: 2024-12-16
Payer: MEDICARE

## 2024-12-16 VITALS
TEMPERATURE: 97.9 F | SYSTOLIC BLOOD PRESSURE: 154 MMHG | HEART RATE: 54 BPM | BODY MASS INDEX: 27.72 KG/M2 | HEIGHT: 71 IN | DIASTOLIC BLOOD PRESSURE: 87 MMHG | WEIGHT: 198 LBS

## 2024-12-16 DIAGNOSIS — G43.009 MIGRAINE WITHOUT AURA AND WITHOUT STATUS MIGRAINOSUS, NOT INTRACTABLE: ICD-10-CM

## 2024-12-16 DIAGNOSIS — G44.329 CHRONIC POST-TRAUMATIC HEADACHE, NOT INTRACTABLE: Primary | ICD-10-CM

## 2024-12-16 PROCEDURE — G2211 COMPLEX E/M VISIT ADD ON: HCPCS | Performed by: PSYCHIATRY & NEUROLOGY

## 2024-12-16 PROCEDURE — 99215 OFFICE O/P EST HI 40 MIN: CPT | Performed by: PSYCHIATRY & NEUROLOGY

## 2024-12-16 RX ORDER — ACETAZOLAMIDE 500 MG/1
CAPSULE, EXTENDED RELEASE ORAL
Qty: 270 CAPSULE | Refills: 3 | Status: SHIPPED | OUTPATIENT
Start: 2024-12-16

## 2024-12-16 NOTE — PROGRESS NOTES
Review of Systems   Constitutional:  Negative for appetite change and fever.   HENT: Negative.  Negative for hearing loss, tinnitus, trouble swallowing and voice change.         Phonophobia   Eyes:  Positive for photophobia. Negative for pain.   Respiratory: Negative.  Negative for shortness of breath.    Cardiovascular: Negative.  Negative for palpitations.   Gastrointestinal: Negative.  Negative for nausea and vomiting.   Endocrine: Negative.  Negative for cold intolerance.   Genitourinary: Negative.  Negative for dysuria, frequency and urgency.   Musculoskeletal: Negative.  Negative for myalgias and neck pain.   Skin: Negative.  Negative for rash.   Neurological:  Positive for headaches (4-5 times a week). Negative for dizziness, tremors, seizures, syncope, facial asymmetry, speech difficulty, weakness, light-headedness and numbness.   Hematological: Negative.  Does not bruise/bleed easily.   Psychiatric/Behavioral:  Positive for sleep disturbance (trouble staying asleep). Negative for confusion and hallucinations.

## 2024-12-16 NOTE — PATIENT INSTRUCTIONS
"As we discussed I ordered another lab check of your electrolytes which ideally could be obtained in about 3 to 4 weeks after increasing the dose and it also includes calcium.    Let me know if you ever want me to send you back to the Neurosurgeon for possible NPH evaluation    Next time you see your PCP mention that your calcium was just ever so slightly elevated and they will follow-up with your next lab draw (sounds like they ordered follow up labs since the calcium came back in 8/2024)    -As we now have discussed at multiple visits please obtain the labs so that I can monitor your electrolytes  - I am glad that you stopped hydrochlorothiazide after we discussed with Dr. Argueta just because as we discussed acetazolamide and hydrochlorothiazide both can lower your potassium and act similarly      Please do follow-up with eye doctor for your regularly scheduled eye exam and please try and obtain the note for my review or have it faxed to us if possible.    Please follow up with sleep medicine regarding the sleep apnea and the CPAP machine with Dr. Ortiz as I think you have not seen him since 11/2022  -As we discussed in the past I placed a referral to the ENT/otolaryngology/ear nose and throat doctors and specifically know that Dr. Shafer with [facility] he is one of the doctors in the region who does the inspire device if this is something you are interested in as I would not give up on treating her sleep apnea - [phone]  - At some point, no pressure, consider following up for hearing test because if you do need hearing aids not wearing them could increase risk of dementia    -Continue to follow with primary care provider regarding secondary stroke prevention - high blood pressure, high cholesterol     Fall precautions     - \"Rewire Your Anxious Brain: How to Use the Neuroscience of Fear to End Anxiety, Panic, and Worry\" By Abilio PhD  - I recommend continued counselor for post traumatic stress "     Headache/migraine treatment:   Abortive medications (for immediate treatment of a headache):   It is ok to take ibuprofen, acetaminophen or naproxen (Advil, Tylenol,  Aleve, Excedrin) if they help your headaches you should limit these to No more than 3 times a week to avoid medication overuse/rebound headaches.       Over the counter preventive supplements for headaches/migraines (if you try, try for 3 months straight)  (to take every day to help prevent headaches - not to take at the time of headache):  There are combo pills online of these - none of which regulated by FDA and double check dosing - take appropriate dose only once a day- preventa migraine, migravent, mind ease, migrelief   [x] Magnesium 400mg daily (If any diarrhea or upset stomach, decrease dose  as tolerated)        Prescription preventive medications for headaches/migraines   (to take every day to help prevent headaches - not to take at the time of headache):  [x]  I suggest trial of lower dose diamox than we typically use in more severe cases -  -     acetaZOLAMIDE (DIAMOX)   -We discussed that instead of taking it at 7 AM and 7 PM we are going to transition to taking it around 7 AM, 3 PM and 10/11 PM -      * and if you do not feel better (I would give it a few weeks to a month at least or until next visit), you could always go back to twice a day. As we discussed I suspect the long-acting capsules last or have at least peak effect more around 8-hour timeframe rather than the 12-hour timeframe.      ------------  We discussed certainly another option if you are taken at 7 AM and 7 PM if you feel worse between 3 PM and 7 PM you absolutely could take a 250 mg short acting tab at 3 PM as it could help with the wearing off effect    - continue to take at 7 AM and 7 PM for now and we discussed the plan what the labs show we may be able to switch it to 3 times a day     - if a lower dose is helpful, can stay lower, if higher dose causes side  effects, go back to last tolerated dose.  If you get all the way up to the dose recommended and is not helping enough let me know as I will absolutely go higher.    - make sure to stay hydrated while on this as can cause dehydration since that is it's purpose to take fluid off.   - most common side effect is tingling of the nerves at times  - can cause electrolyte disturbances, but not typically in any significant way. However, be cautious if taking with other meds that lower potassium like hydrochlorothiazide etc    *Typically these types of medications take time untill you see the benefit, although some may see improvement in days, often it may take weeks, especially if the medication is being titrated up to a beneficial level. Please contact us if there are any concerns or questions regarding the medication.     Lifestyle Recommendations:  [x] SLEEP - Maintain a regular sleep schedule: Adults need at least 7-8 hours of uninterrupted a night. Maintain good sleep hygiene:  Going to bed and waking up at consistent times, avoiding excessive daytime naps, avoiding caffeinated beverages in the evening, avoid excessive stimulation in the evening and generally using bed primarily for sleeping.  One hour before bedtime would recommend turning lights down lower, decreasing your activity (may read quietly, listen to music at a low volume). When you get into bed, should eliminate all technology (no texting, emailing, playing with your phone, iPad or tablet in bed).  [x] HYDRATION - Maintain good hydration.  Drink  2L of fluid a day (4 typical small water bottles)  [x] DIET - Maintain good nutrition. In particular don't skip meals and try and eat healthy balanced meals regularly.  [x] EXERCISE - physical exercise as we all know is good for you in many ways, and not only is good for your heart, but also is beneficial for your mental health, cognitive health and  chronic pain/headaches. I would encourage at the least 5 days of  physical exercise weekly for at least 30 minutes.     Education and Follow-up  [x] Please call with any questions or concerns. Of course if any new concerning symptoms go to the emergency department.  [x] Follow up 3 months, sooner if needed
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
## 2024-12-31 ENCOUNTER — TELEPHONE (OUTPATIENT)
Dept: NEUROLOGY | Facility: CLINIC | Age: 69
End: 2024-12-31

## 2024-12-31 NOTE — TELEPHONE ENCOUNTER
Left multiple messages for patient to remind him to please schedule his ENT appointment that Dr. Moore requested.  Patient uses a [employer] screening service for calls so I left messages with that service.

## 2025-01-13 ENCOUNTER — TELEPHONE (OUTPATIENT)
Dept: NEUROLOGY | Facility: CLINIC | Age: 70
End: 2025-01-13

## 2025-01-13 NOTE — TELEPHONE ENCOUNTER
Called and spoke to patient to remind him to please make his ENT appointment that Dr. Moore had referred him to.  Patient said he would call this morning to get it scheduled.

## 2025-03-17 ENCOUNTER — OFFICE VISIT (OUTPATIENT)
Dept: NEUROLOGY | Facility: CLINIC | Age: 70
End: 2025-03-17
Payer: MEDICARE

## 2025-03-17 VITALS
WEIGHT: 196 LBS | BODY MASS INDEX: 27.44 KG/M2 | SYSTOLIC BLOOD PRESSURE: 150 MMHG | HEIGHT: 71 IN | HEART RATE: 68 BPM | DIASTOLIC BLOOD PRESSURE: 81 MMHG

## 2025-03-17 DIAGNOSIS — G44.329 CHRONIC POST-TRAUMATIC HEADACHE, NOT INTRACTABLE: Primary | ICD-10-CM

## 2025-03-17 DIAGNOSIS — G47.33 OSA (OBSTRUCTIVE SLEEP APNEA): ICD-10-CM

## 2025-03-17 DIAGNOSIS — E83.52 HYPERCALCEMIA: ICD-10-CM

## 2025-03-17 DIAGNOSIS — G43.009 MIGRAINE WITHOUT AURA AND WITHOUT STATUS MIGRAINOSUS, NOT INTRACTABLE: ICD-10-CM

## 2025-03-17 PROCEDURE — G2211 COMPLEX E/M VISIT ADD ON: HCPCS | Performed by: PSYCHIATRY & NEUROLOGY

## 2025-03-17 PROCEDURE — 99215 OFFICE O/P EST HI 40 MIN: CPT | Performed by: PSYCHIATRY & NEUROLOGY

## 2025-03-17 NOTE — PATIENT INSTRUCTIONS
Breathing 5 seconds then, hold for 5 seconds and 5 seconds out at least once a day not twice daily ideally     Labs ordered by me  ENT - to discuss inspire  PCP for overall care   Eye exam follow up   Reach out to Frank  Set one goal for the day and finish it, short term goals to build to bigger goal of getting fiances in order    As we discussed I ordered another lab check of your electrolytes which ideally could be obtained in about 3 to 4 weeks after increasing the dose and it also includes calcium.  -That is called a CMP and I ordered it after our visit 12/16/2024 but please obtain now after visit 3/17/2025 and I added ionized calcium, vitamin D, PTH to further evaluate hypercalcemia which means high calcium    Let me know if you ever want me to send you back to the Neurosurgeon for possible NPH evaluation    Next time you see your PCP mention that your calcium was just ever so slightly elevated and they will follow-up with your next lab draw (sounds like they ordered follow up labs since the calcium came back in 8/2024)  -I am going to order these labs 3/17/2025 just so that they get done to make sure it is nothing treatable    -As we now have discussed at multiple visits please obtain the labs so that I can monitor your electrolytes  - I am glad that you stopped hydrochlorothiazide after we discussed with Dr. Argueta just because as we discussed acetazolamide and hydrochlorothiazide both can lower your potassium and act similarly  -Please also discuss whether a follow-up of ferritin/iron would be indicated I am not sure how to get this authorized by Medicare since I do not order it frequently    Please do follow-up with eye doctor for your regularly scheduled eye exam and please try and obtain the note for my review or have it faxed to us if possible.    Please follow up with sleep medicine regarding the sleep apnea and the CPAP machine with Dr. Ortiz as I think you have not seen him since 11/2022  -As  "we discussed in the past I placed a referral to the ENT/otolaryngology/ear nose and throat doctors and specifically know that Dr. Shafer with [facility] he is one of the doctors in the region who does the inspire device if this is something you are interested in as I would not give up on treating her sleep apnea - [phone]  - At some point, no pressure, consider following up for hearing test because if you do need hearing aids not wearing them could increase risk of dementia    -Continue to follow with primary care provider regarding secondary stroke prevention - high blood pressure, high cholesterol     Fall precautions     - \"Rewire Your Anxious Brain: How to Use the Neuroscience of Fear to End Anxiety, Panic, and Worry\" By Abilio PhD  - I recommend continued counselor for post traumatic stress     Headache/migraine treatment:   Abortive medications (for immediate treatment of a headache):   It is ok to take ibuprofen, acetaminophen or naproxen (Advil, Tylenol,  Aleve, Excedrin) if they help your headaches you should limit these to No more than 3 times a week to avoid medication overuse/rebound headaches.       Over the counter preventive supplements for headaches/migraines (if you try, try for 3 months straight)  (to take every day to help prevent headaches - not to take at the time of headache):  There are combo pills online of these - none of which regulated by FDA and double check dosing - take appropriate dose only once a day- preventa migraine, migravent, mind ease, migrelief   [x] Magnesium 400mg daily (If any diarrhea or upset stomach, decrease dose  as tolerated)        Prescription preventive medications for headaches/migraines   (to take every day to help prevent headaches - not to take at the time of headache):  [x]  I suggest trial of lower dose diamox than we typically use in more severe cases -  -     acetaZOLAMIDE (DIAMOX)  7 AM, 3 PM and 10/11 PM -      *  As we discussed I suspect the " long-acting capsules last or have at least peak effect more around 8-hour timeframe rather than the 12-hour timeframe.       - if a lower dose is helpful, can stay lower, if higher dose causes side effects, go back to last tolerated dose.  If you get all the way up to the dose recommended and is not helping enough let me know as I will absolutely go higher.    - make sure to stay hydrated while on this as can cause dehydration since that is it's purpose to take fluid off.   - most common side effect is tingling of the nerves at times  - can cause electrolyte disturbances, but not typically in any significant way. However, be cautious if taking with other meds that lower potassium like hydrochlorothiazide etc    *Typically these types of medications take time untill you see the benefit, although some may see improvement in days, often it may take weeks, especially if the medication is being titrated up to a beneficial level. Please contact us if there are any concerns or questions regarding the medication.     Lifestyle Recommendations:  [x] SLEEP - Maintain a regular sleep schedule: Adults need at least 7-8 hours of uninterrupted a night. Maintain good sleep hygiene:  Going to bed and waking up at consistent times, avoiding excessive daytime naps, avoiding caffeinated beverages in the evening, avoid excessive stimulation in the evening and generally using bed primarily for sleeping.  One hour before bedtime would recommend turning lights down lower, decreasing your activity (may read quietly, listen to music at a low volume). When you get into bed, should eliminate all technology (no texting, emailing, playing with your phone, iPad or tablet in bed).  [x] HYDRATION - Maintain good hydration.  Drink  2L of fluid a day (4 typical small water bottles)  [x] DIET - Maintain good nutrition. In particular don't skip meals and try and eat healthy balanced meals regularly.  [x] EXERCISE - physical exercise as we all know is  good for you in many ways, and not only is good for your heart, but also is beneficial for your mental health, cognitive health and  chronic pain/headaches. I would encourage at the least 5 days of physical exercise weekly for at least 30 minutes.     Education and Follow-up  [x] Please call with any questions or concerns. Of course if any new concerning symptoms go to the emergency department.  [x] Follow up 3 months, sooner if needed

## 2025-03-17 NOTE — PROGRESS NOTES
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 fluid a day (4 typical small water bottles)  [x] DIET - Maintain good nutrition. In particular don't skip meals and try and eat healthy balanced meals regularly.  [x] EXERCISE - physical exercise as we all know is good for you in many ways, and not only is good for your heart, but also is beneficial for your mental health, cognitive health and  chronic pain/headaches. I would encourage at the least 5 days of physical exercise weekly for at least 30 minutes.     Education and Follow-up  [x] Please call with any questions or concerns. Of course if any new concerning symptoms go to the emergency department.  [x] Follow up 3 months, sooner if needed       Migraine without aura and without status migrainosus, not intractable         KASI (obstructive sleep apnea)         Hypercalcemia    Orders:    PTH, intact; Future    Calcium, ionized; Future    Vitamin D 25 hydroxy; Future      CC:   We had the pleasure of evaluating Yaya Rodriguez in neurological consultation today. Yaya Rdoriguez presents today for evaluation of headaches.   History obtained from patient as well as available medical record review.  History of Present Illness:   Interval history as of 3/17/25  - Of note/managed by others:   - Has not followed up with PCP yet for evaluation of hypercalcemia contributing  "to symptoms   - has not followed up with sleep medicine or ENT for severe KASI with hypoxia  -Reports he does not mind the drive over here, but if I have for retired he would stop seeing neurology  - no significant new or concerning neurologic symptoms since last visit reported  - diagnostics of note (please see EMR for others/details):   -8/28/2024 CMP significant for glucose 101 chloride 110 calcium 10.2  Unfortunately did not obtain CMP ordered 12/16/2024 to be obtained 2 to 3 weeks after   - last eye exam: over a year ago, over due and encouraged to go  - sleep: - Severe KASI not on CPAP (PSG 5/27/22, AHI 44.8, AHI supine 74.5, AHI REM 51.5, oxygen drops to 70%, amount of sleep time less than or equal to 89% was 37.0 minutes)-following with sleep medicine - referral to ENT for inspire as well in place  Forgot to call ENT     Headaches and migraines   He went from waxing waning headaches on average 3 times a week and in the 2 months prior to last visit 4-5 times a week and ibuprofen typically helps them and we increased acetazolamide from 500 mg BID to TID and he is now not having any headaches at all in the past week. Would only notice a little of something headache wise when due for next acetazolamide dose     Dizziness unchanged for the most part - maybe a little better    Mood - \"I seem to be aggravated easily\"  - I can do the things I want to do but wont do the things he doesn't want to do - like bills  - a little happier   - drives to get brunch/breakfast usually  - sit down restaurant   - has a former partner from work that calls him once a week or two   - only out once on the bike   - driving no issues   - occasionally goes down to the race [place]  - [place] significant other of 30 years ago   - used to walk with her as well     Preventative:   -     acetaZOLAMIDE (DIAMOX) - 7 AM, 3 PM and 10/11 PM - sometimes after 7 goes back to bed   No reported bothersome side effects    Abortive: " "  Iburprofen helps if needed    No reported bothersome side effects     Please see previous notes/EMR for details from previous visits.     Objective     Physical Exam:                                                                 Vitals:            Constitutional:    /81 (BP Location: Left arm, Patient Position: Sitting, Cuff Size: Standard)   Pulse 68   Ht 5' 11\" (1.803 m)   Wt 88.9 kg (196 lb)   BMI 27.34 kg/m²   BP Readings from Last 3 Encounters:   03/17/25 150/81   12/16/24 154/87   08/27/24 151/66     Pulse Readings from Last 3 Encounters:   03/17/25 68   12/16/24 (!) 54   08/27/24 73         Well developed, well nourished, well groomed.        Psychiatric:  Normal behavior and appropriate affect        Able to answer questions appropriately, provide history of recent events   Normal language and spontaneous speech.  facial expression symmetric  symmetric bulk throughout. no atrophy, fasciculations or significant abnormal movements noted during our visit from observation.   steady casual gait       ROS:  ROS obtained by medical assistant and reviewed, but if any symptoms listed below say negative, does not mean patient has not had this symptom since last visit, please see HPI for details of symptoms discussed this visit.  Regarding any abnormal or positive findings in ROS that are not neurologic related, patient instructed to address these issues with PCP or go to the ER if they are severe.    Review of Systems   Constitutional:  Negative for appetite change, fatigue and fever.   HENT: Negative.  Negative for hearing loss, tinnitus, trouble swallowing and voice change.    Eyes: Negative.  Negative for photophobia, pain and visual disturbance.   Respiratory: Negative.  Negative for shortness of breath.    Cardiovascular: Negative.  Negative for palpitations.   Gastrointestinal: Negative.  Negative for nausea and vomiting.   Endocrine: Negative.  Negative for cold intolerance.   Genitourinary: " Negative.  Negative for dysuria, frequency and urgency.   Musculoskeletal:  Negative for back pain, gait problem, myalgias, neck pain and neck stiffness.   Skin: Negative.  Negative for rash.   Allergic/Immunologic: Negative.    Neurological:  Positive for headaches (none). Negative for tremors, seizures, syncope, facial asymmetry, speech difficulty, weakness, light-headedness and numbness.   Hematological: Negative.  Does not bruise/bleed easily.   Psychiatric/Behavioral: Negative.  Negative for confusion, hallucinations and sleep disturbance.        Administrative Statements   I have spent a total time of 48 minutes in caring for this patient on the day of the visit/encounter including Diagnostic results, Prognosis, Risks and benefits of tx options, Instructions for management, Patient education, Importance of tx compliance, Risk factor reductions, Impressions, Counseling / Coordination of care, Documenting in the medical record, Obtaining or reviewing history  , and Communicating with other healthcare professionals .

## 2025-03-17 NOTE — PROGRESS NOTES
Review of Systems   Constitutional:  Negative for appetite change, fatigue and fever.   HENT: Negative.  Negative for hearing loss, tinnitus, trouble swallowing and voice change.    Eyes: Negative.  Negative for photophobia, pain and visual disturbance.   Respiratory: Negative.  Negative for shortness of breath.    Cardiovascular: Negative.  Negative for palpitations.   Gastrointestinal: Negative.  Negative for nausea and vomiting.   Endocrine: Negative.  Negative for cold intolerance.   Genitourinary: Negative.  Negative for dysuria, frequency and urgency.   Musculoskeletal:  Negative for back pain, gait problem, myalgias, neck pain and neck stiffness.   Skin: Negative.  Negative for rash.   Allergic/Immunologic: Negative.    Neurological:  Positive for dizziness (constant) and headaches (none). Negative for tremors, seizures, syncope, facial asymmetry, speech difficulty, weakness, light-headedness and numbness.   Hematological: Negative.  Does not bruise/bleed easily.   Psychiatric/Behavioral: Negative.  Negative for confusion, hallucinations and sleep disturbance.

## 2025-06-23 ENCOUNTER — OFFICE VISIT (OUTPATIENT)
Dept: NEUROLOGY | Facility: CLINIC | Age: 70
End: 2025-06-23
Payer: MEDICARE

## 2025-06-23 ENCOUNTER — TELEPHONE (OUTPATIENT)
Dept: NEUROLOGY | Facility: CLINIC | Age: 70
End: 2025-06-23

## 2025-06-23 VITALS
HEART RATE: 76 BPM | SYSTOLIC BLOOD PRESSURE: 110 MMHG | BODY MASS INDEX: 26.6 KG/M2 | DIASTOLIC BLOOD PRESSURE: 76 MMHG | OXYGEN SATURATION: 97 % | TEMPERATURE: 98.7 F | WEIGHT: 190 LBS | HEIGHT: 71 IN

## 2025-06-23 DIAGNOSIS — G44.329 CHRONIC POST-TRAUMATIC HEADACHE, NOT INTRACTABLE: Primary | ICD-10-CM

## 2025-06-23 DIAGNOSIS — G47.33 OSA (OBSTRUCTIVE SLEEP APNEA): ICD-10-CM

## 2025-06-23 DIAGNOSIS — Z87.820 H/O CONCUSSION: ICD-10-CM

## 2025-06-23 LAB — 25(OH)D3+25(OH)D2 SERPL-MCNC: 32 NG/ML (ref 30–100)

## 2025-06-23 PROCEDURE — 99215 OFFICE O/P EST HI 40 MIN: CPT | Performed by: PSYCHIATRY & NEUROLOGY

## 2025-06-23 PROCEDURE — G2211 COMPLEX E/M VISIT ADD ON: HCPCS | Performed by: PSYCHIATRY & NEUROLOGY

## 2025-06-23 NOTE — TELEPHONE ENCOUNTER
----- Message from Tracy Moore MD sent at 6/23/2025 12:22 PM EDT -----  Could you please help get his eye exam from June 2025, same place he went in 7/2024 [facility] - same place uploaded in system from last year, and could you let me know when we have it  thank you!

## 2025-06-23 NOTE — PROGRESS NOTES
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 medications (for immediate treatment of a headache):   It is ok to take ibuprofen, acetaminophen or naproxen (Advil, Tylenol,  Aleve, Excedrin) if they help your headaches you should limit these to No more than 3 times a week to avoid medication overuse/rebound headaches.       Prescription preventive medications for headaches/migraines   (to take every day to help prevent headaches - not to take at the time of headache):  [x]  I suggest trial of lower dose diamox than we typically use in more severe cases -  -     acetaZOLAMIDE (DIAMOX)  7 AM, 3 PM and 10/11 PM -      *  As we discussed I suspect the long-acting capsules last or have at least peak effect more around 8-hour timeframe rather than the 12-hour timeframe.       - if a lower dose is helpful, can stay lower, if higher dose causes side effects, go back to last tolerated dose.  If you get all the way up to the dose recommended and is not helping enough let me know as I will absolutely go higher.    - make sure to stay hydrated while on this as can cause dehydration since that is it's purpose to take fluid off.   - most common side effect is tingling of the nerves at times  - can cause electrolyte disturbances, but not typically in any significant " way. However, be cautious if taking with other meds that lower potassium like hydrochlorothiazide etc    *Typically these types of medications take time untill you see the benefit, although some may see improvement in days, often it may take weeks, especially if the medication is being titrated up to a beneficial level. Please contact us if there are any concerns or questions regarding the medication.     Lifestyle Recommendations:  [x] SLEEP - Maintain a regular sleep schedule: Adults need at least 7-8 hours of uninterrupted a night. Maintain good sleep hygiene:  Going to bed and waking up at consistent times, avoiding excessive daytime naps, avoiding caffeinated beverages in the evening, avoid excessive stimulation in the evening and generally using bed primarily for sleeping.  One hour before bedtime would recommend turning lights down lower, decreasing your activity (may read quietly, listen to music at a low volume). When you get into bed, should eliminate all technology (no texting, emailing, playing with your phone, iPad or tablet in bed).  [x] HYDRATION - Maintain good hydration.  Drink  2L of fluid a day (4 typical small water bottles)  [x] DIET - Maintain good nutrition. In particular don't skip meals and try and eat healthy balanced meals regularly.  [x] EXERCISE - physical exercise as we all know is good for you in many ways, and not only is good for your heart, but also is beneficial for your mental health, cognitive health and  chronic pain/headaches. I would encourage at the least 5 days of physical exercise weekly for at least 30 minutes.     Education and Follow-up  [x] Please call with any questions or concerns. Of course if any new concerning symptoms go to the emergency department.  [x] Follow up 4 months, sooner if needed       KASI (obstructive sleep apnea)    Orders:    Ambulatory Referral to Otolaryngology; Future    H/O concussion             CC:   We had the pleasure of evaluating Yaya CAMILO  "Jennifer in neurological consultation today. Yaya Rodriguez presents today for evaluation of headaches.   History obtained from patient as well as available medical record review.  History of Present Illness:   Interval history as of 6/23/2025  - Of note/managed by others:   Depression Screening Follow-up Plan: Patient's depression screening was positive with a PHQ-9 score of 9. Patient advised to follow-up with PCP for further management. Has apmt coming up in two days   - losing weight, trying to and has not been this low in years   - no significant new or concerning neurologic symptoms since last visit reported  - diagnostics of note (please see EMR for others/details):   - He is still obtained labs ordered in the past including CMP ordered 12/16/2024 for monitoring on current medications, vitamin D, calcium ionized and PTH ordered 3/17/2025  - last eye exam: mid June 2025, explained something wrong with left eye and prescription - eye sight actually better with the prescription   *Addendum 6/23/2025 after visit we were able to obtain the report which was uploaded -  they documented \"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\", bilateral cataracts consider cataract surgery, consider tints  HPI using eyedrops 1-2 times a day for dry eye  - sleep: Severe KASI not on CPAP (PSG 5/27/22, AHI 44.8, AHI supine 74.5, AHI REM 51.5, oxygen drops to 70%, amount of sleep time less than or equal to 89% was 37.0 minutes)-following with sleep medicine - referral to ENT for inspire as well in place  ENT - didn't go     Headaches and migraines   No bothersome headaches, mild pain every 2-3 days   Feels like - \"I feel like I am back to my old self\"  Everyone around me always says how I am doing better- his ex, she says recently after a function \"everyone commented you're doing a lot better\" 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   Some days gets up at 8 AM, some days 10 AM, some days didn't get out of bed     Last visit recommended at minimum -   Labs ordered by me - DID NOT OBTAIN  ENT - to discuss inspire DID NOT GO  PCP for overall care - appointment coming up 6/25/25  Eye exam follow up DID GO   Reach out to Alonzo (old coworker) - glad he calls you every two weeks, but reach out to him too DID NOT CALL HIM  Consider Mountain Biking more again if you feel safe to do so  Set one goal for the day and finish it, short term goals to build to bigger goal of getting fiances in order   - if does one thing feels proud of himself    Preventative:    - acetaZOLAMIDE (DIAMOX) 500 mg capsule; 1 tab p.o. at 7 AM, 3 PM and 10/11 PM   No reported bothersome side effects      - Currently on through other providers: thinks he stopped magnesium 400 mg, may have stopped melatonin, coenzyme Q10, B12, losartan 50 mg (held while on acetazolamide/Diamox, PCP in agreement),  ferrous sulfate 325 mg nightly,  B12 1000 once a day, omega 3, co-enzyme Q-10 100 mg     Abortive:   typically self resolve or ibuprofen helps       Please see previous notes/EMR for details from previous visits.     Objective     Physical Exam:                                                                 Vitals:            Constitutional:    /76 (BP Location: Left arm, Patient Position: Sitting, Cuff Size: Large)   Pulse 76   Temp 98.7 °F (37.1 °C) (Temporal)   Ht 5' 11\" (1.803 m)  "  Wt 86.2 kg (190 lb)   SpO2 97%   BMI 26.50 kg/m²   BP Readings from Last 3 Encounters:   06/23/25 110/76   03/17/25 150/81   12/16/24 154/87     Pulse Readings from Last 3 Encounters:   06/23/25 76   03/17/25 68   12/16/24 (!) 54         Well developed, well nourished, well groomed.        Psychiatric:  Normal behavior and appropriate affect, less flat than in the past, laughs at times     Able to answer questions appropriately, provide history of recent events for the most part,   Normal language and spontaneous speech.  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 like S and C at times  Facial expression symmetric  steady casual gait, only briefly pauses when he stands up compared to previously had to stand for a long time before he could walk          Administrative Statements   I have spent a total time of 62 minutes in caring for this patient on the day of the visit/encounter including Prognosis, Risks and benefits of tx options, Instructions for management, Patient education, Importance of tx compliance, Risk factor reductions, Impressions, Counseling / Coordination of care, Documenting in the medical record, Reviewing/placing orders in the medical record (including tests, medications, and/or procedures), Obtaining or reviewing history  , and Communicating with other healthcare professionals .

## 2025-06-23 NOTE — TELEPHONE ENCOUNTER
Called [facility] and spoke to Sherry who will have one of the team member fax over eye report.

## 2025-06-23 NOTE — PATIENT INSTRUCTIONS
Breathing 5 seconds then, hold for 5 seconds and 5 seconds out at least once a day not twice daily ideally     Labs ordered by me - PLEASE OBTAIN TODAY  ENT - to discuss inspire - RE-ENTERED 25 as it   PCP for overall care - appointment coming up 25  Reach out to Alonzo (old coworker) - glad he calls you every two weeks, but reach out to him too DID NOT CALL HIM  Consider Biking more again if you feel safe to do so balance wise  Set one goal for the day and finish it, short term goals to build to bigger goal of getting fiances in order     As we discussed I ordered another lab check of your electrolytes which ideally could be obtained in about 3 to 4 weeks after increasing the dose and it also includes calcium.  -That is called a CMP and I ordered it after our visit 2024 but please obtain now after visit 3/17/2025 and I added ionized calcium, vitamin D, PTH to further evaluate hypercalcemia which means high calcium    Let me know if you ever want me to send you back to the Neurosurgeon for possible NPH evaluation    Next time you see your PCP mention that your calcium was just ever so slightly elevated and they will follow-up with your next lab draw (sounds like they ordered follow up labs since the calcium came back in 2024)  -I am going to order these labs 3/17/2025 just so that they get done to make sure it is nothing treatable    -As we now have discussed at multiple visits please obtain the labs so that I can monitor your electrolytes  - I am glad that you stopped hydrochlorothiazide after we discussed with Dr. Argueta just because as we discussed acetazolamide and hydrochlorothiazide both can lower your potassium and act similarly  -Please also discuss whether a follow-up of ferritin/iron would be indicated I am not sure how to get this authorized by Medicare since I do not order it frequently    Please do follow-up with eye doctor for your regularly scheduled eye exam and please try and  "obtain the note for my review or have it faxed to us if possible.    Please follow up with sleep medicine regarding the sleep apnea and the CPAP machine with Dr. Ortiz as I think you have not seen him since 11/2022  -As we discussed in the past I placed a referral to the ENT/otolaryngology/ear nose and throat doctors and specifically know that Dr. Shafer with [facility] he is one of the doctors in the region who does the inspire device if this is something you are interested in as I would not give up on treating her sleep apnea - [phone]  - At some point, no pressure, consider following up for hearing test because if you do need hearing aids not wearing them could increase risk of dementia    -Continue to follow with primary care provider regarding secondary stroke prevention - high blood pressure, high cholesterol     Fall precautions     - \"Rewire Your Anxious Brain: How to Use the Neuroscience of Fear to End Anxiety, Panic, and Worry\" By Abilio PhD  - I recommend continued counselor for post traumatic stress     Headache/migraine treatment:   Abortive medications (for immediate treatment of a headache):   It is ok to take ibuprofen, acetaminophen or naproxen (Advil, Tylenol,  Aleve, Excedrin) if they help your headaches you should limit these to No more than 3 times a week to avoid medication overuse/rebound headaches.       Over the counter preventive supplements for headaches/migraines (if you try, try for 3 months straight)  (to take every day to help prevent headaches - not to take at the time of headache):  There are combo pills online of these - none of which regulated by FDA and double check dosing - take appropriate dose only once a day- preventa migraine, migravent, mind ease, migrelief   [x] Magnesium 400mg daily (If any diarrhea or upset stomach, decrease dose  as tolerated)        Prescription preventive medications for headaches/migraines   (to take every day to help prevent headaches - " not to take at the time of headache):  [x]  I suggest trial of lower dose diamox than we typically use in more severe cases -  -     acetaZOLAMIDE (DIAMOX)  7 AM, 3 PM and 10/11 PM -      *  As we discussed I suspect the long-acting capsules last or have at least peak effect more around 8-hour timeframe rather than the 12-hour timeframe.       - if a lower dose is helpful, can stay lower, if higher dose causes side effects, go back to last tolerated dose.  If you get all the way up to the dose recommended and is not helping enough let me know as I will absolutely go higher.    - make sure to stay hydrated while on this as can cause dehydration since that is it's purpose to take fluid off.   - most common side effect is tingling of the nerves at times  - can cause electrolyte disturbances, but not typically in any significant way. However, be cautious if taking with other meds that lower potassium like hydrochlorothiazide etc    *Typically these types of medications take time untill you see the benefit, although some may see improvement in days, often it may take weeks, especially if the medication is being titrated up to a beneficial level. Please contact us if there are any concerns or questions regarding the medication.     Lifestyle Recommendations:  [x] SLEEP - Maintain a regular sleep schedule: Adults need at least 7-8 hours of uninterrupted a night. Maintain good sleep hygiene:  Going to bed and waking up at consistent times, avoiding excessive daytime naps, avoiding caffeinated beverages in the evening, avoid excessive stimulation in the evening and generally using bed primarily for sleeping.  One hour before bedtime would recommend turning lights down lower, decreasing your activity (may read quietly, listen to music at a low volume). When you get into bed, should eliminate all technology (no texting, emailing, playing with your phone, iPad or tablet in bed).  [x] HYDRATION - Maintain good hydration.  Drink   2L of fluid a day (4 typical small water bottles)  [x] DIET - Maintain good nutrition. In particular don't skip meals and try and eat healthy balanced meals regularly.  [x] EXERCISE - physical exercise as we all know is good for you in many ways, and not only is good for your heart, but also is beneficial for your mental health, cognitive health and  chronic pain/headaches. I would encourage at the least 5 days of physical exercise weekly for at least 30 minutes.     Education and Follow-up  [x] Please call with any questions or concerns. Of course if any new concerning symptoms go to the emergency department.  [x] Follow up 4 months, sooner if needed